# Patient Record
Sex: FEMALE | Race: WHITE | Employment: OTHER | URBAN - NONMETROPOLITAN AREA
[De-identification: names, ages, dates, MRNs, and addresses within clinical notes are randomized per-mention and may not be internally consistent; named-entity substitution may affect disease eponyms.]

---

## 2017-12-29 ENCOUNTER — HOSPITAL ENCOUNTER (OUTPATIENT)
Dept: WOMENS IMAGING | Age: 62
Discharge: HOME | End: 2017-12-29
Payer: COMMERCIAL

## 2017-12-29 DIAGNOSIS — Z12.31 VISIT FOR SCREENING MAMMOGRAM: ICD-10-CM

## 2017-12-29 PROCEDURE — 77063 BREAST TOMOSYNTHESIS BI: CPT

## 2018-12-31 ENCOUNTER — HOSPITAL ENCOUNTER (OUTPATIENT)
Dept: WOMENS IMAGING | Age: 63
Discharge: HOME | End: 2018-12-31
Payer: COMMERCIAL

## 2018-12-31 DIAGNOSIS — Z78.0 POSTMENOPAUSAL: ICD-10-CM

## 2018-12-31 DIAGNOSIS — Z12.31 VISIT FOR SCREENING MAMMOGRAM: ICD-10-CM

## 2018-12-31 PROCEDURE — 77063 BREAST TOMOSYNTHESIS BI: CPT

## 2018-12-31 PROCEDURE — 77080 DXA BONE DENSITY AXIAL: CPT

## 2019-10-10 ENCOUNTER — NURSE ONLY (OUTPATIENT)
Dept: LAB | Age: 64
End: 2019-10-10

## 2019-12-03 ENCOUNTER — NURSE ONLY (OUTPATIENT)
Dept: LAB | Age: 64
End: 2019-12-03

## 2019-12-06 LAB — CYTOLOGY THIN PREP PAP: NORMAL

## 2020-01-02 ENCOUNTER — HOSPITAL ENCOUNTER (OUTPATIENT)
Dept: WOMENS IMAGING | Age: 65
Discharge: HOME | End: 2020-01-02
Payer: COMMERCIAL

## 2020-01-02 PROCEDURE — 77063 BREAST TOMOSYNTHESIS BI: CPT

## 2021-01-04 ENCOUNTER — HOSPITAL ENCOUNTER (OUTPATIENT)
Dept: MAMMOGRAPHY | Age: 66
Discharge: HOME | End: 2021-01-04
Payer: COMMERCIAL

## 2021-01-04 DIAGNOSIS — Z12.31 VISIT FOR SCREENING MAMMOGRAM: ICD-10-CM

## 2021-01-04 PROCEDURE — 77063 BREAST TOMOSYNTHESIS BI: CPT

## 2021-01-07 ENCOUNTER — HOSPITAL ENCOUNTER (OUTPATIENT)
Dept: WOMENS IMAGING | Age: 66
Discharge: HOME | End: 2021-01-07
Payer: COMMERCIAL

## 2021-01-07 DIAGNOSIS — R92.1 BREAST CALCIFICATION, RIGHT: ICD-10-CM

## 2021-01-07 PROCEDURE — G0279 TOMOSYNTHESIS, MAMMO: HCPCS

## 2021-01-22 ENCOUNTER — HOSPITAL ENCOUNTER (OUTPATIENT)
Dept: WOMENS IMAGING | Age: 66
Discharge: HOME | End: 2021-01-22
Payer: COMMERCIAL

## 2021-01-22 DIAGNOSIS — R92.1 BREAST CALCIFICATIONS: ICD-10-CM

## 2021-01-22 PROCEDURE — 88360 TUMOR IMMUNOHISTOCHEM/MANUAL: CPT

## 2021-01-22 PROCEDURE — 2709999900 MAM STEREO BREAST BX W LOC DEVICE 1ST LESION RIGHT

## 2021-01-22 PROCEDURE — 77065 DX MAMMO INCL CAD UNI: CPT

## 2021-01-22 PROCEDURE — 88305 TISSUE EXAM BY PATHOLOGIST: CPT

## 2021-01-22 NOTE — PROGRESS NOTES
Breast Biopsy Flowsheet/Post-Operative Care    Date of Procedure: 1/22/2021  Physician: Dr. Uriel Martinez  Technologist: Patricia Diaz RT(R)(M)    Biopsy:stereotactic breast biopsy  Lesion type: Non-palpable  Breast: right    Clock face position: Site #1: UOQ mid          Primary Method of Detection: Mammogram      Microcalcification's: yes,Increasing Number   Distribution: Grouped    Asymmetry: asymmetric    Biopsy Method:   Mammotome:    Site # 1    Gauge: 10    # of Passes: 8     Clip: Guille       Pre-Op Assessment: (BI-RADS)   4. Suspicious Abnormality    Patient Tolerated Procedure: good  Complications: none  Comments: none    Post Operative Care  Steri strips: Yes  Dressing: Gauze, Tape   Ice Applied to Site:  Yes  Evidence of Bleeding:  No    Pain Verbalized: No      Written Discharge Instructions: Yes  Condition at Discharge: good  Time of Discharge: 1400    Electronically signed by Jose M Wolfe on 1/22/2021 at 1:52 PM

## 2021-01-22 NOTE — PROGRESS NOTES
[facility] [address]  Pre-Biopsy Assessment      Patient Education    Written information about procedure Yes  right   Procedural steps explained Yes Stereotactic Biopsy   Post-op potential: bruising, hematoma, pain Yes    Self-care: activity, care of dressing Yes    Patient verbalized understanding Yes    Consent signed and witnessed Yes      Hormone Therapy Status: takes Estroven (OTC) 6 years    Recent Medication: Aspirin Last Dose: Monday Jan 18th                                     Hormone Replacement Therapy: yes - takes Estroven (OTC) 6 years    Previous Breast Biopsy: no    Previous Diagnosis Cancer: no    Hysterectomy:no    Emotional Status: Calm    Language or Physical Barriers: none    Comments: none      Electronically signed by Marciana Dakin on 1/22/2021 at 1:14 PM

## 2021-01-25 ENCOUNTER — CLINICAL DOCUMENTATION (OUTPATIENT)
Dept: WOMENS IMAGING | Age: 66
End: 2021-01-25

## 2021-01-25 NOTE — PROGRESS NOTES
Contact [facility]: [facility]  Diagnosis: Breast-malignant  Home Disposition: Lives with   Contact Information: Arrived with daughter, Cierra Dunlap for biopsy results. Dr. Lou Miller discussed pathology and recommended breast clinic. All cards given for surgeons and oncologists. Wants to see Dr(s): Undecided. Requests Referral to Doctor(s) with appointment(s): N/A  Additional Referral(s): Sree Nguyen/Sophy Page: [facility]   Integrated breast cancer clinic appointment: 1-29-21 6676  Biopsy site status: Good, some soreness in nipple area. Teaching Sheets provided: Cancer Type: DCIS, What is Breast Cancer, Tumor Markers, Needle Localization, Lumpectomy/Mastectomy Comparison, Radiation Therapy, Breast Cancer Navigation Packet, Nurse Navigation contact information, Breast Clinic appointment and map. Currently on HRT: OTC Estroven   If yes, was patient instructed to stop immediately: YES    Notes: Explained terms doctor and navigators will discuss at next appointments. Questions addressed and encouraged patient to ask [facility]. Will receive call within 24 hrs. Referral faxed to Navigation. Answered yes on Genetic Risk Assessment: No   Results Faxed to:  FELIPA [address] [facility] and ALEXANDRA Loomis.

## 2021-01-26 ENCOUNTER — TELEPHONE (OUTPATIENT)
Dept: CASE MANAGEMENT | Age: 66
End: 2021-01-26

## 2021-01-26 NOTE — TELEPHONE ENCOUNTER
Name: Dione Geiger  : 1955  MRN: [id number]    Oncology Navigation Follow-Up Note    Contact Type:  Telephone    Notes: Called patient to schedule teaching for newly diagnosed breast cancer.   Appointment scheduled for 21 at 1000    Electronically signed by Chris Pineda RN on 2021 at 9:48 AM

## 2021-01-27 ENCOUNTER — CLINICAL DOCUMENTATION (OUTPATIENT)
Dept: CASE MANAGEMENT | Age: 66
End: 2021-01-27

## 2021-01-27 NOTE — PROGRESS NOTES
Name: Katherine Farrar  : 1955  MRN: [id number]      Oncology Navigation- Initial Note: Teaching    Intake-  Contact Type: Cancer Center-teaching with navigator    Diagnosis: Breast-malignant    Home Disposition: Lives with other who is able to assist,  Gene. Patient needs and barriers to care: Knowledge deficit, Emotional Issues/ Fear/ Anxiety, Financial Concerns/ Disability and Symptom Management     Referral Source: Outside Provider    Receptive to Advanced Care Planning/ Palliative Care:  NA    Interventions-   General Interventions: Arrived with  for initial breast cancer teaching. Patient diagnosed  21 with DCIS right breast.      Genetics/Family Hx: MGM with colon cancer diagnosed at age 66. Education/Screenings:  yes -  Breast cancer information packet and navigation welcome packet reviewed. Printed material provided and reviewed. Discussed DCIS, lumpectomy vs mastectomy, ER and IA status, needle localization, and post-op exercises to start when OK by surgeon. Post op pillow provided. Currently on HRT: [address] instructed pt to stop. Referrals: none at this time     Continuum of Care: Diagnosis/Active Treatment    Notes: Teaching completed, questions addressed, emotional support provided. Contact information provided and patient encouraged to call with any questions or concerns. Will follow through continuum of care.       Electronically signed by Sherire Gonzalez RN on 2021 at 10:53 AM

## 2021-01-29 ENCOUNTER — CLINICAL DOCUMENTATION (OUTPATIENT)
Dept: CASE MANAGEMENT | Age: 66
End: 2021-01-29

## 2021-01-29 NOTE — PROGRESS NOTES
Name: Linda Doll  : 1955  MRN: [id number]    Oncology Navigation Follow-Up Note     Contact Type: Integrated Breast Clinic     Subjective: Arrived with family (spouse, Gene, and adult daughters Kervin [place] and Aarti Pisano) to meet with Breast Team. Team discussed treatment for new breast cancer diagnosis. Brought journal with prepared questions.     Objective: Right breast DCIS, clinical stage 0     Assistance Needed: Denies needs at this time.      Referrals: Will call back today, needs to think about which surgeon and oncologist more. Education: Breast Clinic recommendations reviewed. Copy given to patient, see Media for Breast Clinic Recommendations. Prehab explained and agrees to consult.     Notes: Emotional support offered. Does not meet criteria for genetic evaluation. Encouraged to notify oncologist in future if family history changes. Will follow through continuum of care.     Electronically signed by Nathaly Diamond RN on 2021 at 8:48 AM

## 2021-01-29 NOTE — PROGRESS NOTES
Name: Linda Doll  : 1955  MRN: [id number]    Oncology Navigation Follow-Up Note    Contact Type:  Telephone    Notes: Patient called, wants to see Dr. Bill Garza, appt for 2021 at 21  am and Dr. Javier Crawford. Verbalizes understanding about surgeon appt information and location; and that [facility] will coordinate oncology appt for 2 weeks post-op and will call her with appt information. Cindy Aviles getting order for Prehab, PT dept will call patient to schedule.     Electronically signed by Nathaly Diamond RN on 2021 at 10:12 AM

## 2021-02-01 ENCOUNTER — HOSPITAL ENCOUNTER (OUTPATIENT)
Age: 66
Discharge: HOME | End: 2021-02-01
Payer: COMMERCIAL

## 2021-02-01 ENCOUNTER — OFFICE VISIT (OUTPATIENT)
Dept: SURGERY | Age: 66
End: 2021-02-01
Payer: COMMERCIAL

## 2021-02-01 VITALS
WEIGHT: 163 LBS | SYSTOLIC BLOOD PRESSURE: 128 MMHG | HEART RATE: 72 BPM | TEMPERATURE: 97.5 F | RESPIRATION RATE: 12 BRPM | HEIGHT: 60 IN | BODY MASS INDEX: 32 KG/M2 | DIASTOLIC BLOOD PRESSURE: 78 MMHG | OXYGEN SATURATION: 98 %

## 2021-02-01 DIAGNOSIS — D05.11 DUCTAL CARCINOMA IN SITU (DCIS) OF RIGHT BREAST: Primary | ICD-10-CM

## 2021-02-01 DIAGNOSIS — E11.9 TYPE 2 DIABETES MELLITUS WITHOUT COMPLICATION, WITHOUT LONG-TERM CURRENT USE OF INSULIN (HCC): ICD-10-CM

## 2021-02-01 DIAGNOSIS — D05.11 DUCTAL CARCINOMA IN SITU (DCIS) OF RIGHT BREAST: ICD-10-CM

## 2021-02-01 DIAGNOSIS — Z01.818 PRE-OP TESTING: ICD-10-CM

## 2021-02-01 LAB
ANION GAP SERPL CALCULATED.3IONS-SCNC: 15 MEQ/L (ref 8–16)
BUN BLDV-MCNC: 17 MG/DL (ref 7–22)
CALCIUM SERPL-MCNC: 10.9 MG/DL (ref 8.5–10.5)
CHLORIDE BLD-SCNC: 100 MEQ/L (ref 98–111)
CO2: 28 MEQ/L (ref 23–33)
CREAT SERPL-MCNC: 0.9 MG/DL (ref 0.4–1.2)
EKG ATRIAL RATE: 71 BPM
EKG P AXIS: 55 DEGREES
EKG P-R INTERVAL: 140 MS
EKG Q-T INTERVAL: 376 MS
EKG QRS DURATION: 78 MS
EKG QTC CALCULATION (BAZETT): 408 MS
EKG R AXIS: 77 DEGREES
EKG T AXIS: 37 DEGREES
EKG VENTRICULAR RATE: 71 BPM
GFR SERPL CREATININE-BSD FRML MDRD: 63 ML/MIN/1.73M2
GLUCOSE BLD-MCNC: 92 MG/DL (ref 70–108)
HCT VFR BLD CALC: 45.7 % (ref 37–47)
HEMOGLOBIN: 14.5 GM/DL (ref 12–16)
POTASSIUM SERPL-SCNC: 4 MEQ/L (ref 3.5–5.2)
SODIUM BLD-SCNC: 143 MEQ/L (ref 135–145)

## 2021-02-01 PROCEDURE — 99204 OFFICE O/P NEW MOD 45 MIN: CPT | Performed by: SURGERY

## 2021-02-01 PROCEDURE — 36415 COLL VENOUS BLD VENIPUNCTURE: CPT

## 2021-02-01 PROCEDURE — 93005 ELECTROCARDIOGRAM TRACING: CPT

## 2021-02-01 PROCEDURE — 93010 ELECTROCARDIOGRAM REPORT: CPT | Performed by: NUCLEAR MEDICINE

## 2021-02-01 PROCEDURE — 85018 HEMOGLOBIN: CPT

## 2021-02-01 PROCEDURE — 80048 BASIC METABOLIC PNL TOTAL CA: CPT

## 2021-02-01 PROCEDURE — 85014 HEMATOCRIT: CPT

## 2021-02-01 SDOH — ECONOMIC STABILITY: INCOME INSECURITY: HOW HARD IS IT FOR YOU TO PAY FOR THE VERY BASICS LIKE FOOD, HOUSING, MEDICAL CARE, AND HEATING?: NOT ASKED

## 2021-02-01 SDOH — ECONOMIC STABILITY: FOOD INSECURITY: WITHIN THE PAST 12 MONTHS, YOU WORRIED THAT YOUR FOOD WOULD RUN OUT BEFORE YOU GOT MONEY TO BUY MORE.: NOT ASKED

## 2021-02-01 SDOH — ECONOMIC STABILITY: FOOD INSECURITY: WITHIN THE PAST 12 MONTHS, THE FOOD YOU BOUGHT JUST DIDN'T LAST AND YOU DIDN'T HAVE MONEY TO GET MORE.: NOT ASKED

## 2021-02-01 SDOH — ECONOMIC STABILITY: TRANSPORTATION INSECURITY
IN THE PAST 12 MONTHS, HAS LACK OF TRANSPORTATION KEPT YOU FROM MEETINGS, WORK, OR FROM GETTING THINGS NEEDED FOR DAILY LIVING?: NOT ASKED

## 2021-02-01 SDOH — ECONOMIC STABILITY: TRANSPORTATION INSECURITY
IN THE PAST 12 MONTHS, HAS THE LACK OF TRANSPORTATION KEPT YOU FROM MEDICAL APPOINTMENTS OR FROM GETTING MEDICATIONS?: NOT ASKED

## 2021-02-01 NOTE — PROGRESS NOTES
Ella Boateng MD   General Surgery  New Patient Evaluation in Office  Pt Name: Daniella Kramer  Date of Birth 1955   Today's Date: 2/1/2021  Medical Record Number: [id number]  Referring Provider: ASYA Sepulveda  Primary Care Provider: Jason SWEET  Chief Complaint:  Chief Complaint   Patient presents with    Surgical Consult     new patient--ref by [address] [place] [address] for right breast DCIS     Clinical stage 0  ASSESSMENT      1. Ductal carcinoma in situ (DCIS) of right breast    2. Type 2 diabetes mellitus without complication, without long-term current use of insulin ([address]    3. Pre-op testing         PLANS      1. Lengthy discussion undertaken with the patient and her  regarding natural history course and treatment of ductal carcinoma in situ of the breast.  Options for breast conservation with probable need for radiation therapy versus mastectomy with and without reconstruction were discussed. Taking antiestrogen medication for 5 years was discussed as well. Patient wishes to proceed with breast conservation therapy. 2.  Schedule patient for right partial mastectomy with preoperative needle localization  3. Risks of procedure were discussed with the patient and include but not limited to bleeding, infection, hematoma, seroma, positive margins with need for reexcision as well as poor cosmetic outcome. Patient expressed understanding. All questions answered. Patient wishes to proceed. 4.  Preoperative testing per anesthesia guidelines  5. Preoperative oncologic rehabilitation consultation. 6.  Patient will undergo post operative consultations with medical and radiation oncology        Quinn Cormier is a 63y.o. year old female who is presenting today in the office for evaluation of newly diagnosed ductal carcinoma in situ of the right breast.   Rojelio Vasquez gets regular mammography. She has had no breast symptoms and gets regular yearly mammography.   She underwent screening mammography and had a new cluster of calcifications in the right breast upper outer aspect middle depth. She had scattered benign calcifications in both breasts. Diagnostic mammography was performed and revealed clustered punctate calcifications in the right breast upper outer aspect middle depth. There were no other suspicious abnormalities seen. She underwent a stereotactic core biopsy. Pathology revealed ductal carcinoma in situ, intermediate nuclear grade, solid type with central necrosis and associated microcalcifications. Estrogen receptors were 100% positive progesterone receptors were 5% positive. Menarche 17. Menopause 55. . Had children in 20's. Did not breastfeed. Ronnald Epley recently, took for 3 to 4 years. No family history of breast or ovarian malignancy. OCP 1 year  Past Medical History  Past Medical History:   Diagnosis Date    Diabetes mellitus ([address]     type 2    Seasonal allergies     Thyroid disease        Past Surgical History  Past Surgical History:   Procedure Laterality Date    BLADDER SUSPENSION       SECTION      COLONOSCOPY      Dr. Marylou Flores ISAIAS STEROTACTIC LOC BREAST BIOPSY RIGHT Right 2021    ISAIAS STEROTACTIC LOC BREAST BIOPSY RIGHT 2021 [facility]       Medications  Current Outpatient Medications   Medication Sig Dispense Refill    Triamterene-HCTZ (DYAZIDE PO) Take by mouth      Misc Natural Products (CALCIUM PLUS ADVANCED PO) Take by mouth      Multiple Vitamins-Minerals (VITAMIN D3 COMPLETE PO) Take by mouth      METFORMIN HCL PO Take 500 mg by mouth 4 times daily      pravastatin (PRAVACHOL) 20 MG tablet Take 20 mg by mouth daily.  Levothyroxine Sodium 75 MCG CAPS Take  by mouth Daily.  aspirin 81 MG tablet Take 81 mg by mouth daily.  lisinopril (PRINIVIL;ZESTRIL) 10 MG tablet Take 10 mg by mouth daily. No current facility-administered medications for this visit.       Allergies   No Known Allergies    Family History  Family History   Problem Relation Age of Onset    High Blood Pressure Mother     Heart Disease Mother     Atrial Fibrillation Mother     Cancer Father         leukemia    Colon Cancer Maternal Grandmother 64       SocialHistory  Social History     Socioeconomic History    Marital status:      Spouse name: Not on file    Number of children: 3    Years of education: Not on file    Highest education level: Not on file   Occupational History    Not on file   Social Needs    Financial resource strain: Not on file    Food insecurity     Worry: Not on file     Inability: Not on file   [employer] needs     Medical: Not on file     Non-medical: Not on file   Tobacco Use    Smoking status: Never Smoker    Smokeless tobacco: Never Used   Substance and Sexual Activity    Alcohol use: No    Drug use: No    Sexual activity: Yes     Partners: Male     Birth control/protection: Post-menopausal   Lifestyle    Physical activity     Days per week: Not on file     Minutes per session: Not on file    Stress: Not on file   Relationships    Social connections     Talks on phone: Not on file     Gets together: Not on file     Attends Latter day service: Not on file     Active member of club or organization: Not on file     Attends meetings of clubs or organizations: Not on file     Relationship status: Not on file    Intimate partner violence     Fear of current or ex partner: Not on file     Emotionally abused: Not on file     Physically abused: Not on file     Forced sexual activity: Not on file   Other Topics Concern    Not on file   Social History Narrative    Not on file           Review of Systems  Review of Systems   Constitutional: Negative for chills, fatigue, fever and unexpected weight change. HENT: Negative for sore throat, trouble swallowing and voice change. Eyes: Negative for visual disturbance. Respiratory: Positive for apnea.  Negative for cough, shortness of breath and wheezing. Cardiovascular: Negative for chest pain and palpitations. Gastrointestinal: Negative for abdominal pain, blood in stool, nausea and vomiting. Endocrine: Negative for cold intolerance, heat intolerance and polydipsia. Genitourinary: Negative for dysuria, flank pain and hematuria. Musculoskeletal: Negative for gait problem, joint swelling and myalgias. Skin: Negative for color change and rash. Allergic/Immunologic: Negative for immunocompromised state. Neurological: Negative for dizziness, tremors, seizures and speech difficulty. Hematological: Does not bruise/bleed easily. Psychiatric/Behavioral: Negative for behavioral problems, confusion and suicidal ideas. OBJECTIVE     /78 (Site: Left Upper Arm, Position: Sitting, Cuff Size: Medium Adult)   Pulse 72   Temp 97.5 °F (36.4 °C) (Tympanic)   Resp 12   Ht 5' (1.524 m)   Wt 163 lb (73.9 kg)   SpO2 98%   BMI 31.83 kg/m²      Physical Exam  Constitutional:       Appearance: She is well-developed. She is not ill-appearing or diaphoretic. HENT:      Head: Normocephalic and atraumatic. Nose: No congestion. Eyes:      General: No scleral icterus. Extraocular Movements: Extraocular movements intact. Pupils: Pupils are equal, round, and reactive to light. Neck:      Musculoskeletal: Neck supple. No muscular tenderness. Vascular: No JVD. Trachea: No tracheal deviation. Cardiovascular:      Rate and Rhythm: Normal rate and regular rhythm. Heart sounds: Normal heart sounds. No murmur. Pulmonary:      Effort: Pulmonary effort is normal. No respiratory distress. Breath sounds: No wheezing. Chest:      Chest wall: No tenderness. Breasts:         Right: No inverted nipple, mass, nipple discharge, skin change or tenderness. Left: No inverted nipple, mass, nipple discharge, skin change or tenderness. Abdominal:      General: There is no distension. Current study was also evaluated with a Computer Aided Detection    (CAD) system.     There is a benign density both breasts. Oksana Criselda also are benign    scattered calcifications both breasts.     There are new clustered calcifications in the right breast upper    outer aspect middle depth.     No other significant masses, calcifications, or other findings    are seen in either breast.                 Bernabe Palomo M.D.             pgk/:1/4/2021 12:26:03     copy to: [facility], ph:    [phone], fax: [phone]       Imaging Technologist: Yusra CASAS(MAURA)(MARIA), [facility]   letter sent: Additional Tests Needed     [id number]                      IMPRESSION: Mammogram BI-RADS: Category 4A: Suspicious    Abnormality: Low suspicion for malignancy       1. The clustered punctate calcifications in the right breast    appear to have a low suspicion for malignancy.  A stereotactic    biopsy is recommended.     2. The results were reviewed with the patient and the biopsy was    scheduled.           #[id number] - ISAIAS ABILIO DIGITAL DIAGNOSTIC UNILATERAL RIGHT   UNILATERAL RIGHT DIGITAL DIAGNOSTIC MAMMOGRAM 3D/2D WITH CAD:    1/7/2021   CLINICAL: Tomosynthesis.  Abnormal mammogram, pt. returns for    additional images of right breast calcifications.         Comparison is made to exams dated:  1/4/2021 mammogram - [facility], 1/2/2020 mammogram, and 12/31/2018    mammogram - [address]     The tissue of the right breast is heterogeneously dense.  This may    lower the sensitivity of mammography.     Current study was also evaluated with a Computer Aided Detection    (CAD) system.     There is a benign density right breast. Oksana Criselda also are benign    scattered calcifications right breast.     There are clustered punctate calcifications in the right breast    upper outer aspect middle depth.  These are seen in additional views.     No other significant masses or calcifications are seen in the    breast.                 Melissa Mckeon M.D.             pgk/:2021 14:59:24     copy to: [facility], ph:    [phone], fax: [phone]       Imaging Technologist: Prema Borden RT(R)(M), [facility]   letter sent: Additional Tests Needed                  Performed by: [address] [place], [place] [address]. Pathology     Nelsy Rahman                 -[id number]   Assoc.                                              Page 1 of 1   [address]   [place] [facility] [place] [facility], [address] [place] [address]                                                  PROC: 2021   [facility]                                    RECV: 2021   [facility]                                    RPTD: 2021   [place] [facility] [facility], [address]                       MRN:  [id number]    LOC:                        ACCT: [de-identified]  SEX: F                       : 1955  AGE: 63 Y                          PATHOLOGY REPORT                       ATTN: NICOLA MONET                       REQ: Arnetha Dose       Copies To:   Janelle Brooks; ASYA SWEET       Clinical Information: RT CALCIFICATIONS     Addendum report 21     FINAL DIAGNOSIS:   A, B.  Right breast, UOQ calcs \"A\" and \"B\", tribell clip, biopsies:       Ductal carcinoma in situ, intermediate nuclear grade, solid type       with central necrosis and associated microcalcifications.      BREAST BIOMARKERS 21*   Estrogen Receptor: (Clone SP1), [employer]       Positive 100% of cells, strong intensity     Progesterone Receptor: (Clone 1E2), [employer]       Positive 5% of cells, intermediate intensity     External Controls:  Adequate   Internal Controls:  Adequate   Standard Assay Conditions:  Met     Staining Method Used:    Formalin fixation    Antigen retrieval type:  Cell Conditioning 1, mild keyla    Time in antigen retrieval:  30 minutes    Detection system type:   DAB Ultraview kit       Specimen:   A) CORE NEEDLE BREAST BIOPSY, RT \"A\", UOQ, CALCS, TRIBELL CLIP   B) CORE NEEDLE BREAST BIOPSY, RT \"B\", UOQ, CALCS, TRIBELL CLIP       Gross Examination:   A - The container is labeled Joya Fagan, right breast biopsy,   calcs, A, upper outer quadrant, tribell clip.  Received in formalin are   a few small bits of yellow adipose tissue aggregating to 0.8 x 0.4 x   0.1 cm.  1 ns. Fixative:  10% neutral buffered formalin   Tissue removal time:  13:27   Tissue fixation time:  13:32   Total fixation time:  6 hours 28 minutes     B - The container is labeled Joya Fagan, right breast biopsy,   calcs, B, upper outer quadrant, tribell clip.  Received in formalin are   are multiple fragments of yellow adipose tissue aggregating to 1.8 x   1.5 x 0.2 cm.  1 ns.  JORI/BRITTANYR:[id number]     Fixative:  10% neutral buffered formalin   Tissue removal time:  13:27   Tissue fixation time:  13:32   Total fixation time:  6 hours 28 minutes     Microscopic Examination:   A, B.  Procedure: Needle biopsy   Specimen laterality: Right   Tumor site: Upper outer quadrant   Histologic type: Ductal carcinoma in situ   Nuclear grade: Grade II (intermediate)   Necrosis: Present, central (expansive \"comedo\" necrosis)   Microcalcifications: Present in DCIS   Biomarker studies: Pending     88585C7   46822V0                                                     <Sign Out Dr. Luanna Frank Annamarie Dakins, M.D., F.C. A. P       NVML/ [address] [address]  Printed on:  1/25/2021   [address]   [facility], [facility]   Original print date: 01/25/2021         Imaging reviewed and reviewed with patient and .

## 2021-02-01 NOTE — LETTER
[address] [facility]  [place] [address] [address] [facility] [address]  Phone: [phone]  Fax: [phone]    Gabriela Arizmendi MD        February 3, 2021    Javier Brunson  [address] [address]    Patient: Katherine Farrar   MR Number: [id number]   YOB: 1955   Date of Visit: 2/1/2021     Dear Javier Brunson,    I recently saw your patient, Katherine Farrar for the evaluation of ductal carcinoma in situ of the right breast. Below are the relevant portions of my assessment and plan of care. 1. Ductal carcinoma in situ (DCIS) of right breast    2. Type 2 diabetes mellitus without complication, without long-term current use of insulin ([facility]    3. Pre-op testing        1. Lengthy discussion undertaken with the patient and her  regarding natural history course and treatment of ductal carcinoma in situ of the breast.  Options for breast conservation with probable need for radiation therapy versus mastectomy with and without reconstruction were discussed. Taking antiestrogen medication for 5 years was discussed as well. Patient wishes to proceed with breast conservation therapy. 2.  Schedule patient for right partial mastectomy with preoperative needle localization  3. Risks of procedure were discussed with the patient and include but not limited to bleeding, infection, hematoma, seroma, positive margins with need for reexcision as well as poor cosmetic outcome. Patient expressed understanding. All questions answered. Patient wishes to proceed. 4.  Preoperative testing per anesthesia guidelines  5. Preoperative oncologic rehabilitation consultation. 6.  Patient will undergo post operative consultations with medical and radiation oncology        If you have questions, please do not hesitate to call me. I look forward to following Li Cameron along with you.     Sincerely,          Gabriela Arizmendi MD

## 2021-02-03 ENCOUNTER — HOSPITAL ENCOUNTER (OUTPATIENT)
Age: 66
Discharge: HOME | End: 2021-02-03
Payer: COMMERCIAL

## 2021-02-03 PROCEDURE — U0003 INFECTIOUS AGENT DETECTION BY NUCLEIC ACID (DNA OR RNA); SEVERE ACUTE RESPIRATORY SYNDROME CORONAVIRUS 2 (SARS-COV-2) (CORONAVIRUS DISEASE [COVID-19]), AMPLIFIED PROBE TECHNIQUE, MAKING USE OF HIGH THROUGHPUT TECHNOLOGIES AS DESCRIBED BY CMS-2020-01-R: HCPCS

## 2021-02-03 ASSESSMENT — ENCOUNTER SYMPTOMS
ABDOMINAL PAIN: 0
VOMITING: 0
VOICE CHANGE: 0
COLOR CHANGE: 0
BLOOD IN STOOL: 0
COUGH: 0
SHORTNESS OF BREATH: 0
SORE THROAT: 0
APNEA: 1
NAUSEA: 0
WHEEZING: 0
TROUBLE SWALLOWING: 0

## 2021-02-04 LAB — SARS-COV-2: NOT DETECTED

## 2021-02-10 ENCOUNTER — HOSPITAL ENCOUNTER (OUTPATIENT)
Dept: WOMENS IMAGING | Age: 66
Discharge: HOME | End: 2021-02-10
Attending: SURGERY
Payer: COMMERCIAL

## 2021-02-10 ENCOUNTER — HOSPITAL ENCOUNTER (OUTPATIENT)
Age: 66
Setting detail: OUTPATIENT SURGERY
Discharge: HOME | End: 2021-02-10
Attending: SURGERY | Admitting: SURGERY
Payer: COMMERCIAL

## 2021-02-10 ENCOUNTER — HOSPITAL ENCOUNTER (OUTPATIENT)
Dept: WOMENS IMAGING | Age: 66
Discharge: HOME | End: 2021-02-10
Payer: COMMERCIAL

## 2021-02-10 ENCOUNTER — ANESTHESIA EVENT (OUTPATIENT)
Dept: OPERATING ROOM | Age: 66
End: 2021-02-10
Payer: COMMERCIAL

## 2021-02-10 ENCOUNTER — ANESTHESIA (OUTPATIENT)
Dept: OPERATING ROOM | Age: 66
End: 2021-02-10
Payer: COMMERCIAL

## 2021-02-10 VITALS
SYSTOLIC BLOOD PRESSURE: 108 MMHG | HEART RATE: 74 BPM | DIASTOLIC BLOOD PRESSURE: 55 MMHG | HEIGHT: 60 IN | BODY MASS INDEX: 30.63 KG/M2 | TEMPERATURE: 96.8 F | RESPIRATION RATE: 12 BRPM | OXYGEN SATURATION: 97 % | WEIGHT: 156 LBS

## 2021-02-10 VITALS
SYSTOLIC BLOOD PRESSURE: 99 MMHG | RESPIRATION RATE: 23 BRPM | OXYGEN SATURATION: 98 % | DIASTOLIC BLOOD PRESSURE: 53 MMHG

## 2021-02-10 DIAGNOSIS — D05.11 DUCTAL CARCINOMA IN SITU (DCIS) OF RIGHT BREAST: Primary | ICD-10-CM

## 2021-02-10 DIAGNOSIS — D05.11 DUCTAL CARCINOMA IN SITU (DCIS) OF RIGHT BREAST: ICD-10-CM

## 2021-02-10 LAB — GLUCOSE BLD-MCNC: 117 MG/DL (ref 70–108)

## 2021-02-10 PROCEDURE — 3700000000 HC ANESTHESIA ATTENDED CARE: Performed by: SURGERY

## 2021-02-10 PROCEDURE — 19301 PARTIAL MASTECTOMY: CPT | Performed by: SURGERY

## 2021-02-10 PROCEDURE — 3600000013 HC SURGERY LEVEL 3 ADDTL 15MIN: Performed by: SURGERY

## 2021-02-10 PROCEDURE — 3700000001 HC ADD 15 MINUTES (ANESTHESIA): Performed by: SURGERY

## 2021-02-10 PROCEDURE — 88307 TISSUE EXAM BY PATHOLOGIST: CPT

## 2021-02-10 PROCEDURE — 3600000003 HC SURGERY LEVEL 3 BASE: Performed by: SURGERY

## 2021-02-10 PROCEDURE — 7100000010 HC PHASE II RECOVERY - FIRST 15 MIN: Performed by: SURGERY

## 2021-02-10 PROCEDURE — 6360000002 HC RX W HCPCS: Performed by: NURSE ANESTHETIST, CERTIFIED REGISTERED

## 2021-02-10 PROCEDURE — 77065 DX MAMMO INCL CAD UNI: CPT

## 2021-02-10 PROCEDURE — 2500000003 HC RX 250 WO HCPCS: Performed by: NURSE ANESTHETIST, CERTIFIED REGISTERED

## 2021-02-10 PROCEDURE — 2709999900 HC NON-CHARGEABLE SUPPLY: Performed by: SURGERY

## 2021-02-10 PROCEDURE — 2580000003 HC RX 258: Performed by: SURGERY

## 2021-02-10 PROCEDURE — 88342 IMHCHEM/IMCYTCHM 1ST ANTB: CPT

## 2021-02-10 PROCEDURE — 7100000011 HC PHASE II RECOVERY - ADDTL 15 MIN: Performed by: SURGERY

## 2021-02-10 PROCEDURE — 76098 X-RAY EXAM SURGICAL SPECIMEN: CPT

## 2021-02-10 PROCEDURE — 6360000002 HC RX W HCPCS: Performed by: SURGERY

## 2021-02-10 PROCEDURE — 19285 PERQ DEV BREAST 1ST US IMAG: CPT

## 2021-02-10 PROCEDURE — 82948 REAGENT STRIP/BLOOD GLUCOSE: CPT

## 2021-02-10 PROCEDURE — 2500000003 HC RX 250 WO HCPCS: Performed by: SURGERY

## 2021-02-10 PROCEDURE — 88341 IMHCHEM/IMCYTCHM EA ADD ANTB: CPT

## 2021-02-10 RX ORDER — HYDROCODONE BITARTRATE AND ACETAMINOPHEN 5; 325 MG/1; MG/1
1 TABLET ORAL EVERY 4 HOURS PRN
Qty: 18 TABLET | Refills: 0 | Status: SHIPPED | OUTPATIENT
Start: 2021-02-10 | End: 2021-02-13

## 2021-02-10 RX ORDER — PROMETHAZINE HYDROCHLORIDE 25 MG/1
12.5 TABLET ORAL EVERY 6 HOURS PRN
Status: DISCONTINUED | OUTPATIENT
Start: 2021-02-10 | End: 2021-02-10 | Stop reason: HOSPADM

## 2021-02-10 RX ORDER — LIDOCAINE HYDROCHLORIDE 20 MG/ML
INJECTION, SOLUTION EPIDURAL; INFILTRATION; INTRACAUDAL; PERINEURAL PRN
Status: DISCONTINUED | OUTPATIENT
Start: 2021-02-10 | End: 2021-02-10 | Stop reason: SDUPTHER

## 2021-02-10 RX ORDER — MORPHINE SULFATE 2 MG/ML
4 INJECTION, SOLUTION INTRAMUSCULAR; INTRAVENOUS
Status: DISCONTINUED | OUTPATIENT
Start: 2021-02-10 | End: 2021-02-10 | Stop reason: HOSPADM

## 2021-02-10 RX ORDER — IBUPROFEN 800 MG/1
400 TABLET ORAL EVERY 6 HOURS PRN
Status: DISCONTINUED | OUTPATIENT
Start: 2021-02-10 | End: 2021-02-10 | Stop reason: HOSPADM

## 2021-02-10 RX ORDER — SODIUM CHLORIDE 9 MG/ML
INJECTION, SOLUTION INTRAVENOUS CONTINUOUS
Status: DISCONTINUED | OUTPATIENT
Start: 2021-02-10 | End: 2021-02-10 | Stop reason: HOSPADM

## 2021-02-10 RX ORDER — MIDAZOLAM HYDROCHLORIDE 1 MG/ML
INJECTION INTRAMUSCULAR; INTRAVENOUS PRN
Status: DISCONTINUED | OUTPATIENT
Start: 2021-02-10 | End: 2021-02-10 | Stop reason: SDUPTHER

## 2021-02-10 RX ORDER — SODIUM CHLORIDE 0.9 % (FLUSH) 0.9 %
10 SYRINGE (ML) INJECTION PRN
Status: DISCONTINUED | OUTPATIENT
Start: 2021-02-10 | End: 2021-02-10 | Stop reason: HOSPADM

## 2021-02-10 RX ORDER — ONDANSETRON 2 MG/ML
4 INJECTION INTRAMUSCULAR; INTRAVENOUS EVERY 6 HOURS PRN
Status: DISCONTINUED | OUTPATIENT
Start: 2021-02-10 | End: 2021-02-10 | Stop reason: HOSPADM

## 2021-02-10 RX ORDER — SODIUM CHLORIDE 0.9 % (FLUSH) 0.9 %
10 SYRINGE (ML) INJECTION EVERY 12 HOURS SCHEDULED
Status: DISCONTINUED | OUTPATIENT
Start: 2021-02-10 | End: 2021-02-10 | Stop reason: HOSPADM

## 2021-02-10 RX ORDER — PROPOFOL 10 MG/ML
INJECTION, EMULSION INTRAVENOUS PRN
Status: DISCONTINUED | OUTPATIENT
Start: 2021-02-10 | End: 2021-02-10 | Stop reason: SDUPTHER

## 2021-02-10 RX ORDER — LIDOCAINE HYDROCHLORIDE AND EPINEPHRINE BITARTRATE 20; .01 MG/ML; MG/ML
INJECTION, SOLUTION SUBCUTANEOUS PRN
Status: DISCONTINUED | OUTPATIENT
Start: 2021-02-10 | End: 2021-02-10 | Stop reason: ALTCHOICE

## 2021-02-10 RX ORDER — MORPHINE SULFATE 2 MG/ML
2 INJECTION, SOLUTION INTRAMUSCULAR; INTRAVENOUS
Status: DISCONTINUED | OUTPATIENT
Start: 2021-02-10 | End: 2021-02-10 | Stop reason: HOSPADM

## 2021-02-10 RX ORDER — HYDROCODONE BITARTRATE AND ACETAMINOPHEN 5; 325 MG/1; MG/1
2 TABLET ORAL EVERY 4 HOURS PRN
Status: DISCONTINUED | OUTPATIENT
Start: 2021-02-10 | End: 2021-02-10 | Stop reason: HOSPADM

## 2021-02-10 RX ORDER — HYDROCODONE BITARTRATE AND ACETAMINOPHEN 5; 325 MG/1; MG/1
1 TABLET ORAL EVERY 4 HOURS PRN
Status: DISCONTINUED | OUTPATIENT
Start: 2021-02-10 | End: 2021-02-10 | Stop reason: HOSPADM

## 2021-02-10 RX ORDER — BUPIVACAINE HYDROCHLORIDE AND EPINEPHRINE 5; 5 MG/ML; UG/ML
INJECTION, SOLUTION EPIDURAL; INTRACAUDAL; PERINEURAL PRN
Status: DISCONTINUED | OUTPATIENT
Start: 2021-02-10 | End: 2021-02-10 | Stop reason: ALTCHOICE

## 2021-02-10 RX ADMIN — SODIUM CHLORIDE: 9 INJECTION, SOLUTION INTRAVENOUS at 13:15

## 2021-02-10 RX ADMIN — MIDAZOLAM HYDROCHLORIDE 2 MG: 1 INJECTION, SOLUTION INTRAMUSCULAR; INTRAVENOUS at 13:15

## 2021-02-10 RX ADMIN — CEFAZOLIN SODIUM 2 G: 10 INJECTION, POWDER, FOR SOLUTION INTRAVENOUS at 13:24

## 2021-02-10 RX ADMIN — PROPOFOL 250 MG: 10 INJECTION, EMULSION INTRAVENOUS at 13:19

## 2021-02-10 RX ADMIN — LIDOCAINE HYDROCHLORIDE 50 MG: 20 INJECTION, SOLUTION EPIDURAL; INFILTRATION; INTRACAUDAL; PERINEURAL at 13:19

## 2021-02-10 NOTE — PROGRESS NOTES
1404: Patient arrived to Phase II recovery via chair. Awake and alert with  in room. Report received from surgical RN. 1405: VSS, patient denies pain. Site to R breast clean, dry and intact with surgical glue present. 1410: Snack and drink provided. Call light placed within reach. 1436: IV removed without complications. Bandaid applied to site. 1443: Discharge instructions reviewed with patient and patient's . No concerns voiced and AVS given to patient's . 1453: Discharged home in stable condition with .

## 2021-02-10 NOTE — ANESTHESIA POSTPROCEDURE EVALUATION
Department of Anesthesiology  Postprocedure Note    Patient: Linda Doll  MRN: [id number]  YOB: 1955  Date of evaluation: 2/10/2021  Time:  2:03 PM     Procedure Summary     Date: 02/10/21 Room / Location: [facility] 04 / [facility]    Anesthesia Start: 4405 Anesthesia Stop: 0684    Procedure: RIGHT BREAST LUMPETOMY, PREOP NEEDLE LOC (Right Breast) Diagnosis: (DCIS RIGHT BREAST)    Surgeons: Chuy Thomas MD Responsible Provider: Vinny Haddad DO    Anesthesia Type: MAC ASA Status: 2          Anesthesia Type: MAC    Ronald Phase I:      Ronald Phase II:      Last vitals: Reviewed and per EMR flowsheets.        Anesthesia Post Evaluation    Patient location during evaluation: PACU  Patient participation: complete - patient participated  Level of consciousness: awake and alert  Airway patency: patent  Nausea & Vomiting: no nausea and no vomiting  Complications: no  Cardiovascular status: hemodynamically stable  Respiratory status: spontaneous ventilation and acceptable  Hydration status: euvolemic

## 2021-02-10 NOTE — PROGRESS NOTES
Contact Type: [facility]    Contact Information: Arrived with  for needle localization. Having breast surgery today with Dr. Inna Sampson. Diagnosis: DCIS    Patient Expresses Need(s) For: N/A      Notes: Question addressed as needed and procedure explained. Dr. Joyce Raymond placed wire. Secured with gauze and tape per protocol. Transported patient with belongings to [facility] via wheelchair at 1055.

## 2021-02-10 NOTE — PROGRESS NOTES
Formulation and discussion of sedation / procedure plans, risks, benefits, side effects and alternatives with patient and/or responsible adult completed.     Electronically signed by Deya Collado MD on 2/10/2021 at 10:27 AM

## 2021-02-10 NOTE — H&P
[facility]  History and Physical Update    Pt Name: Mariana Wlesh  MRN: [id number]  YOB: 1955  Date of evaluation: 2/10/2021    [x] I have examined the patient and reviewed the H&P/Consult and there are no changes to the patient or plans. [] I have examined the patient and reviewed the H&P/Consult and have noted the following changes:        Reny Aldrich MD  Electronically signed 2/10/2021 at 1:11 PM    Reny Aldrich MD   General Surgery  New Patient Evaluation in Office  Pt Name: Mariana Welsh  Date of Birth 1955   Today's Date: 2/1/2021  Medical Record Number: [id number]  Referring Provider: ASYA Sepulveda  Primary Care Provider: Rika SWEET  Chief Complaint:       Chief Complaint   Patient presents with    Surgical Consult       new patient--ref by [facility] for right breast DCIS      Clinical stage 0  ASSESSMENT   1. Ductal carcinoma in situ (DCIS) of right breast    2. Type 2 diabetes mellitus without complication, without long-term current use of insulin ([address]    3. Pre-op testing       PLANS   1. Lengthy discussion undertaken with the patient and her  regarding natural history course and treatment of ductal carcinoma in situ of the breast.  Options for breast conservation with probable need for radiation therapy versus mastectomy with and without reconstruction were discussed. Taking antiestrogen medication for 5 years was discussed as well. Patient wishes to proceed with breast conservation therapy. 2.  Schedule patient for right partial mastectomy with preoperative needle localization  3. Risks of procedure were discussed with the patient and include but not limited to bleeding, infection, hematoma, seroma, positive margins with need for reexcision as well as poor cosmetic outcome. Patient expressed understanding. All questions answered. Patient wishes to proceed. 4.  Preoperative testing per anesthesia guidelines  5.   Preoperative oncologic rehabilitation consultation. 6.  Patient will undergo post operative consultations with medical and radiation oncology         SUBJECTIVE      Jasper Moss is a 63y.o. year old female who is presenting today in the office for evaluation of newly diagnosed ductal carcinoma in situ of the right breast.   Jasper Moss gets regular mammography. She has had no breast symptoms and gets regular yearly mammography. She underwent screening mammography and had a new cluster of calcifications in the right breast upper outer aspect middle depth. She had scattered benign calcifications in both breasts. Diagnostic mammography was performed and revealed clustered punctate calcifications in the right breast upper outer aspect middle depth. There were no other suspicious abnormalities seen. She underwent a stereotactic core biopsy. Pathology revealed ductal carcinoma in situ, intermediate nuclear grade, solid type with central necrosis and associated microcalcifications. Estrogen receptors were 100% positive progesterone receptors were 5% positive.        Menarche 16. Menopause 55. . Had children in 20's. Did not breastfeed. Onel De La Torre recently, took for 3 to 4 years. No family history of breast or ovarian malignancy.   OCP 1 year  Past Medical History  Past Medical History        Past Medical History:   Diagnosis Date    Diabetes mellitus ([address]       type 2    Seasonal allergies      Thyroid disease            Past Surgical History  Past Surgical History         Past Surgical History:   Procedure Laterality Date    BLADDER SUSPENSION        SECTION       COLONOSCOPY        Dr. Hernan Brown [facility] STEROTACTIC LOC BREAST BIOPSY RIGHT Right 2021     [facility] STEROTACTIC LOC BREAST BIOPSY RIGHT 2021 [facility]    TUBAL LIGATION   1988          Medications  Current Facility-Administered Medications          Current Outpatient Medications   Medication Sig Dispense Refill    [employer]) meetings of clubs or organizations: Not on file       Relationship status: Not on file    Intimate partner violence       Fear of current or ex partner: Not on file       Emotionally abused: Not on file       Physically abused: Not on file       Forced sexual activity: Not on file   Other Topics Concern    Not on file   Social History Narrative    Not on file              Review of Systems  Review of Systems   Constitutional: Negative for chills, fatigue, fever and unexpected weight change. HENT: Negative for sore throat, trouble swallowing and voice change. Eyes: Negative for visual disturbance. Respiratory: Positive for apnea. Negative for cough, shortness of breath and wheezing. Cardiovascular: Negative for chest pain and palpitations. Gastrointestinal: Negative for abdominal pain, blood in stool, nausea and vomiting. Endocrine: Negative for cold intolerance, heat intolerance and polydipsia. Genitourinary: Negative for dysuria, flank pain and hematuria. Musculoskeletal: Negative for gait problem, joint swelling and myalgias. Skin: Negative for color change and rash. Allergic/Immunologic: Negative for immunocompromised state. Neurological: Negative for dizziness, tremors, seizures and speech difficulty. Hematological: Does not bruise/bleed easily. Psychiatric/Behavioral: Negative for behavioral problems, confusion and suicidal ideas.         OBJECTIVE      /78 (Site: Left Upper Arm, Position: Sitting, Cuff Size: Medium Adult)   Pulse 72   Temp 97.5 °F (36.4 °C) (Tympanic)   Resp 12   Ht 5' (1.524 m)   Wt 163 lb (73.9 kg)   SpO2 98%   BMI 31.83 kg/m²       Physical Exam  Constitutional:       Appearance: She is well-developed. She is not ill-appearing or diaphoretic. HENT:      Head: Normocephalic and atraumatic. Nose: No congestion. Eyes:      General: No scleral icterus. Extraocular Movements: Extraocular movements intact.       Pupils: Pupils are equal, round, and reactive to light. Neck:      Musculoskeletal: Neck supple. No muscular tenderness. Vascular: No JVD. Trachea: No tracheal deviation. Cardiovascular:      Rate and Rhythm: Normal rate and regular rhythm. Heart sounds: Normal heart sounds. No murmur. Pulmonary:      Effort: Pulmonary effort is normal. No respiratory distress. Breath sounds: No wheezing. Chest:      Chest wall: No tenderness. Breasts:         Right: No inverted nipple, mass, nipple discharge, skin change or tenderness. Left: No inverted nipple, mass, nipple discharge, skin change or tenderness. Abdominal:      General: There is no distension. Palpations: There is no mass. Tenderness: There is no abdominal tenderness. Musculoskeletal:         General: No swelling or deformity. Right lower leg: No edema. Left lower leg: No edema. Lymphadenopathy:      Cervical: No cervical adenopathy. Right cervical: No superficial, deep or posterior cervical adenopathy. Left cervical: No superficial, deep or posterior cervical adenopathy. Upper Body:      Right upper body: No supraclavicular, axillary or pectoral adenopathy. Left upper body: No supraclavicular, axillary or pectoral adenopathy. Skin:     General: Skin is warm and dry. Coloration: Skin is not jaundiced or pale. Findings: No bruising or rash. Neurological:      General: No focal deficit present. Mental Status: She is alert and oriented to person, place, and time. Cranial Nerves: No cranial nerve deficit. Psychiatric:         Mood and Affect: Mood normal.         Behavior: Behavior normal.         Thought Content:  Thought content normal.                  Lab Results   Component Value Date     HGB 14.5 02/01/2021     HCT 45.7 02/01/2021      02/01/2021     K 4.0 02/01/2021      02/01/2021     CREATININE 0.9 02/01/2021     BUN 17 02/01/2021     CO2 28 02/01/2021                  IMPRESSION: Mammogram BI-RADS: Category 0: Incomplete: Needs    Additional Imaging Evaluation       1. The new clustered calcifications in the right breast appear    indeterminate.  Spot magnification and lateromedial views are    recommended.     2. The patient has been or will be contacted.             #[id number] - [facility] ABILIO DIGITAL SCREEN BILATERAL   BILATERAL DIGITAL SCREENING MAMMOGRAM 3D/2D WITH CAD: 1/4/2021   CLINICAL: Screening. Tomosynthesis.         Comparison is made to exams dated:  1/2/2020 mammogram and    12/31/2018 mammogram - [address] [facility].     The tissue of both breasts is heterogeneously dense. This may    lower the sensitivity of mammography.     Current study was also evaluated with a Computer Aided Detection    (CAD) system.     There is a benign density both breasts. Jeannie Going also are benign    scattered calcifications both breasts.     There are new clustered calcifications in the right breast upper    outer aspect middle depth.     No other significant masses, calcifications, or other findings    are seen in either breast.                 Rachell Mosqueda M.D.             pgk/:1/4/2021 12:26:03     copy to: Chula Sauer [facility], ph:    [phone], fax: [phone]       Imaging Technologist: Allan CASAS(R)(M), [facility]    [facility]   letter sent: Additional Tests Needed     [id number]                         IMPRESSION: Mammogram BI-RADS: Category 4A: Suspicious    Abnormality: Low suspicion for malignancy       1. The clustered punctate calcifications in the right breast    appear to have a low suspicion for malignancy.  A stereotactic    biopsy is recommended.     2.  The results were reviewed with the patient and the biopsy was    scheduled.           #[id number] - [facility] ABILIO DIGITAL DIAGNOSTIC UNILATERAL RIGHT   UNILATERAL RIGHT DIGITAL DIAGNOSTIC MAMMOGRAM 3D/2D WITH CAD:    1/7/2021   CLINICAL: Tomosynthesis. Pearle Plate mammogram, pt. returns for    additional images of right breast calcifications.         Comparison is made to exams dated:  2021 mammogram - [facility] [address] [facility], 2020 mammogram, and 2018    mammogram - [address] [address].     The tissue of the right breast is heterogeneously dense.  This may    lower the sensitivity of mammography.     Current study was also evaluated with a Computer Aided Detection    (CAD) system.     There is a benign density right breast.  There also are benign    scattered calcifications right breast.     There are clustered punctate calcifications in the right breast    upper outer aspect middle depth.  These are seen in additional    views.     No other significant masses or calcifications are seen in the    breast.                 Martin Feliz M.D.             pgk/:2021 14:59:24     copy to: ASYA Sweet [facility], ph:    [phone], fax: [phone]       Imaging Technologist: Amira Charles RT(R)(M), [address] [place] [address]   letter sent: Additional Tests Needed                   Performed by: [facility] Charly Larson. Pathology     [employer]                 [id number]   Assoc.                                              Page 1 of 2 [address] AM, [address]                                                  PROC: 2021   [facility]                                    RECV: 2021   [facility]                                    RPTD: 2021   KWAME MENDEZ II.BIANKA, [facility]                       MRN:  [id number]    LOC: WW                       ACCT: [id number]  SEX: F                       : 1955  AGE: 65 Y                          PATHOLOGY REPORT                       ATTN: NICOLA MONET                       REQ: Pam Banks       Copies To:   Tenisha Tejeda; ASYA SWEET       Clinical Information: RT CALCIFICATIONS     Addendum report 21     FINAL DIAGNOSIS: A, B.  Right breast, UOQ calcs \"A\" and \"B\", tribell clip, biopsies:       Ductal carcinoma in situ, intermediate nuclear grade, solid type       with central necrosis and associated microcalcifications. BREAST BIOMARKERS 1/25/21*   Estrogen Receptor: (Clone SP1), [employer]       Positive 100% of cells, strong intensity     Progesterone Receptor: (Clone 1E2), [employer]       Positive 5% of cells, intermediate intensity     External Controls:  Adequate   Internal Controls:  Adequate   Standard Assay Conditions:  Met     Staining Method Used:    Formalin fixation    Antigen retrieval type:  Cell Conditioning 1, mild keyla    Time in antigen retrieval:  30 minutes    Detection system type:   DAB Ultraview kit       Specimen:   A) CORE NEEDLE BREAST BIOPSY, RT \"A\", UOQ, CALCS, TRIBELL CLIP   B) CORE NEEDLE BREAST BIOPSY, RT \"B\", UOQ, CALCS, TRIBELL CLIP       Gross Examination:   A - The container is labeled Shauna Meyers, right breast biopsy,   calcs, A, upper outer quadrant, tribell clip.  Received in formalin are   a few small bits of yellow adipose tissue aggregating to 0.8 x 0.4 x   0.1 cm.  1 ns.      Fixative:  10% neutral buffered formalin   Tissue removal time:  13:27   Tissue fixation time:  13:32   Total fixation time:  6 hours 28 minutes     B - The container is labeled Shauna Meyers, right breast biopsy,   calcs, B, upper outer quadrant, tribell clip.  Received in formalin are   are multiple fragments of yellow adipose tissue aggregating to 1.8 x   1.5 x 0.2 cm.  1 ns.  JORI/RASHAD:[id number]     Fixative:  10% neutral buffered formalin   Tissue removal time:  13:27   Tissue fixation time:  13:32   Total fixation time:  6 hours 28 minutes     Microscopic Examination:   A, B.  Procedure: Needle biopsy   Specimen laterality: Right   Tumor site: Upper outer quadrant   Histologic type: Ductal carcinoma in situ   Nuclear grade: Grade II (intermediate)   Necrosis: Present, central (expansive \"comedo\" necrosis)   Microcalcifications: Present in DCIS   Biomarker studies: Pending     [id number]   [id number]                                                     <Sign Out Dr. Filiberto Low M.D., F.C. A. P       [facility]/ [address] [address]  Printed on:  1/25/2021   [address]   [address] [facility], [facility] [place]   Original print date: 01/25/2021

## 2021-02-10 NOTE — PROGRESS NOTES
Formulation and discussion of sedation / procedure plans, risks, benefits, side effects and alternatives with patient and/or responsible adult completed.     Electronically signed by Katerina Mccoy MD on 2/10/2021 at 10:27 AM

## 2021-02-10 NOTE — ANESTHESIA PRE PROCEDURE
[facility], [address]    Drug/Infectious Status (If Applicable):  No results found for: HIV, HEPCAB    COVID-19 Screening (If Applicable):   Lab Results   Component Value Date    COVID19 Not Detected 02/03/2021         Anesthesia Evaluation  Patient summary reviewed  Airway: Mallampati: II  TM distance: >3 FB   Neck ROM: full  Mouth opening: > = 3 FB Dental:          Pulmonary:                              Cardiovascular:                      Neuro/Psych:               GI/Hepatic/Renal:             Endo/Other:    (+) Diabetes, . Abdominal:           Vascular:                                        Anesthesia Plan      MAC     ASA 2       Induction: intravenous. Anesthetic plan and risks discussed with patient and spouse. Plan discussed with LINDSEY. Kelin Grant.  [address] [facility],    2/10/2021

## 2021-02-11 ENCOUNTER — TELEPHONE (OUTPATIENT)
Dept: SURGERY | Age: 66
End: 2021-02-11

## 2021-02-11 NOTE — OP NOTE
[address] [place] [address]                                OPERATIVE REPORT    PATIENT NAME: Nirmala Tian                   :        1955  MED REC NO:   [id number]                           ROOM:  ACCOUNT NO:   [de-identified]                           ADMIT DATE: 02/10/2021  PROVIDER:     Cuong Marina M.D.    DATE OF PROCEDURE:  02/10/2021    PREOPERATIVE DIAGNOSES:  Ductal carcinoma in situ, right breast, upper  outer quadrant, posterior depth, estrogen-receptor positive,  progesterone-receptor positive. POSTOPERATIVE DIAGNOSES:  Ductal carcinoma in situ, right breast, upper  outer quadrant, posterior depth, estrogen-receptor positive,  progesterone-receptor positive. PROCEDURE:  Right partial mastectomy with preoperative needle  localization. SURGEON:  Cuong Marina MD.    ANESTHESIA:  IV local with sedation. ESTIMATED BLOOD LOSS:  Less than 10 mL. INDICATION:  See history and physical examination. DESCRIPTION OF PROCEDURE:  The patient had needle localization procedure  performed in the [facility]. She was brought to the  surgery center where she was brought to the operating suite and placed  supine on the operating table. She was given Ancef intravenously. After monitoring devices were placed and appropriate sedation was given,  the right breast and wire were prepped and draped in usual sterile  fashion. Skin incision was made after infiltrating with local  anesthetic. Dissection was carried down the wire to the deeper space,  lumpectomy surrounding the wire was performed. Specimen was oriented  with MarginMap and specimen radiograph obtained contained area of  interest clip and wire. There were some calcifications, which maybe fat  necrosis from the prior biopsy at the caudal aspect of the skin margin. This was re-excised and sent as a separate specimen.   Wound was irrigated. Hemostasis was achieved with electrocautery. Lumpectomy  cavity was marked with some clips. Dermis was closed with interrupted  Vicryl suture and skin was closed with running subcuticular 4-0 Monocryl  suture. Skin glue was applied. The patient tolerated the procedure  well, was transported to phase II of the recovery area in the surgery  center in stable condition. The patient will follow up in one week in  the office for pathology results.         Silvino Real M.D.    D: 02/10/2021 14:11:02       T: 02/10/2021 14:17:48     [id number]  Job#: [id number]     Doc#: [id number]    CC:

## 2021-02-11 NOTE — TELEPHONE ENCOUNTER
Called to check on patient post op, pt states she is doing well, not having much discomfort and not needing to take any pain medicine. No nausea or trouble urinating. No questions or concerns voiced, pt was advised to call the office if any issues arise.

## 2021-02-17 ENCOUNTER — TELEPHONE (OUTPATIENT)
Dept: SPIRITUAL SERVICES | Facility: CLINIC | Age: 66
End: 2021-02-17

## 2021-02-22 ENCOUNTER — OFFICE VISIT (OUTPATIENT)
Dept: SURGERY | Age: 66
End: 2021-02-22

## 2021-02-22 VITALS
HEIGHT: 60 IN | RESPIRATION RATE: 20 BRPM | BODY MASS INDEX: 31.04 KG/M2 | WEIGHT: 158.1 LBS | SYSTOLIC BLOOD PRESSURE: 130 MMHG | HEART RATE: 89 BPM | TEMPERATURE: 97.8 F | OXYGEN SATURATION: 98 % | DIASTOLIC BLOOD PRESSURE: 70 MMHG

## 2021-02-22 DIAGNOSIS — Z01.818 PRE-OP TESTING: ICD-10-CM

## 2021-02-22 DIAGNOSIS — D05.11 DUCTAL CARCINOMA IN SITU (DCIS) OF RIGHT BREAST: Primary | ICD-10-CM

## 2021-02-22 PROCEDURE — 99024 POSTOP FOLLOW-UP VISIT: CPT | Performed by: SURGERY

## 2021-02-22 NOTE — PROGRESS NOTES
Oscar Junior MD  General Surgery  Postprocedure Evaluation in Office  Pt Name: Rigo Mensah  Date of Birth 1955   Today's Date: 2/22/2021  Medical Record Number: [id number]  Primary Care Provider: Clover SWEET  Chief Complaint   Patient presents with    Post-Op Check     s/p Right partial mastectomy with preoperative needle localization 2/10/21     ASSESSMENT      1. Ductal carcinoma in situ (DCIS) of right breast    2. Pre-op testing         PLANS       1. Pathology reviewed with the patient who understands. All questions were answered. High-grade DCIS nuclear grade solid and cribriform type with associated comedonecrosis and microcalcifications. 12 mm maximal dimension. Very close multiple margins less than a millimeter deep caudal and cranial.  2. Follow up: No follow-ups on file. 3.  Schedule patient for reexcision of multiple margins recommend reexcision of very close margins. Reduce recurrence risk. 4.  MAC anesthesia  5. Will need repeat COVID-19 screen    Stage 0 ER positive [place] positive  SUBJECTIVE     Rockie Bamberger is seen today for post-op follow-up. She is status post right partial mastectomy for DCIS of the right breast.  She has no wound issues. Wound well-healed. Discussed pathology with patient and her . Multiple positive very close margins. High-grade recommend reexcision of margins reduce recurrence risk. Patient agreeable. All questions answered. Medications    Current Outpatient Medications:     Triamterene-HCTZ (DYAZIDE PO), Take by mouth, Disp: , Rfl:     Misc Natural Products (CALCIUM PLUS ADVANCED PO), Take by mouth, Disp: , Rfl:     Multiple Vitamins-Minerals (VITAMIN D3 COMPLETE PO), Take by mouth, Disp: , Rfl:     METFORMIN HCL PO, Take 500 mg by mouth 4 times daily, Disp: , Rfl:     pravastatin (PRAVACHOL) 20 MG tablet, Take 20 mg by mouth daily. , Disp: , Rfl:     Levothyroxine Sodium 75 MCG CAPS, Take  by mouth Daily. , Disp: , Rfl:     aspirin 81 MG tablet, Take 81 mg by mouth daily. , Disp: , Rfl:   Allergies    No Known Allergies    Review of Systems  History obtained from the patient. Constitutional: Denies any fever, chills, fatigue. Wound: Denies any rash, skin color changes or wound problems. Resp: Denies any cough, shortness of breath. CV: Denies any chest pain, orthopnea or syncope. GI: Denies any nausea, vomiting, blood in the stool, constipation or diarrhea. OBJECTIVE     VITALS: /70 (Site: Left Upper Arm, Position: Sitting, Cuff Size: Medium Adult)   Pulse 89   Temp 97.8 °F (36.6 °C) (Temporal)   Resp 20   Ht 5' (1.524 m)   Wt 158 lb 1.6 oz (71.7 kg)   SpO2 98%   BMI 30.88 kg/m²     CONSTITUTIONAL: Alert and oriented times 3, no acute distress and cooperative to examination. SKIN: Skin color, texture, turgor normal. No rashes or lesions. INCISION: wound margins intact and healing well. No signs of infection. No drainage. NECK: Soft, trachea midline and straight  BREAST: Lumpectomy right, No evidence of seroma or hematoma. LUNGS: clear  CARDIOVASCULAR: normal heart rate  NEUROLOGIC: No sensory or motor nerve irritation  EXTREMITIES: no cyanosis, no clubbing and no edema.      Performed by: [facility] Charly Larson. Pathology     [place], [place]                 [id number]   Assoc.                                              Page 1 of 4 [phone]    [address] [place] [id number]                                                       PROC: 02/10/2021   [facility]                                    RECV: 02/10/2021   [address] [facility]                                    RPTD: 02/15/2021   [address] [place], [place] [address]                       MRN:  [id number]    LOC: ASOR                       ACCT: [id number]  SEX: F                       : 1955  AGE: 63 Y                          PATHOLOGY REPORT                       ATTN: MIKE WAYNE: Rad CRAFT       Copies To:   ASYA SWEET       Clinical Information: DCIS RIGHT BREAST     FINAL DIAGNOSIS:   A. Breast, right, lumpectomy:       Ductal carcinoma in situ, nuclear grade 3, solid and cribriform       types, with comedonecrosis and microcalcifications, pTis (DCIS) NX.    See microscopic for margin status.    Changes consistent with previous biopsy site. B. Breast, right, new skin caudal margin, excision:    Fibrocystic changes including dense fibrosis and microcyst formation.    Fat necrosis.    Negative for malignancy. Specimen:   A) EXCISION OF BREAST, DCIS RIGHT BREAST   B) EXCISION OF BREAST, NEW SKIN CAUDAL MARGIN RIGHT       Gross Examination:   A - The container is labeled Saad Mirza, right breast mass. Received fresh on an x-ray grid is an oriented lumpectomy specimen   including a localization wire.  Accompanying x-ray indicates a clip at   grid coordinate D3.  The specimen measures 6 cm from the medial to   lateral margin, 3.5 cm from the cranial to caudal margin, and 2 cm from   the skin to deep margin.  The skin margin is inked blue, the deep   margin is inked yellow, caudal and lateral margins are inked black, and   the cranial and medial margins are inked green.  Sections through the   specimen reveal a yellow fatty cut surface with focal hemorrhage. There is a biopsy site identified. Jarold Marts is surrounding necrosis.  No   discrete masses are identified.  The biopsy site measures about 1.1 x   0.5 x about 2.2 cm.  The biopsy site is focally 0.5 cm from both the   skin and deep margins.  Representative sections are submitted.    Cassette #1 - lateral margin; cassettes #2 and #3 - cranial, caudal,   skin, and deep margins; cassette #4 - caudal, skin, and deep margins;   cassette #5 - cranial, skin, and deep margins, cassette #6 - caudal,   skin, and deep margins, cassette #7 - cranial, skin, and deep margins;   cassette #8 - caudal, skin, and deep margins; cassettes #9 and #10 -   cranial, skin, and deep margins, and cassettes #11 and #12 - medial margin.  ss. Fixative:  10% neutral buffered formalin   Tissue removal time:  1330   Tissue fixation time:  1400   Total fixation time:  30 hours     B - The container is labeled Thien Javier, new skin, caudal margin,   right breast.  Received in formalin is a fragment of yellow adipose   tissue measuring 4.5 x 2 x about 1.3 cm.  There is a suture designating   the new skin, caudal margin.  The margin is inked blue.  Sections   through the specimen reveal no discrete lesions.  Representative   sections are submitted in three cassettes.  ss.  SIO/DKR:[id number]     Fixative:  10% neutral buffered formalin   Tissue removal time:  1347   Tissue fixation time:  1350   Total fixation time: 30 hours and 10 minutes     Microscopic Examination:   A.  Procedure   Excision (less than total mastectomy)     Specimen Laterality   Right     + Tumor Site   + Upper outer quadrant     Size (Extent) of DCIS   Estimated size (extent) of DCIS is at least (millimeters) 12 mm       + Additional dimensions (millimeters): 9 mm   + Number of blocks with DCIS: 11   + Number of blocks examined: 12     Histologic Type   Ductal carcinoma in situ   Note: Immunohistochemistry for p63 is performed on blocks A5, A6, and   A7 with adequate controls.  The p63 highlights intact myoepithelial   cells.  E-cadherin is performed on block A5 with adequate controls.  E   cadherin is intact consistent with ductal carcinoma in situ.     + Architectural Patterns (select all that apply)   + Comedo   + Cribriform   + Solid     Nuclear Grade   Grade III (high)     Necrosis   Present, central (expansive \"comedo\" necrosis)     Margins   Uninvolved by DCIS    Distance from closest margin (millimeters):       Less than 1 mm       Specify closest margin (required only if <10mm): Deep, caudal,   cranial       + Distance from other margins (specify millimeter distance       including greater than if appropriate):        Medial: 2 mm   Note: The skin margin inked blue is positive on specimen A.  However,   the new skin caudal margin is negative in specimen B. Regional Lymph Nodes   No lymph nodes submitted or found     Pathologic Stage Classification (pTNM, AJCC 8th Edition)   Primary Tumor  (pT)   pTis (DCIS): Ductal carcinoma in situ     Regional Lymph Nodes (pN)   Category (pN)   pNX: Regional lymph nodes cannot be assessed (eg, not removed for             pathological study or previously removed)     + Additional Pathologic Findings   + Specify: Fibrocystic changes including apocrine metaplasia, microcyst   formation, and dense fibrosis. Changes consistent with previous biopsy site.     + Ancillary Studies   + Breast Biomarker Testing Performed on Previous Biopsy: Case number   [id number]     + Estrogen Receptor (ER)   + Positive 100%     + Progesterone Receptor (PgR)   + Positive 5%     + Microcalcifications (select all that apply)   +  Present in DCIS   +  Present in nonneoplastic tissue     B.  Sections demonstrate breast tissue with fibrocystic changes   including dense fibrosis and microcyst formation.  There is focal fat   necrosis.  In situ and invasive carcinoma are not identified. *This test was developed and its performance characteristics determined   by [address] [place] [address] has not been cleared or   approved by the U.S. Food and Drug Administration. Pursuant to the   requirements of CLIA, this laboratory has established and verified the   test's accuracy and precision.  Additional information about this type   of test is available upon request.     [id number]   [id number]   [id number] x2                                                     <Sign Out Dr. Canelo Corbett M.D., F.C.A.P.        [facility]/ [address] [address]  Printed on:  2/15/2021   [address]   SANTAMI MENDEZ II.VIERTEL, [facility]   Original print date: 02/15/2021

## 2021-02-25 ENCOUNTER — HOSPITAL ENCOUNTER (OUTPATIENT)
Age: 66
Discharge: HOME | End: 2021-02-25
Payer: COMMERCIAL

## 2021-02-25 ENCOUNTER — HOSPITAL ENCOUNTER (OUTPATIENT)
Dept: PHYSICAL THERAPY | Age: 66
Setting detail: THERAPIES SERIES
Discharge: HOME | End: 2021-02-25
Payer: COMMERCIAL

## 2021-02-25 PROCEDURE — U0003 INFECTIOUS AGENT DETECTION BY NUCLEIC ACID (DNA OR RNA); SEVERE ACUTE RESPIRATORY SYNDROME CORONAVIRUS 2 (SARS-COV-2) (CORONAVIRUS DISEASE [COVID-19]), AMPLIFIED PROBE TECHNIQUE, MAKING USE OF HIGH THROUGHPUT TECHNOLOGIES AS DESCRIBED BY CMS-2020-01-R: HCPCS

## 2021-02-25 PROCEDURE — 97110 THERAPEUTIC EXERCISES: CPT

## 2021-02-25 PROCEDURE — 97161 PT EVAL LOW COMPLEX 20 MIN: CPT

## 2021-02-25 NOTE — PROGRESS NOTES
* PLEASE SIGN, DATE AND TIME CERTIFICATION BELOW AND RETURN TO [facility] OUTPATIENT REHABILITATION (FAX #: [phone]). ATTEST/CO-SIGN IF ACCESSING VIA [facility]. THANK YOU.**    I certify that I have examined the patient below and determined that Physical Medicine and Rehabilitation service is necessary and that I approve the established plan of care for up to 90 days or as specifically noted. Attestation, signature or co-signature of physician indicates approval of certification requirements.    ________________________ ____________ __________  Physician Signature   Date   Time  [facility]  PHYSICAL THERAPY  [x] EVALUATION    [x]  [facility]     Date: 2021  Patient Name:  Angelica Mauricio  : 1955  MRN: [id number]      Referring Practitioner Melanie Liu MD with Dr. Melida Knapp to follow   Diagnosis Intraductal carcinoma in situ of right breast [D05.11]    Treatment Diagnosis Postural abnormality, R shoulder stiffness, Z71.9   Date of Evaluation 21    Additional Pertinent History DM II      Functional Outcome Measure Used O: QuickDASH   Functional Outcome Score 2.3% impaired (21)       Insurance: Primary: Payor: R /  /  / ,   Secondary: Bebeto Welsh   Authorization Information: No maintenance, aquatics and modalities covered   Visit # 1, 1/10 for progress note   Visits Allowed: 60 visits per calendar year   Recertification Date:    Physician Follow-Up: 3/3/20 Re-excision R breast   Physician Orders: Prehab   History of Present Illness: 21 R DCIS ER/DC+, 2/10/21 R lumpectomy DCIS     SUBJECTIVE: States doing well from lumpectomy without complaints.  present for session. Social/Functional History and Current Status:  Medications and Allergies have been reviewed and are listed on Medical History Questionnaire.     Angelica Mauricio lives with spouse in a multiple floor home with stairs and no handrail to enter.    Task Previous Current   ADLs  Independent Independent   IADL's Independent Independent   Ambulation Independent Independent   Transfers Independent Independent   Recreation Independent Enjoys shopping, crafting, puzzles - independent   Community Integration Independent Independent   Driving Active  Active    Work Retired  Retired     OBJECTIVE:   Posture: Poor, Protruded Head, Protracted Scapula  Palpation: No tenderness reported, mild firm tissue at lumpectomy site without evidence of edema  Observation: Poor posture, no other obvious deformity    Range of Motion/Strength (Range of Motion in degrees)    Right Left Comments   Shoulder Flexion PROM 178 180    Shoulder ABDuction PROM 180 180    Shoulder Strength 4+/5 4+/5    Elbow Strength 5/5 5/5      Circumferential Measurements:   Upper Extremity Circumferential Measurements    Right (cm) Left (cm) Comments   Met Heads 18 17.6    Ulnar Styloid Process 16.9 16    10 cm distal to Lateral Epicondyle 24.2 24    Elbow Crease 25.2 26    10 cm proximal from Lateral Epicondyle 33 32.3    Axilla 35.3 35.5    Total 152.6 151.4 Axilla - to - Axilla: NT     Brief Fatigue Inventory: 0 (0: none, 1-3: mild, 4-6: moderate, 7-10: severe)  Quick Dash: 2.3% impaired    Treatment Initiated:    TREATMENT   Precautions:    Pain: 0/10    X in shaded column indicates activity completed today   Modalities Parameters/  Location  Notes         Manual Therapy Time/Technique  Notes         Exercise/Intervention   Notes   Pec stretches in doorway x2 variations 3  x Handout provided     Specific Interventions Next Treatment: PORi protocol for gentle manual lymphatic drainage and myofascial release, gentle stretches, strengthening, conditioning, balance, posture, lymphedema education as needed    Activity Tolerance: Patient tolerated treatment well    ASSESSMENT:  Assessment: Pt presents after R lumpectomy with poor posture and R shoulder stiffness.  She will be having a sessions to coincide with the start of radiation and after radiation has ended to update HEP accordingly.     Time In 0940   Time Out 1015   Timed Code Minutes: 15 min   Total Treatment Time: 35 min       Electronically Signed by: Carly Flores PT, DPT, [facility] [id number] 2/25/2021

## 2021-02-26 ENCOUNTER — TELEPHONE (OUTPATIENT)
Dept: SURGERY | Age: 66
End: 2021-02-26

## 2021-02-26 NOTE — TELEPHONE ENCOUNTER
Called Care Coordinators @ [facility]  CPT code 73643 3/3/21 has been approved.   Auth # [id number]

## 2021-02-27 LAB — SARS-COV-2: NOT DETECTED

## 2021-03-02 NOTE — PROGRESS NOTES
Patient contacted regarding COVID-19 screen. Test was done on 2/25/21* at [facility]**  Following questions asked: In the last month, have you been in contact with someone who was confirmed or suspected to have Coronavirus/COVID-19:  Patient stated NO      Pt was informed can be a visitor allowed. Please bring masks. Do you or family members have any of the following symptoms:  Cough-no   Muscle pain-no   Shortness of breath-no   Fever-no   Weakness-no  Severe headache-no   Sore throat-no   Respiratory symptoms-no    Have you traveled internationally in the last month? No     Have you been to the emergency room recently-no     Inform pt will need to have urine test done if she hasn't had a tubal ligation or hysterectomy.

## 2021-03-03 ENCOUNTER — HOSPITAL ENCOUNTER (OUTPATIENT)
Age: 66
Setting detail: OUTPATIENT SURGERY
Discharge: HOME | End: 2021-03-03
Attending: SURGERY | Admitting: SURGERY
Payer: COMMERCIAL

## 2021-03-03 ENCOUNTER — ANESTHESIA EVENT (OUTPATIENT)
Dept: OPERATING ROOM | Age: 66
End: 2021-03-03
Payer: COMMERCIAL

## 2021-03-03 ENCOUNTER — ANESTHESIA (OUTPATIENT)
Dept: OPERATING ROOM | Age: 66
End: 2021-03-03
Payer: COMMERCIAL

## 2021-03-03 VITALS
HEIGHT: 60 IN | TEMPERATURE: 96.9 F | SYSTOLIC BLOOD PRESSURE: 106 MMHG | DIASTOLIC BLOOD PRESSURE: 59 MMHG | OXYGEN SATURATION: 98 % | BODY MASS INDEX: 30.43 KG/M2 | RESPIRATION RATE: 18 BRPM | WEIGHT: 155 LBS | HEART RATE: 66 BPM

## 2021-03-03 VITALS — DIASTOLIC BLOOD PRESSURE: 54 MMHG | OXYGEN SATURATION: 99 % | TEMPERATURE: 96.8 F | SYSTOLIC BLOOD PRESSURE: 104 MMHG

## 2021-03-03 LAB — GLUCOSE BLD-MCNC: 132 MG/DL (ref 70–108)

## 2021-03-03 PROCEDURE — 3700000000 HC ANESTHESIA ATTENDED CARE: Performed by: SURGERY

## 2021-03-03 PROCEDURE — 82948 REAGENT STRIP/BLOOD GLUCOSE: CPT

## 2021-03-03 PROCEDURE — 19301 PARTIAL MASTECTOMY: CPT | Performed by: SURGERY

## 2021-03-03 PROCEDURE — 3600000012 HC SURGERY LEVEL 2 ADDTL 15MIN: Performed by: SURGERY

## 2021-03-03 PROCEDURE — 88342 IMHCHEM/IMCYTCHM 1ST ANTB: CPT

## 2021-03-03 PROCEDURE — 7100000011 HC PHASE II RECOVERY - ADDTL 15 MIN: Performed by: SURGERY

## 2021-03-03 PROCEDURE — 2580000003 HC RX 258: Performed by: SURGERY

## 2021-03-03 PROCEDURE — 6360000002 HC RX W HCPCS: Performed by: SURGERY

## 2021-03-03 PROCEDURE — 2709999900 HC NON-CHARGEABLE SUPPLY: Performed by: SURGERY

## 2021-03-03 PROCEDURE — 3600000002 HC SURGERY LEVEL 2 BASE: Performed by: SURGERY

## 2021-03-03 PROCEDURE — 6360000002 HC RX W HCPCS: Performed by: NURSE ANESTHETIST, CERTIFIED REGISTERED

## 2021-03-03 PROCEDURE — 7100000010 HC PHASE II RECOVERY - FIRST 15 MIN: Performed by: SURGERY

## 2021-03-03 PROCEDURE — 88307 TISSUE EXAM BY PATHOLOGIST: CPT

## 2021-03-03 PROCEDURE — 2500000003 HC RX 250 WO HCPCS: Performed by: SURGERY

## 2021-03-03 PROCEDURE — 3700000001 HC ADD 15 MINUTES (ANESTHESIA): Performed by: SURGERY

## 2021-03-03 RX ORDER — SODIUM CHLORIDE 0.9 % (FLUSH) 0.9 %
10 SYRINGE (ML) INJECTION PRN
Status: DISCONTINUED | OUTPATIENT
Start: 2021-03-03 | End: 2021-03-03 | Stop reason: HOSPADM

## 2021-03-03 RX ORDER — ONDANSETRON 2 MG/ML
INJECTION INTRAMUSCULAR; INTRAVENOUS PRN
Status: DISCONTINUED | OUTPATIENT
Start: 2021-03-03 | End: 2021-03-03 | Stop reason: SDUPTHER

## 2021-03-03 RX ORDER — ACETAMINOPHEN 325 MG/1
650 TABLET ORAL EVERY 4 HOURS PRN
Status: DISCONTINUED | OUTPATIENT
Start: 2021-03-03 | End: 2021-03-03 | Stop reason: HOSPADM

## 2021-03-03 RX ORDER — FENTANYL CITRATE 50 UG/ML
INJECTION, SOLUTION INTRAMUSCULAR; INTRAVENOUS PRN
Status: DISCONTINUED | OUTPATIENT
Start: 2021-03-03 | End: 2021-03-03 | Stop reason: SDUPTHER

## 2021-03-03 RX ORDER — PROPOFOL 10 MG/ML
INJECTION, EMULSION INTRAVENOUS CONTINUOUS PRN
Status: DISCONTINUED | OUTPATIENT
Start: 2021-03-03 | End: 2021-03-03 | Stop reason: SDUPTHER

## 2021-03-03 RX ORDER — MORPHINE SULFATE 2 MG/ML
2 INJECTION, SOLUTION INTRAMUSCULAR; INTRAVENOUS
Status: DISCONTINUED | OUTPATIENT
Start: 2021-03-03 | End: 2021-03-03 | Stop reason: HOSPADM

## 2021-03-03 RX ORDER — SODIUM CHLORIDE 0.9 % (FLUSH) 0.9 %
10 SYRINGE (ML) INJECTION EVERY 12 HOURS SCHEDULED
Status: DISCONTINUED | OUTPATIENT
Start: 2021-03-03 | End: 2021-03-03 | Stop reason: HOSPADM

## 2021-03-03 RX ORDER — ONDANSETRON 2 MG/ML
4 INJECTION INTRAMUSCULAR; INTRAVENOUS EVERY 6 HOURS PRN
Status: DISCONTINUED | OUTPATIENT
Start: 2021-03-03 | End: 2021-03-03 | Stop reason: HOSPADM

## 2021-03-03 RX ORDER — HYDROCODONE BITARTRATE AND ACETAMINOPHEN 5; 325 MG/1; MG/1
2 TABLET ORAL EVERY 4 HOURS PRN
Status: DISCONTINUED | OUTPATIENT
Start: 2021-03-03 | End: 2021-03-03 | Stop reason: HOSPADM

## 2021-03-03 RX ORDER — HYDROCODONE BITARTRATE AND ACETAMINOPHEN 5; 325 MG/1; MG/1
1 TABLET ORAL EVERY 4 HOURS PRN
Status: DISCONTINUED | OUTPATIENT
Start: 2021-03-03 | End: 2021-03-03 | Stop reason: HOSPADM

## 2021-03-03 RX ORDER — PROMETHAZINE HYDROCHLORIDE 25 MG/1
12.5 TABLET ORAL EVERY 6 HOURS PRN
Status: DISCONTINUED | OUTPATIENT
Start: 2021-03-03 | End: 2021-03-03 | Stop reason: HOSPADM

## 2021-03-03 RX ORDER — KETOROLAC TROMETHAMINE 30 MG/ML
INJECTION, SOLUTION INTRAMUSCULAR; INTRAVENOUS PRN
Status: DISCONTINUED | OUTPATIENT
Start: 2021-03-03 | End: 2021-03-03 | Stop reason: SDUPTHER

## 2021-03-03 RX ORDER — SODIUM CHLORIDE 9 MG/ML
INJECTION, SOLUTION INTRAVENOUS CONTINUOUS
Status: DISCONTINUED | OUTPATIENT
Start: 2021-03-03 | End: 2021-03-03 | Stop reason: HOSPADM

## 2021-03-03 RX ORDER — MIDAZOLAM HYDROCHLORIDE 1 MG/ML
INJECTION INTRAMUSCULAR; INTRAVENOUS PRN
Status: DISCONTINUED | OUTPATIENT
Start: 2021-03-03 | End: 2021-03-03 | Stop reason: SDUPTHER

## 2021-03-03 RX ORDER — MORPHINE SULFATE 2 MG/ML
4 INJECTION, SOLUTION INTRAMUSCULAR; INTRAVENOUS
Status: DISCONTINUED | OUTPATIENT
Start: 2021-03-03 | End: 2021-03-03 | Stop reason: HOSPADM

## 2021-03-03 RX ORDER — DEXAMETHASONE SODIUM PHOSPHATE 4 MG/ML
INJECTION, SOLUTION INTRA-ARTICULAR; INTRALESIONAL; INTRAMUSCULAR; INTRAVENOUS; SOFT TISSUE PRN
Status: DISCONTINUED | OUTPATIENT
Start: 2021-03-03 | End: 2021-03-03 | Stop reason: SDUPTHER

## 2021-03-03 RX ADMIN — CEFAZOLIN 2 G: 10 INJECTION, POWDER, FOR SOLUTION INTRAVENOUS at 08:44

## 2021-03-03 RX ADMIN — MIDAZOLAM HYDROCHLORIDE 2 MG: 1 INJECTION, SOLUTION INTRAMUSCULAR; INTRAVENOUS at 08:48

## 2021-03-03 RX ADMIN — ONDANSETRON HYDROCHLORIDE 4 MG: 4 INJECTION, SOLUTION INTRAMUSCULAR; INTRAVENOUS at 08:51

## 2021-03-03 RX ADMIN — KETOROLAC TROMETHAMINE 30 MG: 30 INJECTION, SOLUTION INTRAMUSCULAR at 09:10

## 2021-03-03 RX ADMIN — DEXAMETHASONE SODIUM PHOSPHATE 8 MG: 4 INJECTION, SOLUTION INTRAMUSCULAR; INTRAVENOUS at 08:51

## 2021-03-03 RX ADMIN — SODIUM CHLORIDE: 9 INJECTION, SOLUTION INTRAVENOUS at 08:27

## 2021-03-03 RX ADMIN — FENTANYL CITRATE 100 MCG: 50 INJECTION, SOLUTION INTRAMUSCULAR; INTRAVENOUS at 08:48

## 2021-03-03 RX ADMIN — PROPOFOL 75 MCG/KG/MIN: 10 INJECTION, EMULSION INTRAVENOUS at 08:48

## 2021-03-03 ASSESSMENT — PULMONARY FUNCTION TESTS
PIF_VALUE: 1
PIF_VALUE: 1
PIF_VALUE: 0
PIF_VALUE: 1
PIF_VALUE: 1
PIF_VALUE: 0
PIF_VALUE: 1
PIF_VALUE: 0
PIF_VALUE: 1
PIF_VALUE: 0
PIF_VALUE: 1
PIF_VALUE: 0
PIF_VALUE: 1
PIF_VALUE: 0
PIF_VALUE: 1
PIF_VALUE: 1
PIF_VALUE: 0
PIF_VALUE: 1

## 2021-03-03 ASSESSMENT — PAIN SCALES - GENERAL: PAINLEVEL_OUTOF10: 0

## 2021-03-03 NOTE — PROGRESS NOTES

## 2021-03-03 NOTE — ANESTHESIA POSTPROCEDURE EVALUATION
Department of Anesthesiology  Postprocedure Note    Patient: Jenny Miranda  MRN: [id number]  YOB: 1955  Date of evaluation: 3/3/2021  Time:  12:15 PM     Procedure Summary     Date: 03/03/21 Room / Location: [address] [place] [address]    Anesthesia Start: 6727 Anesthesia Stop: 2704    Procedure: RE-EXCISION MULTIPLE MARGINS, RIGHT BREAST (Right Breast) Diagnosis: (DCIS RIGHT BREAST)    Surgeons: Jessica Neumann MD Responsible Provider: Mer Zamora DO    Anesthesia Type: MAC ASA Status: 2          Anesthesia Type: MAC    Ronald Phase I: Ronald Score: 10    Ronald Phase II: Ronald Score: 10    Last vitals: Reviewed and per EMR flowsheets.        Anesthesia Post Evaluation    Patient location during evaluation: bedside  Patient participation: complete - patient participated  Level of consciousness: awake  Airway patency: patent  Nausea & Vomiting: no nausea and no vomiting  Complications: no  Cardiovascular status: hemodynamically stable  Respiratory status: acceptable  Hydration status: stable

## 2021-03-03 NOTE — H&P
[address] [address]  History and Physical Update    Pt Name: Linda Mcdowell  MRN: [id number]  YOB: 1955  Date of evaluation: 3/3/2021    [] I have examined the patient and reviewed the H&P/Consult and there are no changes to the patient or plans. [x] I have examined the patient and reviewed the H&P/Consult and have noted the following changes: Patient underwent lumpectomy for DCIS high-grade close margins . Less than 1 mm. Recommend reexcision of margins. Patient agreeable. Leah Avilez MD  Electronically signed 3/3/2021 at Sandy Hong MD  General Surgery  Postprocedure Evaluation in Office  Pt Name: Linda Mcdowell  Date of Birth 1955   Today's Date: 2/22/2021  Medical Record Number: [id number]  Primary Care Provider: Ruben SWEET       Chief Complaint   Patient presents with    Post-Op Check       s/p Right partial mastectomy with preoperative needle localization 2/10/21      ASSESSMENT   1. Ductal carcinoma in situ (DCIS) of right breast    2. Pre-op testing       PLANS       1. Pathology reviewed with the patient who understands. All questions were answered. High-grade DCIS nuclear grade solid and cribriform type with associated comedonecrosis and microcalcifications. 12 mm maximal dimension. Very close multiple margins less than a millimeter deep caudal and cranial.  2. Follow up: No follow-ups on file. 3.  Schedule patient for reexcision of multiple margins recommend reexcision of very close margins. Reduce recurrence risk. 4.  MAC anesthesia  5. Will need repeat COVID-19 screen    Stage 0 ER positive IN positive  SUBJECTIVE      Wilhelmenia Signs is seen today for post-op follow-up. She is status post right partial mastectomy for DCIS of the right breast.  She has no wound issues. Wound well-healed. Discussed pathology with patient and her . Multiple positive very close margins. High-grade recommend reexcision of margins reduce recurrence risk. Patient agreeable. All questions answered.     Medications    Current Medication      Current Outpatient Medications:     Triamterene-HCTZ (DYAZIDE PO), Take by mouth, Disp: , Rfl:     Misc Natural Products (CALCIUM PLUS ADVANCED PO), Take by mouth, Disp: , Rfl:     Multiple Vitamins-Minerals (VITAMIN D3 COMPLETE PO), Take by mouth, Disp: , Rfl:     METFORMIN HCL PO, Take 500 mg by mouth 4 times daily, Disp: , Rfl:     pravastatin (PRAVACHOL) 20 MG tablet, Take 20 mg by mouth daily. , Disp: , Rfl:     Levothyroxine Sodium 75 MCG CAPS, Take  by mouth Daily. , Disp: , Rfl:     aspirin 81 MG tablet, Take 81 mg by mouth daily. , Disp: , Rfl:      Allergies     No Known Allergies     Review of Systems  History obtained from the patient.     Constitutional: Denies any fever, chills, fatigue. Wound: Denies any rash, skin color changes or wound problems. Resp: Denies any cough, shortness of breath. CV: Denies any chest pain, orthopnea or syncope. GI: Denies any nausea, vomiting, blood in the stool, constipation or diarrhea.      OBJECTIVE      VITALS: /70 (Site: Left Upper Arm, Position: Sitting, Cuff Size: Medium Adult)   Pulse 89   Temp 97.8 °F (36.6 °C) (Temporal)   Resp 20   Ht 5' (1.524 m)   Wt 158 lb 1.6 oz (71.7 kg)   SpO2 98%   BMI 30.88 kg/m²      CONSTITUTIONAL: Alert and oriented times 3, no acute distress and cooperative to examination. SKIN: Skin color, texture, turgor normal. No rashes or lesions. INCISION: wound margins intact and healing well. No signs of infection. No drainage. NECK: Soft, trachea midline and straight  BREAST: Lumpectomy right, No evidence of seroma or hematoma.   LUNGS: clear  CARDIOVASCULAR: normal heart rate  NEUROLOGIC: No sensory or motor nerve irritation  EXTREMITIES: no cyanosis, no clubbing and no edema.      Performed by: [facility]     [place] [facility]                 [id number]   Assoc.                                              Page 1 of 3 [id number]   [address], [place] [address]                                                       PROC: 02/10/2021   [facility]                                    RECV: 02/10/2021   [address]                                    RPTD: 02/15/2021   [place] [address] [place], [place] [address]                       MRN:  [id number]    LOC: [facility]                       ACCT: [id number]  SEX: F                       : 1955  AGE: 63 Y                          PATHOLOGY REPORT                       ATTN: MIKE CRAFT   [de-identified]                  REQ: [id number] [facility] OLT       Copies To:   ASYA SWEET       Clinical Information: DCIS RIGHT BREAST     FINAL DIAGNOSIS:   A. Breast, right, lumpectomy:       Ductal carcinoma in situ, nuclear grade 3, solid and cribriform       types, with comedonecrosis and microcalcifications, pTis (DCIS) NX.    See microscopic for margin status.    Changes consistent with previous biopsy site. B. Breast, right, new skin caudal margin, excision:    Fibrocystic changes including dense fibrosis and microcyst formation.    Fat necrosis.    Negative for malignancy. Specimen:   A) EXCISION OF BREAST, DCIS RIGHT BREAST   B) EXCISION OF BREAST, NEW SKIN CAUDAL MARGIN RIGHT       Gross Examination:   A - The container is labeled Maria Fernanda Velazquez, right breast mass. Received fresh on an x-ray grid is an oriented lumpectomy specimen   including a localization wire.  Accompanying x-ray indicates a clip at   grid coordinate D3.  The specimen measures 6 cm from the medial to   lateral margin, 3.5 cm from the cranial to caudal margin, and 2 cm from   the skin to deep margin.  The skin margin is inked blue, the deep   margin is inked yellow, caudal and lateral margins are inked black, and   the cranial and medial margins are inked green.  Sections through the   specimen reveal a yellow fatty cut surface with focal hemorrhage.    There is a biopsy site identified. Herchel Steve is surrounding necrosis.  No   discrete masses are identified.  The biopsy site measures about 1.1 x   0.5 x about 2.2 cm.  The biopsy site is focally 0.5 cm from both the   skin and deep margins.  Representative sections are submitted. Cassette #1 - lateral margin; cassettes #2 and #3 - cranial, caudal,   skin, and deep margins; cassette #4 - caudal, skin, and deep margins;   cassette #5 - cranial, skin, and deep margins, cassette #6 - caudal,   skin, and deep margins, cassette #7 - cranial, skin, and deep margins;   cassette #8 - caudal, skin, and deep margins; cassettes #9 and #10 -   cranial, skin, and deep margins, and cassettes #11 and #12 - medial   margin.  ss. Fixative:  10% neutral buffered formalin   Tissue removal time:  1330   Tissue fixation time:  1400   Total fixation time:  30 hours     B - The container is labeled Arreaga Bares, new skin, caudal margin,   right breast.  Received in formalin is a fragment of yellow adipose   tissue measuring 4.5 x 2 x about 1.3 cm.  There is a suture designating   the new skin, caudal margin.  The margin is inked blue.  Sections   through the specimen reveal no discrete lesions.  Representative   sections are submitted in three cassettes.  ss.  SIO/DKR:[id number]     Fixative:  10% neutral buffered formalin   Tissue removal time:  1347   Tissue fixation time:  1350   Total fixation time: 30 hours and 10 minutes     Microscopic Examination:   A.  Procedure   Excision (less than total mastectomy)     Specimen Laterality   Right     + Tumor Site   + Upper outer quadrant     Size (Extent) of DCIS   Estimated size (extent) of DCIS is at least (millimeters) 12 mm       + Additional dimensions (millimeters): 9 mm   + Number of blocks with DCIS: 11   + Number of blocks examined: 12     Histologic Type   Ductal carcinoma in situ   Note: Immunohistochemistry for p63 is performed on blocks A5, A6, and   A7 with adequate controls.  The p63 highlights intact myoepithelial   cells.  E-cadherin is performed on block A5 with and Drug Administration. Pursuant to the   requirements of CLIA, this laboratory has established and verified the   test's accuracy and precision.  Additional information about this type   of test is available upon request.     [id number]   [id number]   27431 x2                                                     <Sign Out Dr. Oswaldo Daniels M.D., F.C.A.P.        [facility]/ [address] [address]  Printed on:  2/15/2021   [facility]   [address] [facility], [address] [place] [address] Brayden Wenaré   Original print date: 02/15/2021

## 2021-03-03 NOTE — OP NOTE
[address] [address]  Operative Report    PATIENT NAME: Jayden Washburn RECORD NO. [id number]  SURGEON: Livier Deleon MD    Primary Care Physician: Fidel SWEET  Date: 3/3/2021, 9:25 AM     PROCEDURE PERFORMED: Right  Lumpectomy reexcision of multiple margins  PREOPERATIVE DIAGNOSIS: High-grade ER positive DCIS right breast multiple positive margins  POSTOPERATIVE DIAGNOSIS: Same, path pending  SURGEON:  Livier Deleon MD    ANESTHESIA:  Monitored Local Anesthesia with Sedation  ESTIMATED BLOOD LOSS:  20  ml  SPECIMEN: Deep, caudal and skin cranial margin  COMPLICATIONS:  None; patient tolerated the procedure well. DRAINS: none  DISPOSITION: Same day surgery  CONDITION: stable      Indications: This patient presents with history of Right breast DCIS with previous lumpectomy and desire for breast conservation therapy. She had multiple very close/essentially positive margins. Reexcision recommended. Patient agreeable. Procedure Details   The patient was seen in the Holding Area after needle localization of the breast mass had been performed as well as nuclear medicine injection of radiotracer. The risks, benefits, complications, treatment options, and expected outcomes had been previously discussed with the patient. The patient was brought to the operating room, placed supine on the operating room table, time, was taken, preoperative antibiotics were given, and signed consent on the chart. After appropriate sedation was given, the right breast was prepped and draped in the usual sterile fashion. Timeout was performed. After infiltrating with local anesthetic the old skin incision was open and the previous lumpectomy cavity entered. Sequentially the deep caudal and skin cranial margins were reexcised. They were oriented with margin map devices. They were sent as 3 separate specimens. Wound was irrigated with sterile water. Hemostasis was achieved with electrocautery.   Margins of the lumpectomy site were marked with small clips. Deep dermis and fat were closed with interrupted 3-0 Vicryl suture. Skin was closed with running subcuticular 4-0 Monocryl suture. Skin glue was applied. Patient tolerated the procedure well and was transported back to same-day surgery in stable condition. She will follow-up in the office next week for wound check. Estimated Blood Loss:  20 ml                 Specimens: To pathology oriented. Complications:  None; patient tolerated the procedure well.            Disposition: Same day surgery           Condition: stable        Gabriela Arizmendi MD   Electronically signed 3/3/2021 at 9:25 AM

## 2021-03-03 NOTE — FLOWSHEET NOTE
Pt returned to [facility] room 8. Vitals and assessment as charted. 0.9 infusing, @200ml to count from PACU. Pt has crackers and rosemarie mist. Family at the bedside. Pt and family verbalized understanding of discharge criteria and call light use. Call light in reach.

## 2021-03-03 NOTE — ANESTHESIA PRE PROCEDURE
Department of Anesthesiology  Preprocedure Note       Name:  Linda Doll   Age:  63 y.o.  :  1955                                          MRN:  [id number]         Date:  3/3/2021      Surgeon: Leora Membreno):  Chuy Thomas MD    Procedure: Procedure(s):  RE-EXCISION MULTIPLE MARGINS, RIGHT BREAST    Medications prior to admission:   Prior to Admission medications    Medication Sig Start Date End Date Taking? Authorizing Provider   Triamterene-HCTZ (DYAZIDE PO) Take by mouth   Yes Historical Provider, MD   pravastatin (PRAVACHOL) 20 MG tablet Take 20 mg by mouth daily. Yes Historical Provider, MD   Levothyroxine Sodium 75 MCG CAPS Take  by mouth Daily. Yes Historical Provider, MD   Misc Natural Products (CALCIUM PLUS ADVANCED PO) Take by mouth    Historical Provider, MD   Multiple Vitamins-Minerals (VITAMIN D3 COMPLETE PO) Take by mouth    Historical Provider, MD   METFORMIN HCL PO Take 500 mg by mouth 4 times daily    Historical Provider, MD   aspirin 81 MG tablet Take 81 mg by mouth daily.     Historical Provider, MD       Current medications:    Current Facility-Administered Medications   Medication Dose Route Frequency Provider Last Rate Last Admin    0.9 % sodium chloride infusion   Intravenous Continuous Chuy Thomas MD        sodium chloride flush 0.9 % injection 10 mL  10 mL Intravenous 2 times per day Chuy Thomas MD        sodium chloride flush 0.9 % injection 10 mL  10 mL Intravenous PRN Chuy Thomas MD        ceFAZolin (ANCEF) 2000 mg in dextrose 5 % 50 mL IVPB  2,000 mg Intravenous On Call to [address] [place] [address], MD           Allergies:  No Known Allergies    Problem List:    Patient Active Problem List   Diagnosis Code    Ductal carcinoma in situ (DCIS) of right breast D05.11       Past Medical History:        Diagnosis Date    Active cochlear Meniere's disease     Diabetes mellitus ([facility] [address]     type 2    Seasonal allergies     Thyroid disease        Past Surgical History:        Procedure Laterality Date    BLADDER SUSPENSION  1990    BREAST LUMPECTOMY Right 2/10/2021    RIGHT BREAST LUMPETOMY, PREOP NEEDLE LOC performed by Irina Tabor MD at [place] [address]  2006    Dr. Shell aSlgado [facility] LOC BREAST BIOPSY RIGHT Right 01/22/2021    [facility] BREAST BIOPSY RIGHT 1/22/2021 [facility] [place]    US GUIDED NEEDLE LOC OF RIGHT BREAST Right 2/10/2021    US GUIDED NEEDLE LOC OF RIGHT BREAST 2/10/2021 Charbel Jordan MD [facility]       Social History:    Social History     Tobacco Use    Smoking status: Never Smoker    Smokeless tobacco: Never Used   Substance Use Topics    Alcohol use: No                                Counseling given: Not Answered      Vital Signs (Current):   Vitals:    03/03/21 0801   BP: (!) 142/63   Pulse: 79   Resp: 16   Temp: 97.6 °F (36.4 °C)   TempSrc: Temporal   SpO2: 100%   Weight: 155 lb (70.3 kg)   Height: 5' (1.524 m)                                              BP Readings from Last 3 Encounters:   03/03/21 (!) 142/63   02/22/21 130/70   02/10/21 (!) 108/55       NPO Status:                                                   Date of last liquid consumption: 03/02/21                             BMI:   Wt Readings from Last 3 Encounters:   03/03/21 155 lb (70.3 kg)   02/22/21 158 lb 1.6 oz (71.7 kg)   02/10/21 156 lb (70.8 kg)     Body mass index is 30.27 kg/m². CBC:   Lab Results   Component Value Date    HGB 14.5 02/01/2021    HCT 45.7 02/01/2021       CMP:   Lab Results   Component Value Date     02/01/2021    K 4.0 02/01/2021     02/01/2021    CO2 28 02/01/2021    BUN 17 02/01/2021    CREATININE 0.9 02/01/2021    LABGLOM 63 02/01/2021    GLUCOSE 92 02/01/2021    CALCIUM 10.9 02/01/2021       POC Tests: No results for input(s): POCGLU, POCNA, POCK, POCCL, POCBUN, POCHEMO, POCHCT in the last 72 hours.     Coags: No results found for: PROTIME, INR, APTT    HCG (If Applicable): No results found for: PREGTESTUR, PREGSERUM, HCG, HCGQUANT     ABGs: No results found for: PHART, PO2ART, BDG0BAP, JAH0XZJ, BEART, M0CMANRT     Type & Screen (If Applicable):  No results found for: LABABO, LABRH    Drug/Infectious Status (If Applicable):  No results found for: HIV, HEPCAB    COVID-19 Screening (If Applicable):   Lab Results   Component Value Date    COVID19 Not Detected 02/25/2021         Anesthesia Evaluation  Patient summary reviewed and Nursing notes reviewed no history of anesthetic complications:   Airway: Mallampati: I        Dental:          Pulmonary: breath sounds clear to auscultation                             Cardiovascular:  Exercise tolerance: good (>4 METS),           Rhythm: regular  Rate: normal                    Neuro/Psych:               GI/Hepatic/Renal:             Endo/Other:    (+) Diabetes, . Abdominal:       Abdomen: soft. Vascular:                                        Anesthesia Plan      MAC     ASA 2       Induction: intravenous. MIPS: Postoperative opioids intended and Prophylactic antiemetics administered. Anesthetic plan and risks discussed with patient and spouse. Plan discussed with CRNA.                   Vasu Johnson DO   3/3/2021

## 2021-03-03 NOTE — PROGRESS NOTES
Patient admitted to [facility] room 8 with  at bedside. Bed in low position side rails up call light in reach. Patient denies questions at this time.

## 2021-03-04 ENCOUNTER — TELEPHONE (OUTPATIENT)
Dept: SURGERY | Age: 66
End: 2021-03-04

## 2021-03-04 NOTE — TELEPHONE ENCOUNTER
S/p 3/3  Right  Lumpectomy reexcision of multiple margins    Pt states that her incisions are clean, dry and intact. Denies any excessive bleeding/drainage. Pt states that she is using the bathroom with no issues, denies N/V or any fevers. Pt advised to call the office with any questions or concerns.

## 2021-03-11 ENCOUNTER — TELEPHONE (OUTPATIENT)
Dept: SURGERY | Age: 66
End: 2021-03-11

## 2021-03-11 NOTE — TELEPHONE ENCOUNTER
----- Message from Jessica Neumann MD sent at 3/11/2021  3:37 PM EST -----  May notify patient new margins negative.  ----- Message -----  From: Saeid Cross Incoming Lab Results From Soft  Sent: 3/5/2021   3:06 PM EST  To: Jessica Neumann MD

## 2021-03-15 ENCOUNTER — OFFICE VISIT (OUTPATIENT)
Dept: SURGERY | Age: 66
End: 2021-03-15

## 2021-03-15 VITALS
SYSTOLIC BLOOD PRESSURE: 137 MMHG | BODY MASS INDEX: 30.43 KG/M2 | TEMPERATURE: 97.2 F | OXYGEN SATURATION: 98 % | WEIGHT: 155 LBS | RESPIRATION RATE: 14 BRPM | DIASTOLIC BLOOD PRESSURE: 77 MMHG | HEIGHT: 60 IN | HEART RATE: 67 BPM

## 2021-03-15 DIAGNOSIS — G89.18 POST-OP PAIN: ICD-10-CM

## 2021-03-15 DIAGNOSIS — D05.11 DUCTAL CARCINOMA IN SITU (DCIS) OF RIGHT BREAST: Primary | ICD-10-CM

## 2021-03-15 PROCEDURE — 99024 POSTOP FOLLOW-UP VISIT: CPT | Performed by: SURGERY

## 2021-03-15 NOTE — PROGRESS NOTES
Joya Betancourt MD  General Surgery  Postprocedure Evaluation in Office  Pt Name: Lisa Felix  Date of Birth 1955   Today's Date: 3/15/2021  Medical Record Number: [id number]  Primary Care Provider: Eren SWEET  Chief Complaint   Patient presents with    Post-Op Check     s/p Right Lumpectomy reexcision of multiple margins 3/3/21     ASSESSMENT      1. Ductal carcinoma in situ (DCIS) of right breast    2. Post-op pain       Stage 0 ER/MI positive  PLANS       1. Pathology reviewed with the patient who understands. All questions were answered. Patient had reexcision of margins there was some residual but new margins all negative. Estimate conglomerate size of total DCIS 13 mm  2. Follow up: Return in about 6 months (around 9/15/2021). 3.  Patient follows up with medical oncology, Dr. Salbador Lux this week. Oncotype for DCIS submitted but declined by insurance  4. Radiation oncology consultation. Leah Priest is seen today for post-op follow-up. She is status post right lumpectomy and reexcision of margins. Initial very close margins reexcision recommended. 1 new margin had residual DCIS all new margins now negative. Conglomerate size of DCIS at least 12 mm. ER positive and weakly MI positive. Lumpectomy site looks good no complications. Medications    Current Outpatient Medications:     Triamterene-HCTZ (DYAZIDE PO), Take by mouth, Disp: , Rfl:     Misc Natural Products (CALCIUM PLUS ADVANCED PO), Take by mouth, Disp: , Rfl:     Multiple Vitamins-Minerals (VITAMIN D3 COMPLETE PO), Take by mouth, Disp: , Rfl:     METFORMIN HCL PO, Take 500 mg by mouth 4 times daily, Disp: , Rfl:     pravastatin (PRAVACHOL) 20 MG tablet, Take 20 mg by mouth daily. , Disp: , Rfl:     Levothyroxine Sodium 75 MCG CAPS, Take  by mouth Daily. , Disp: , Rfl:     aspirin 81 MG tablet, Take 81 mg by mouth daily. , Disp: , Rfl:   Allergies    No Known Allergies    Review of Systems  History obtained from the patient. Constitutional: Denies any fever, chills, fatigue. Wound: Denies any rash, skin color changes or wound problems. Resp: Denies any cough, shortness of breath. CV: Denies any chest pain, orthopnea or syncope. GI: Denies any nausea, vomiting, blood in the stool, constipation or diarrhea. OBJECTIVE     VITALS: /77 (Site: Right Upper Arm, Position: Sitting, Cuff Size: Medium Adult)   Pulse 67   Temp 97.2 °F (36.2 °C) (Tympanic)   Resp 14   Ht 5' (1.524 m)   Wt 155 lb (70.3 kg)   SpO2 98%   BMI 30.27 kg/m²     CONSTITUTIONAL: Alert and oriented times 3, no acute distress and cooperative to examination. SKIN: Skin color, texture, turgor normal. No rashes or lesions. INCISION: wound margins intact and healing well. No signs of infection. No drainage. NECK: Soft, trachea midline and straight  BREAST: Lumpectomy right No evidence of seroma or hematoma. LUNGS: clear  CARDIOVASCULAR: Normal heart rate  NEUROLOGIC: No sensory or motor nerve irritation  EXTREMITIES: no cyanosis, no clubbing and no edema.      [facility]formed by: [facility]     [place], [place]                 [id number]   Assoc.                                              Page 1 of 5 [address], [place] [address]                                                       PROC: 2021   [facility]                                    RECV: 2021   [address] [facility]                                    RPTD: 2021   [address], [place] [address]                       MRN:  [id number]    LOC: OR                       ACCT: [de-identified]  SEX: F                       : 1955  AGE: 63 Y                          PATHOLOGY REPORT                       ATTN: MIKE Lin.Going                  REQ: Davina CRAFT       Copies To:   INDERJIT SWEET       Clinical Information: DCIS RIGHT BREAST     FINAL DIAGNOSIS:   A.  Right breast, new deep margin, resection:    Residual ductal carcinoma in situ, Grade 2-3 nuclei, solid and comedo   pattern with microcalcifications.    Previous biopsy site changes.    DCIS is 1mm to deep margin and lateral margins.    Lateral margin is free of neoplasia. B.  Right breast, new caudal margin, resection:    Negative for malignancy.    Previous biopsy site changes. C.  Right breast, new skin/cranial margin, resection:    Negative for malignancy.    Previous biopsy site changes. Specimen:   A) EXCISION OF BREAST, NEW DEEP MARGIN, RIGHT   B) EXCISION OF BREAST, NEW CAUDAL MARGIN, RIGHT   C) EXCISION OF BREAST, NEW SKIN CRANIAL MARGIN, RIGHT       Gross Examination:   A - The container is labeled Angel Villarreal, new deep margin, right   breast.  Received in formalin is a fragment of yellow adipose tissue   measuring 3.5 x 2.5 x about 1.7 cm.  There is a tag indicating the   lateral margin and the new deep margin.  Sections through the specimen   reveal no discrete lesions.  The new deep margin is inked blue and the   lateral margin is inked yellow.  Representative sections are submitted.    Cassettes #1 through #3 - new deep margin; and cassette #4 - lateral   margin.  ss. Fixative:  10% neutral buffered formalin   Tissue removal time:  9:00   Tissue fixation time:   9:00   Total fixation time:  35 hours     B - The container is labeled Angel Villarreal, new caudal margin, right   breast.  Received in formalin is a segment of yellow adipose tissue   measuring 5 x 3.7 x about 2 cm.  The specimen is oriented with a tag on   the new caudal margin and an additional tag on the medial and deep   margins.  The new caudal margin is inked blue, the medial margin is   inked yellow, and the deep margin is inked black.  Sections through the   specimen reveal no discrete masses.  Representative sections are   submitted.  Cassettes #1 through #5 - sections of new caudal margin   with deep margin; and cassette #6 - medial margin.  ss.      Fixative:  10% neutral buffered formalin   Tissue removal time:  9:00   Tissue fixation time:   9:06   Total fixation time:  34 hours and 54 minutes     C - The container is labeled Juliane Walton, new skin and cranial   margin, right breast.  Received in formalin is a fragment of yellow   adipose tissue measuring 4.5 x about 3.5 x 2 cm.  The specimen is   oriented with three metallic tags, two indicating the skin and cranial   margins.  These are on the same plane of the specimen.  There is a   third indicating the medial margin.  The skin margin is inked black,   the cranial margin is inked blue, and the medial margin is inked   yellow.  No masses are identified.  Representative sections are   submitted.  Cassettes #1 through #3 - skin margin; cassettes #4 and #5   - cranial margin; and cassette #6 - cranial and medial margins.  ss. EKM/DKR:[id number]     Fixative:  10% neutral buffered formalin   Tissue removal time:  9:03   Tissue fixation time:  9:07   Total fixation time:  34 hours and 53 minutes       Microscopic Examination:   A.  The excision demonstrates residual ductal carcinoma in situ, grad   2- 3 nuclei, solid pattern.  The nuclei demonstrate epithelioid   features and pagetoid spread throughout lobules with cancerization of   lobules.  The in situ neoplasia is associated with few   microcalcifications.  The new deep and lateral margins show fpcal in   situ neoplasia 1 mm to the inked margins.  Deeper levels are examined. An E-cadherin immunostain is strongly positive consistent with ductal   differentiation. The control is adequate. [place] sections taken through the new inked margins demonstrate   previous biopsy site changes with no residual neoplasia.  The margins   are free of malignancy. [place] sections are taken through the new inked margins   demonstrate previous biopsy site changes.  No residual neoplasia is   appreciated. This patient has previous lumpectomy, [id number] with Ductal Carcinoma   In Situ, Grade 3. *This test was developed and its performance characteristics determined   by [address] [place] [address] has not been cleared or   approved by the U.S. Food and Drug Administration. Pursuant to the   requirements of CLIA, this laboratory has established and verified the   test's accuracy and precision.  Additional information about this type   of test is available upon request.     [id number]   [id number]                                                       <Sign Out Dr. Marcia Waller M.D., F.C.A.P. [facility]/ [address] [facility]  Printed on:  3/5/2021   [address] [place] [address]            Performed by: [id number]. Pathology     [place], [place]                 [id number]   Assoc.                                              Page 1 of 5 [phone], [place] [address]                                                       PROC: 02/10/2021   [facility]                                    RECV: 02/10/2021   [address]                                    RPTD: 02/15/2021   [address], [place] [address]                       MRN:  [id number]    LOC: [facility]                       ACCT: [id number]  SEX: F                       : 1955  AGE: 63 Y                          PATHOLOGY REPORT                       ATTN: MIKE CRAFT   [de-identified]                  REQ: Leilani CRAFT       Copies To:   ASYA SWEET       Clinical Information: DCIS RIGHT BREAST     FINAL DIAGNOSIS:   A. Breast, right, lumpectomy:       Ductal carcinoma in situ, nuclear grade 3, solid and cribriform       types, with comedonecrosis and microcalcifications, pTis (DCIS) NX.    See microscopic for margin status.    Changes consistent with previous biopsy site. B. Breast, right, new skin caudal margin, excision:    Fibrocystic changes including dense fibrosis and microcyst formation.    Fat necrosis.    Negative for malignancy.      Specimen:   A) EXCISION OF BREAST, DCIS RIGHT BREAST B) EXCISION OF BREAST, NEW SKIN CAUDAL MARGIN RIGHT       Gross Examination:   A - The container is labeled Juliane Walton, right breast mass. Received fresh on an x-ray grid is an oriented lumpectomy specimen   including a localization wire.  Accompanying x-ray indicates a clip at   grid coordinate D3.  The specimen measures 6 cm from the medial to   lateral margin, 3.5 cm from the cranial to caudal margin, and 2 cm from   the skin to deep margin.  The skin margin is inked blue, the deep   margin is inked yellow, caudal and lateral margins are inked black, and   the cranial and medial margins are inked green.  Sections through the   specimen reveal a yellow fatty cut surface with focal hemorrhage. There is a biopsy site identified. [place] is surrounding necrosis.  No   discrete masses are identified.  The biopsy site measures about 1.1 x   0.5 x about 2.2 cm.  The biopsy site is focally 0.5 cm from both the   skin and deep margins.  Representative sections are submitted. Cassette #1 - lateral margin; cassettes #2 and #3 - cranial, caudal,   skin, and deep margins; cassette #4 - caudal, skin, and deep margins;   cassette #5 - cranial, skin, and deep margins, cassette #6 - caudal,   skin, and deep margins, cassette #7 - cranial, skin, and deep margins;   cassette #8 - caudal, skin, and deep margins; cassettes #9 and #10 -   cranial, skin, and deep margins, and cassettes #11 and #12 - medial   margin.  ss.      Fixative:  10% neutral buffered formalin   Tissue removal time:  1330   Tissue fixation time:  1400   Total fixation time:  30 hours     B - The container is labeled Juliane Walton, new skin, caudal margin,   right breast.  Received in formalin is a fragment of yellow adipose   tissue measuring 4.5 x 2 x about 1.3 cm.  There is a suture designating   the new skin, caudal margin.  The margin is inked blue.  Sections   through the specimen reveal no discrete lesions.  Representative   sections are submitted in three cassettes.  ss.  SIO/DKR:[id number]     Fixative:  10% neutral buffered formalin   Tissue removal time:  1347   Tissue fixation time:  1350   Total fixation time: 30 hours and 10 minutes     Microscopic Examination:   A. Procedure   Excision (less than total mastectomy)     Specimen Laterality   Right     + Tumor Site   + Upper outer quadrant     Size (Extent) of DCIS   Estimated size (extent) of DCIS is at least (millimeters) 12 mm       + Additional dimensions (millimeters): 9 mm   + Number of blocks with DCIS: 11   + Number of blocks examined: 12     Histologic Type   Ductal carcinoma in situ   Note: Immunohistochemistry for p63 is performed on blocks A5, A6, and   A7 with adequate controls.  The p63 highlights intact myoepithelial   cells.  E-cadherin is performed on block A5 with adequate controls.  E   cadherin is intact consistent with ductal carcinoma in situ.     + Architectural Patterns (select all that apply)   + Comedo   + Cribriform   + Solid     Nuclear Grade   Grade III (high)     Necrosis   Present, central (expansive \"comedo\" necrosis)     Margins   Uninvolved by DCIS    Distance from closest margin (millimeters):       Less than 1 mm       Specify closest margin (required only if <10mm): Deep, caudal,   cranial       + Distance from other margins (specify millimeter distance       including greater than if appropriate):        Medial: 2 mm   Note: The skin margin inked blue is positive on specimen A.  However,   the new skin caudal margin is negative in specimen B.      Regional Lymph Nodes   No lymph nodes submitted or found     Pathologic Stage Classification (pTNM, AJCC 8th Edition)   Primary Tumor  (pT)   pTis (DCIS): Ductal carcinoma in situ     Regional Lymph Nodes (pN)   Category (pN)   pNX: Regional lymph nodes cannot be assessed (eg, not removed for             pathological study or previously removed)     + Additional Pathologic Findings   + Specify: Fibrocystic changes including apocrine metaplasia, microcyst   formation, and dense fibrosis. Changes consistent with previous biopsy site.     + Ancillary Studies   + Breast Biomarker Testing Performed on Previous Biopsy: Case number   [id number]     + Estrogen Receptor (ER)   + Positive 100%     + Progesterone Receptor (PgR)   + Positive 5%     + Microcalcifications (select all that apply)   +  Present in DCIS   +  Present in nonneoplastic tissue     B.  Sections demonstrate breast tissue with fibrocystic changes   including dense fibrosis and microcyst formation.  There is focal fat   necrosis.  In situ and invasive carcinoma are not identified. *This test was developed and its performance characteristics determined   by [address] [place] [address] has not been cleared or   approved by the U.S. Food and Drug Administration. Pursuant to the   requirements of CLIA, this laboratory has established and verified the   test's accuracy and precision.  Additional information about this type   of test is available upon request.     [id number]   [id number]   11263 x2                                                     <Sign Out Dr. Xiomara Pacheco M.D., F.C.A.P.        [facility]/ [address] [place] [address]  Printed on:  2/15/2021   Merline Cortes [facility]   [facility], [facility]   Original print date: 02/15/2021   Lab and Collection

## 2021-03-17 ENCOUNTER — HOSPITAL ENCOUNTER (OUTPATIENT)
Dept: INFUSION THERAPY | Age: 66
Discharge: HOME | End: 2021-03-17
Payer: COMMERCIAL

## 2021-03-17 ENCOUNTER — OFFICE VISIT (OUTPATIENT)
Dept: ONCOLOGY | Age: 66
End: 2021-03-17
Payer: COMMERCIAL

## 2021-03-17 VITALS
HEART RATE: 80 BPM | RESPIRATION RATE: 16 BRPM | SYSTOLIC BLOOD PRESSURE: 142 MMHG | HEIGHT: 60 IN | DIASTOLIC BLOOD PRESSURE: 67 MMHG | TEMPERATURE: 97.8 F | OXYGEN SATURATION: 99 % | WEIGHT: 157 LBS | BODY MASS INDEX: 30.82 KG/M2

## 2021-03-17 DIAGNOSIS — D05.11 DUCTAL CARCINOMA IN SITU (DCIS) OF RIGHT BREAST: Primary | ICD-10-CM

## 2021-03-17 DIAGNOSIS — Z98.890 STATUS POST RIGHT BREAST LUMPECTOMY: ICD-10-CM

## 2021-03-17 PROCEDURE — 99211 OFF/OP EST MAY X REQ PHY/QHP: CPT

## 2021-03-17 PROCEDURE — 99205 OFFICE O/P NEW HI 60 MIN: CPT | Performed by: INTERNAL MEDICINE

## 2021-03-17 NOTE — PROGRESS NOTES
[facility] [address] [facility] [address], [address] [facility] ([place], [place]) [address]  [facility]  Dept: [phone]  Dept Fax: [phone]: [phone]    Visit Date:3/17/2021     Mariana Welsh is a 63 y.o. female who presents today for:   Chief Complaint   Patient presents with    New Patient     breast CA        HPI: This is a 79-year-old patient with newly diagnosed and DCIS of the right breast.  The patient presents to the medical oncology clinic to discussed postsurgical treatment. She underwent screening mammography on January 4, 2021 it showed a new cluster of calcifications in the right breast upper outer aspect middle depth.  Diagnostic mammography revealed clustered punctate calcifications in the right breast upper outer aspect middle depth.  There were no other suspicious abnormalities seen.  She underwent a stereotactic core biopsy on January 22, 2021. Pathology revealed ductal carcinoma in situ, intermediate nuclear grade, solid type with central necrosis and associated microcalcifications.  Estrogen receptors 100% positive, progesterone receptor 5% positive. Subsequently she met with the surgeon to discuss her surgical treatment options. She decided to proceed with breast conservation therapy. The patient underwent lumpectomy on February 10, 2021. Final pathology report showed:  A. Breast, right, lumpectomy:       Ductal carcinoma in situ, nuclear grade 3, solid and cribriform       types, with comedonecrosis and microcalcifications, pTis (DCIS) NX.    See microscopic for margin status.    Changes consistent with previous biopsy site. B. Breast, right, new skin caudal margin, excision:    Fibrocystic changes including dense fibrosis and microcyst formation.    Fat necrosis.    Negative for malignancy. Due to very close/positive margin the patient underwent reexcision on March 3, 2021. Showed residual and DCIS. New margins were negative. Postsurgical course is unremarkable. Menarche 17. Menopause 55. . Had children in 20's. Did not breastfeed. Deanna Dias recently, took for 3 to 4 years. No family history of breast or ovarian malignancy. HPI   Past Medical History:   Diagnosis Date    Active cochlear Meniere's disease     [facility] ([facility]     Diabetes mellitus ([facility]     type 2    Seasonal allergies     Sleep apnea     Thyroid disease       Past Surgical History:   Procedure Laterality Date    BLADDER SUSPENSION      BREAST LUMPECTOMY Right 2/10/2021    RIGHT BREAST LUMPETOMY, PREOP NEEDLE LOC performed by Sudhir Thompson MD at [address] [facility] LUMPECTOMY Right 3/3/2021    RE-EXCISION MULTIPLE MARGINS, RIGHT BREAST performed by Sudhir Thompson MD at [facility]      Dr. Rockie Fothergill ISAIAS STEROTACTIC LOC BREAST BIOPSY RIGHT Right 2021    ISAIAS STEROTACTIC LOC BREAST BIOPSY RIGHT 2021 [address] [place] [address]    US GUIDED NEEDLE LOC OF RIGHT BREAST Right 2/10/2021    US GUIDED NEEDLE LOC OF RIGHT BREAST 2/10/2021 Cierra Lou MD [facility]      Family History   Problem Relation Age of Onset    High Blood Pressure Mother     Heart Disease Mother     Atrial Fibrillation Mother     Cancer Father         leukemia & Lung    Colon Cancer Maternal Grandmother 64      Social History     Tobacco Use    Smoking status: Never Smoker    Smokeless tobacco: Never Used   Substance Use Topics    Alcohol use: No      Current Outpatient Medications   Medication Sig Dispense Refill    Triamterene-HCTZ (DYAZIDE PO) Take by mouth      Misc Natural Products (CALCIUM PLUS ADVANCED PO) Take by mouth      Multiple Vitamins-Minerals (VITAMIN D3 COMPLETE PO) Take by mouth      METFORMIN HCL PO Take 500 mg by mouth 4 times daily      pravastatin (PRAVACHOL) 20 MG tablet Take 20 mg by mouth daily.  Levothyroxine Sodium 75 MCG CAPS Take  by mouth Daily.       aspirin 81 MG tablet Take 81 mg by mouth daily. No current facility-administered medications for this visit. No Known Allergies   Health Maintenance   Topic Date Due    Hepatitis C screen  Never done    Pneumococcal 0-64 years Vaccine (1 of 1 - PPSV23) Never done    Lipid screen  Never done    HIV screen  Never done    COVID-19 Vaccine (1) Never done    DTaP/Tdap/Td vaccine (1 - Tdap) Never done    Shingles Vaccine (1 of 2) Never done    Colon cancer screen colonoscopy  Never done    Flu vaccine (1) Never done    Potassium monitoring  02/01/2022    Creatinine monitoring  02/01/2022    Breast cancer screen  02/10/2022    Cervical cancer screen  12/02/2022    Hepatitis A vaccine  Aged Out    Hib vaccine  Aged Out    Meningococcal (ACWY) vaccine  Aged Out        Subjective:   Review of Systems   Constitutional: Negative for activity change, appetite change, fatigue and fever. HENT: Negative for congestion, dental problem, facial swelling, hearing loss, mouth sores, nosebleeds, sore throat, tinnitus and trouble swallowing. Eyes: Negative for discharge and visual disturbance. Respiratory: Negative for cough, chest tightness, shortness of breath and wheezing. Cardiovascular: Negative for chest pain, palpitations and leg swelling. Gastrointestinal: Negative for abdominal distention, abdominal pain, blood in stool, constipation, diarrhea, nausea, rectal pain and vomiting. Endocrine: Negative for cold intolerance, polydipsia and polyuria. Genitourinary: Negative for decreased urine volume, difficulty urinating, dysuria, flank pain, hematuria and urgency. Musculoskeletal: Negative for arthralgias, back pain, gait problem, joint swelling, myalgias and neck stiffness. Skin: Negative for color change, rash and wound. Neurological: Negative for dizziness, tremors, seizures, speech difficulty, weakness, light-headedness, numbness and headaches. Hematological: Negative for adenopathy. Does not bruise/bleed easily. Psychiatric/Behavioral: Negative for confusion and sleep disturbance. The patient is not nervous/anxious. Objective:   Physical Exam  Vitals signs reviewed. Constitutional:       General: She is not in acute distress. Appearance: She is well-developed. HENT:      Head: Normocephalic. Mouth/Throat:      Pharynx: No oropharyngeal exudate. Eyes:      General: No scleral icterus. Right eye: No discharge. Left eye: No discharge. Pupils: Pupils are equal, round, and reactive to light. Neck:      Musculoskeletal: Normal range of motion and neck supple. Thyroid: No thyromegaly. Vascular: No JVD. Trachea: No tracheal deviation. Cardiovascular:      Rate and Rhythm: Normal rate. Heart sounds: Normal heart sounds. No murmur. No friction rub. No gallop. Pulmonary:      Effort: Pulmonary effort is normal. No respiratory distress. Breath sounds: Normal breath sounds. No stridor. No wheezing or rales. Chest:      Chest wall: No tenderness. Breasts:         Right: Skin change (s/p lumpectomy. Lumpectomy incision nicely healed.) present. Abdominal:      General: Bowel sounds are normal. There is no distension. Palpations: Abdomen is soft. There is no mass. Tenderness: There is no abdominal tenderness. There is no rebound. Musculoskeletal: Normal range of motion. Comments: Good range of motion in all four extremities. Lymphadenopathy:      Cervical: No cervical adenopathy. Skin:     General: Skin is warm. Findings: No erythema or rash. Neurological:      Mental Status: She is alert and oriented to person, place, and time. Cranial Nerves: No cranial nerve deficit. Motor: No abnormal muscle tone. Deep Tendon Reflexes: Reflexes are normal and symmetric. Psychiatric:         Behavior: Behavior normal.         Thought Content:  Thought content normal.         Judgment: Judgment normal.         BP (!) 142/67 (Site: Left Upper Arm, Position: Sitting, Cuff Size: Medium Adult)   Pulse 80   Temp 97.8 °F (36.6 °C) (Infrared)   Resp 16   Ht 5' (1.524 m)   Wt 157 lb (71.2 kg)   SpO2 99%   BMI 30.66 kg/m²      ECOG status is 0    Imaging studies and labs:     Lab Results   Component Value Date    HGB 14.5 02/01/2021    HCT 45.7 02/01/2021       Chemistry        Component Value Date/Time     02/01/2021 1059    K 4.0 02/01/2021 1059     02/01/2021 1059    CO2 28 02/01/2021 1059    BUN 17 02/01/2021 1059    CREATININE 0.9 02/01/2021 1059        Component Value Date/Time    CALCIUM 10.9 (H) 02/01/2021 1059            Assessment/Plan:   1. Right breast DCIS. ER positive, CO positive. Patient is s/p lumpectomy. Radiation therapy is the standard for patients treated with BCT, though it may be reasonable to omit in selected patients with advanced age, extensive comorbidities, or small foci of low-grade disease resected with negative margins. None applies to the patient. She had DCIS larger than 10 mm in size with high histologic grade. RT after lumpectomy reduces the risk of local invasive and noninvasive recurrences. Randomized trials have shown that adjuvant RT significantly reduces the risk of in-breast tumor recurrence by 50 %t or greater compared with excision alone. The risk reduction for local recurrence associated with RT is long-lasting, though is not associated with a survival advantage. Patient has consult with radiation oncologist tomorrow  Following local treatment, the decision to administer endocrine therapy to reduce the risk of subsequent cancers depends upon the tumor hormone receptor status and the choice of local therapy.   The primary role of systemic treatment is to reduce the risk of invasive breast cancer in the ipsilateral or contralateral breast. The [employer]/NSABP B-35 trial, which enrolled over 3100 postmenopausal women with hormone receptor-positive DCIS who underwent BCT, demonstrated that Arimidex resulted in a decreased rate of breast cancer events at 10 years compared with tamoxifen. Toxicities associated with aromatase inhibitors include loss of bone density, fractures, and cardiovascular risk. Diagnosis Orders   1. Ductal carcinoma in situ (DCIS) of right breast     2. Status post right breast lumpectomy          Plan:   Return in about 8 weeks (around 5/12/2021). Orders Placed:   No orders of the defined types were placed in this encounter. Medications Prescribed:   No orders of the defined types were placed in this encounter. Discussed use, benefit, and side effectsof prescribed medications. All patient questions answered. Pt voiced understanding. Instructed to continue current medications, diet and exercise. Patient agreed with treatment plan. Follow up as directed.     Electronically signed by Mathilda Libman, MD on 3/17/21 at 3:17 PM EDT

## 2021-03-23 ENCOUNTER — HOSPITAL ENCOUNTER (OUTPATIENT)
Dept: RADIATION ONCOLOGY | Age: 66
Discharge: HOME | End: 2021-03-23
Payer: COMMERCIAL

## 2021-03-23 VITALS
BODY MASS INDEX: 30.39 KG/M2 | SYSTOLIC BLOOD PRESSURE: 134 MMHG | WEIGHT: 154.8 LBS | TEMPERATURE: 98 F | HEIGHT: 60 IN | HEART RATE: 74 BPM | OXYGEN SATURATION: 100 % | RESPIRATION RATE: 16 BRPM | DIASTOLIC BLOOD PRESSURE: 62 MMHG

## 2021-03-23 PROCEDURE — 99204 OFFICE O/P NEW MOD 45 MIN: CPT | Performed by: RADIOLOGY

## 2021-03-23 PROCEDURE — 99202 OFFICE O/P NEW SF 15 MIN: CPT | Performed by: RADIOLOGY

## 2021-03-23 NOTE — PROGRESS NOTES
PO), Take by mouth, Disp: , Rfl:     Multiple Vitamins-Minerals (VITAMIN D3 COMPLETE PO), Take by mouth, Disp: , Rfl:     METFORMIN HCL PO, Take 500 mg by mouth 4 times daily, Disp: , Rfl:     pravastatin (PRAVACHOL) 20 MG tablet, Take 20 mg by mouth daily. , Disp: , Rfl:     Levothyroxine Sodium 75 MCG CAPS, Take  by mouth Daily. , Disp: , Rfl:     aspirin 81 MG tablet, Take 81 mg by mouth daily. , Disp: , Rfl:       ADDITIONAL COMMENTS: Patient here for consult with Dr. Kassie Alcaraz.        Juan Manuel Toure BSN, RN

## 2021-03-23 NOTE — PROGRESS NOTES
[address] [facility] [address], [address]        [facility], [address] [facility]        Natalie Batch: [phone]        F: [phone]       [email or web address]     Date of Service: 3/23/2021  Patient ID: Zak Mix   : 1955  MRN: [id number]   Acct Number: [de-identified]         Requesting Provider: Dr. Tanisha Pappas ([facility]) Dr. Kamla Alvarado (Surgery)  Reason for request: Evaluation for the potential role of adjuvant radiation therapy. CONSULTANT: Usama Montemayor    CHIEF COMPLAINT: Evaluation for the potential role of adjuvant radiation therapy. HISTORY OF PRESENT ILLNESS:  Zak Mix is a 63 y.o. female who underwent screening mammography and had a new cluster of calcifications in the right breast upper outer aspect middle depth.  She had scattered benign calcifications in both breasts.  Diagnostic mammography was performed and revealed clustered punctate calcifications in the right breast upper outer aspect middle depth.  There were no other suspicious abnormalities seen.  She underwent a stereotactic core biopsy. Pathology revealed ductal carcinoma in situ, intermediate nuclear grade, solid type with central necrosis and associated microcalcifications.  Estrogen receptors were 100% positive progesterone receptors were 5% positive. She was then seen by surgery for evaluation and treatment recommendations. She was agreeable to proceeding with breast conservation therapy (BCT) and underwent a lumpectomy on 02/10/21 with pathology consistent with high grade DCIS pTisNX with close margins (<2mm). She then underwent a re-resection of disease on 21 with clear margins. Cancer Staging  Ductal carcinoma in situ (DCIS) of right breast  Staging form: Breast, AJCC 8th Edition  - Pathologic stage from 3/15/2021: Stage 0 (pTis (DCIS), pN0, cM0, G3, ER+, MT+, HER2: Unknown) - Signed by Sudhir Thompson MD on 3/15/2021    Ms. Zak Mix is a 63 y.o. female with above mentioned oncologic history presenting today for initial consultation regarding the potential role for radiation therapy. She presents today with no new significant complaints overall. She has no complaints of erythema or skin changes in the treated breast. She has no complaints of pain in the treated breast. She has no complaints of swelling or edema in the treated breast. She has no complaints of swelling or edema in the ipsilateral arm of the treated breast. She has no complaints of decreased range of motion in the ipsilateral arm of the treated breast. She is otherwise well and does not have any other significant complaints at today's visit.       Past Medical History:   Diagnosis Date    Active cochlear Meniere's disease     [facility] ([facility] [address]     Diabetes mellitus ([facility] [address]     type 2    Seasonal allergies     Sleep apnea     Thyroid disease         Past Surgical History:   Procedure Laterality Date    BLADDER SUSPENSION  1990    BREAST LUMPECTOMY Right 2/10/2021    RIGHT BREAST LUMPETOMY, PREOP NEEDLE LOC performed by Lalo Shields MD at [address] [facility] LUMPECTOMY Right 3/3/2021    RE-EXCISION MULTIPLE MARGINS, RIGHT BREAST performed by Lalo Shields MD at [place] [address]  2006    Dr. Alpha Felty ISAIAS STEROTACTIC LOC BREAST BIOPSY RIGHT Right 01/22/2021    ISAIAS STEROTACTIC LOC BREAST BIOPSY RIGHT 1/22/2021 [facility]    US GUIDED NEEDLE LOC OF RIGHT BREAST Right 2/10/2021    US GUIDED NEEDLE LOC OF RIGHT BREAST 2/10/2021 Deyvi Mcnair MD [facility]       Family History   Problem Relation Age of Onset    High Blood Pressure Mother     Heart Disease Mother     Atrial Fibrillation Mother     Cancer Father         leukemia & Lung    Colon Cancer Maternal Grandmother 64       Social History     Socioeconomic History    Marital status:      Spouse name: Gene    Number of children: 3  Years of education: Not on file    Highest education level: Not on file   Occupational History    Not on file   Social Needs    Financial resource strain: Not on file    Food insecurity     Worry: Not on file     Inability: Not on file    Transportation needs     Medical: Not on file     Non-medical: Not on file   Tobacco Use    Smoking status: Never Smoker    Smokeless tobacco: Never Used   Substance and Sexual Activity    Alcohol use: No    Drug use: No    Sexual activity: Yes     Partners: Male     Birth control/protection: Post-menopausal   Lifestyle    Physical activity     Days per week: Not on file     Minutes per session: Not on file    Stress: Not on file   Relationships    Social connections     Talks on phone: Not on file     Gets together: Not on file     Attends Muslim service: Not on file     Active member of club or organization: Not on file     Attends meetings of clubs or organizations: Not on file     Relationship status: Not on file    Intimate partner violence     Fear of current or ex partner: Not on file     Emotionally abused: Not on file     Physically abused: Not on file     Forced sexual activity: Not on file   Other Topics Concern    Not on file   Social History Narrative    Not on file       No Known Allergies     Current Outpatient Medications   Medication Sig Dispense Refill    Triamterene-HCTZ (DYAZIDE PO) Take by mouth      Misc Natural Products (CALCIUM PLUS ADVANCED PO) Take by mouth      Multiple Vitamins-Minerals (VITAMIN D3 COMPLETE PO) Take by mouth      METFORMIN HCL PO Take 500 mg by mouth 4 times daily      pravastatin (PRAVACHOL) 20 MG tablet Take 20 mg by mouth daily.  Levothyroxine Sodium 75 MCG CAPS Take  by mouth Daily.  aspirin 81 MG tablet Take 81 mg by mouth daily. No current facility-administered medications for this encounter.         No outpatient medications have been marked as taking for the 3/23/21 encounter [facility] Encounter) with Pierre Thompson MD.       LABORATORY STUDIES:   Onc labs: No results found for: PSA, CEA, LDH, AFP    PATHOLOGY: As per HPI above. RADIOLOGIC STUDIES: As per HPI above. REVIEW OF SYSTEMS: AS per HPI above. PHYSICAL EXAMINATION:   VITAL SIGNS: /62   Pulse 74   Temp 98 °F (36.7 °C) (Infrared)   Resp 16   Ht 5' (1.524 m)   Wt 154 lb 12.8 oz (70.2 kg)   SpO2 100%   BMI 30.23 kg/m²     ECO - Asymptomatic (Fully active, able to carry on all pre-disease activities without restriction)    PAIN: 0/10    GENERAL: Pleasant well-developed adult. In no acute distress. Alert and oriented ×3  HEENT: Normocephalic, atraumatic. LUNGS: Unlabored  CARDIAC: Regular rate and rhythm  BREAST: Deferred to time of CT simulation  ABDOMEN: Soft, nontender, nondistended  EXTREMITIES: No significant LE edema b/l  NEUROLOGIC: No grossly apparent focal deficits. Cranial nerves grossly intact    ASSESSMENT:  Cancer Staging  Ductal carcinoma in situ (DCIS) of right breast  Staging form: Breast, AJCC 8th Edition  - Pathologic stage from 3/15/2021: Stage 0 (pTis (DCIS), pN0, cM0, G3, ER+, IL+, HER2: Unknown) - Signed by Lalo Shields MD on 3/15/2021    PLAN:  With regards to radiation to the right breast, I discussed the possible short-term side effects of skin irritation (causing redness, dryness, or peeling), swelling and tenderness of the right breast, tiredness, low blood counts (causing infection or bleeding) and hair loss in treated area. Possible long-term side effects discussed included:  change in the cosmetic appearance of the right breast (change in shape, size, and texture of the breast), hyper/hypo-pigmentation of the skin,   scarring of the lung (causing shortness of breath and cough), swelling of the right arm i.e. lymphedema (causing pain), damage to the nerves (causing numbness, weakness, or paralysis) and rib fracture.     Continues to heal well from recent re-excision for close/positive post op margins, new margins negative for DCIS. Will wait for further healing from recent surgery prior to proceeding with CT simulation for treatment planning. Recently seen by medical oncology for initial consultation with recommendations for adjuvant anti-estrogen therapy following adjuvant whole breast radiation therapy. We discussed the risks, benefits, and rationale for proceeding with radiation therapy and all of their questions and concerns were answered and addressed to their satisfaction. Consent was signed at today's visit with CT simulation for treatment planning to be performed in the next 1-2 weeks. They have our clinic number to call with any questions or concerns if they were to have any prior to next visit. Thank you for allowing my assistance in the care of your patient. Electronically signed by Rodolfo Patel MD on 3/23/21 at 5:52 PM EDT     ATTESTATION: 45 minutes were spent with the patient at today's visit reviewing pertinent information related to their oncologic diagnosis, including any recent labs, imaging, follow ups and plan of care going forward.     CC:Dr. Hugh Mari ([facility]) Dr. Sung Maharaj (Surgery)   ACC:[facility]

## 2021-03-26 ENCOUNTER — HOSPITAL ENCOUNTER (OUTPATIENT)
Dept: RADIATION ONCOLOGY | Age: 66
Discharge: HOME | End: 2021-03-26
Payer: COMMERCIAL

## 2021-03-26 PROCEDURE — 99214 OFFICE O/P EST MOD 30 MIN: CPT | Performed by: NURSE PRACTITIONER

## 2021-03-26 PROCEDURE — 99212 OFFICE O/P EST SF 10 MIN: CPT | Performed by: NURSE PRACTITIONER

## 2021-03-31 ENCOUNTER — HOSPITAL ENCOUNTER (OUTPATIENT)
Dept: RADIATION ONCOLOGY | Age: 66
Discharge: HOME | End: 2021-03-31
Attending: RADIOLOGY
Payer: COMMERCIAL

## 2021-03-31 ENCOUNTER — HOSPITAL ENCOUNTER (OUTPATIENT)
Dept: CT IMAGING | Age: 66
Discharge: HOME | End: 2021-03-31
Payer: COMMERCIAL

## 2021-03-31 DIAGNOSIS — D05.11 INTRADUCTAL CARCINOMA IN SITU OF RIGHT BREAST: ICD-10-CM

## 2021-03-31 PROCEDURE — 77332 RADIATION TREATMENT AID(S): CPT | Performed by: RADIOLOGY

## 2021-03-31 PROCEDURE — 77263 THER RADIOLOGY TX PLNG CPLX: CPT | Performed by: RADIOLOGY

## 2021-03-31 PROCEDURE — 3209999900 CT GUIDE RADIATION THERAPY NO CHARGE

## 2021-03-31 PROCEDURE — 77290 THER RAD SIMULAJ FIELD CPLX: CPT | Performed by: RADIOLOGY

## 2021-03-31 ASSESSMENT — ENCOUNTER SYMPTOMS
COLOR CHANGE: 0
SHORTNESS OF BREATH: 0
SORE THROAT: 0
COUGH: 0
VOMITING: 0
CHEST TIGHTNESS: 0
ABDOMINAL PAIN: 0
CONSTIPATION: 0
WHEEZING: 0
DIARRHEA: 0
RECTAL PAIN: 0
FACIAL SWELLING: 0
NAUSEA: 0
TROUBLE SWALLOWING: 0
BLOOD IN STOOL: 0
ABDOMINAL DISTENTION: 0
BACK PAIN: 0
EYE DISCHARGE: 0

## 2021-04-06 ENCOUNTER — HOSPITAL ENCOUNTER (OUTPATIENT)
Dept: RADIATION ONCOLOGY | Age: 66
End: 2021-04-06
Attending: RADIOLOGY
Payer: COMMERCIAL

## 2021-04-06 PROCEDURE — 77300 RADIATION THERAPY DOSE PLAN: CPT | Performed by: RADIOLOGY

## 2021-04-06 PROCEDURE — 77334 RADIATION TREATMENT AID(S): CPT | Performed by: RADIOLOGY

## 2021-04-06 PROCEDURE — 77295 3-D RADIOTHERAPY PLAN: CPT | Performed by: RADIOLOGY

## 2021-04-07 ENCOUNTER — HOSPITAL ENCOUNTER (OUTPATIENT)
Dept: RADIATION ONCOLOGY | Age: 66
End: 2021-04-07
Attending: RADIOLOGY
Payer: COMMERCIAL

## 2021-04-07 PROCEDURE — 77334 RADIATION TREATMENT AID(S): CPT | Performed by: RADIOLOGY

## 2021-04-12 ENCOUNTER — HOSPITAL ENCOUNTER (OUTPATIENT)
Dept: RADIATION ONCOLOGY | Age: 66
Discharge: HOME | End: 2021-04-12
Attending: RADIOLOGY
Payer: COMMERCIAL

## 2021-04-12 DIAGNOSIS — D05.11 DUCTAL CARCINOMA IN SITU (DCIS) OF RIGHT BREAST: Primary | ICD-10-CM

## 2021-04-12 PROCEDURE — 77336 RADIATION PHYSICS CONSULT: CPT | Performed by: RADIOLOGY

## 2021-04-12 PROCEDURE — 77280 THER RAD SIMULAJ FIELD SMPL: CPT | Performed by: RADIOLOGY

## 2021-04-12 PROCEDURE — 77412 RADIATION TX DELIVERY LVL 3: CPT | Performed by: RADIOLOGY

## 2021-04-13 ENCOUNTER — HOSPITAL ENCOUNTER (OUTPATIENT)
Dept: RADIATION ONCOLOGY | Age: 66
Discharge: HOME | End: 2021-04-13
Attending: RADIOLOGY
Payer: COMMERCIAL

## 2021-04-13 PROCEDURE — 77387 GUIDANCE FOR RADJ TX DLVR: CPT | Performed by: RADIOLOGY

## 2021-04-13 PROCEDURE — G6002 STEREOSCOPIC X-RAY GUIDANCE: HCPCS | Performed by: RADIOLOGY

## 2021-04-13 PROCEDURE — 77412 RADIATION TX DELIVERY LVL 3: CPT | Performed by: RADIOLOGY

## 2021-04-14 ENCOUNTER — HOSPITAL ENCOUNTER (OUTPATIENT)
Dept: RADIATION ONCOLOGY | Age: 66
Discharge: HOME | End: 2021-04-14
Attending: RADIOLOGY
Payer: COMMERCIAL

## 2021-04-14 PROCEDURE — G6002 STEREOSCOPIC X-RAY GUIDANCE: HCPCS | Performed by: RADIOLOGY

## 2021-04-14 PROCEDURE — 77412 RADIATION TX DELIVERY LVL 3: CPT | Performed by: RADIOLOGY

## 2021-04-14 PROCEDURE — 77387 GUIDANCE FOR RADJ TX DLVR: CPT | Performed by: RADIOLOGY

## 2021-04-15 ENCOUNTER — HOSPITAL ENCOUNTER (OUTPATIENT)
Dept: RADIATION ONCOLOGY | Age: 66
Discharge: HOME | End: 2021-04-15
Attending: RADIOLOGY
Payer: COMMERCIAL

## 2021-04-15 PROCEDURE — 77412 RADIATION TX DELIVERY LVL 3: CPT | Performed by: RADIOLOGY

## 2021-04-15 PROCEDURE — 77387 GUIDANCE FOR RADJ TX DLVR: CPT | Performed by: RADIOLOGY

## 2021-04-15 PROCEDURE — G6002 STEREOSCOPIC X-RAY GUIDANCE: HCPCS | Performed by: RADIOLOGY

## 2021-04-16 ENCOUNTER — HOSPITAL ENCOUNTER (OUTPATIENT)
Dept: RADIATION ONCOLOGY | Age: 66
Discharge: HOME | End: 2021-04-16
Attending: RADIOLOGY
Payer: COMMERCIAL

## 2021-04-16 PROCEDURE — G6002 STEREOSCOPIC X-RAY GUIDANCE: HCPCS | Performed by: RADIOLOGY

## 2021-04-16 PROCEDURE — 77387 GUIDANCE FOR RADJ TX DLVR: CPT | Performed by: RADIOLOGY

## 2021-04-16 PROCEDURE — 77427 RADIATION TX MANAGEMENT X5: CPT | Performed by: RADIOLOGY

## 2021-04-16 PROCEDURE — 77412 RADIATION TX DELIVERY LVL 3: CPT | Performed by: RADIOLOGY

## 2021-04-19 ENCOUNTER — HOSPITAL ENCOUNTER (OUTPATIENT)
Dept: RADIATION ONCOLOGY | Age: 66
Discharge: HOME | End: 2021-04-19
Attending: RADIOLOGY
Payer: COMMERCIAL

## 2021-04-19 ENCOUNTER — HOSPITAL ENCOUNTER (OUTPATIENT)
Dept: PHYSICAL THERAPY | Age: 66
Setting detail: THERAPIES SERIES
Discharge: HOME | End: 2021-04-19
Payer: COMMERCIAL

## 2021-04-19 PROCEDURE — 77336 RADIATION PHYSICS CONSULT: CPT | Performed by: RADIOLOGY

## 2021-04-19 PROCEDURE — G6002 STEREOSCOPIC X-RAY GUIDANCE: HCPCS | Performed by: RADIOLOGY

## 2021-04-19 PROCEDURE — 77412 RADIATION TX DELIVERY LVL 3: CPT | Performed by: RADIOLOGY

## 2021-04-19 PROCEDURE — 77387 GUIDANCE FOR RADJ TX DLVR: CPT | Performed by: RADIOLOGY

## 2021-04-19 PROCEDURE — 97140 MANUAL THERAPY 1/> REGIONS: CPT

## 2021-04-19 NOTE — PROGRESS NOTES
[facility]  ONCOLOGY REHABILITATION  PHYSICAL THERAPY  [x] DAILY NOTE [] PROGRESS NOTE [] DISCHARGE NOTE    [x] [facility]     Date: 2021  Patient Name:  Clayton Woods  : 1955  MRN: [id number]    Referring Practitioner Gabrielle Dunne MD with Dr. Ashkan So to follow   Diagnosis Intraductal carcinoma in situ of right breast [D05.11]    Treatment Diagnosis Postural abnormality, R shoulder stiffness, Z71.9   Date of Evaluation 21    Additional Pertinent History DM II       Functional Outcome Measure Used O: QuickDASH   Functional Outcome Score 2.3% impaired (21)        Insurance: Primary: Payor: Leonel Mckeon /  /  / ,   Secondary: Carol Andrade Information: No maintenance, aquatics and modalities covered   Visit # 2, 2/10 for progress note   Visits Allowed: 60 visits per calendar year   Recertification Date: 08   Physician Follow-Up: Radiation thru 21, 21 Mrozek   Physician Orders: Prehab   History of Present Illness: 21 R DCIS ER/MT+, 2/10/21 R lumpectomy DCIS, radiation 21 - 21        SUBJECTIVE: States doing well so far with radiation but notes lower back spasms over the weekend. Admits it may be due to gardening. Reports complying with HEP 2x per day of pec stretches. Has been walking 6x/week at 6 miles per day.       TREATMENT   Precautions:    Pain: 0/10    X in shaded column indicates activity completed today   Modalities Parameters/  Location  Notes         Manual Therapy Time/Technique  Notes   PORi protocol to R upper quadrant 40 minutes x Performed due firm tissue at superior, medial and inferior breast and concern for decreased lymphatic drainage secondary to radiation   Exercise/Intervention   Notes              Specific Interventions for Next Treatment:  PORi protocol for gentle manual lymphatic drainage and myofascial release, gentle stretches, strengthening, conditioning, balance, posture, lymphedema education as needed    Activity Tolerance: Patient tolerated treatment well    ASSESSMENT:  Assessment: Performed manual lymphatic drainage today due to firm tissue throughout breast. Pt reports significant improvement in tissue texture by end of session. R shoulder flexion and abduction PROM 180 deg. GOALS:  Patient Goal: Get through radiation without any problems     Short Term Goals to be met in 12 weeks:  1. See LTGs     Long Term Goals to be met in 12 weeks:   1. Pt to demo right shoulder PROM flexion improved from 178 deg to 180 deg after radiation for ADLs. 2. Pt to demo right shoulder PROM abduction controlled at 180 deg after radiation for ADLs. 3. Pt to demo posture improved from poor to good with neutral head and neck alignment for ease of ADLs. 4. Pt to demo QuickDASH score returned to 2.3% after radiation for return to previous level of functioning for survivorship. 5. Pt to demo BFI score returns to baseline after radiation has completed for return to recreational activities. Patient Education: To continue with HEP of stretches 2x per day, add MLD 1x per day and to remain physically active.   Education Outcome: Verbalized understanding  Education Barriers: None    PLAN: Continue established plan of care      Time In 0820   Time Out 0900   Timed Code Minutes: 40 min   Total Treatment Time: 40 min       Electronically Signed by: Krystian Ortega PT, DPT, [facility] [id number] 4/19/2021

## 2021-04-20 ENCOUNTER — HOSPITAL ENCOUNTER (OUTPATIENT)
Dept: RADIATION ONCOLOGY | Age: 66
Discharge: HOME | End: 2021-04-20
Attending: RADIOLOGY
Payer: COMMERCIAL

## 2021-04-20 PROCEDURE — 77412 RADIATION TX DELIVERY LVL 3: CPT | Performed by: RADIOLOGY

## 2021-04-20 PROCEDURE — G6002 STEREOSCOPIC X-RAY GUIDANCE: HCPCS | Performed by: RADIOLOGY

## 2021-04-20 PROCEDURE — 77387 GUIDANCE FOR RADJ TX DLVR: CPT | Performed by: RADIOLOGY

## 2021-04-21 ENCOUNTER — HOSPITAL ENCOUNTER (OUTPATIENT)
Dept: RADIATION ONCOLOGY | Age: 66
Discharge: HOME | End: 2021-04-21
Attending: RADIOLOGY
Payer: COMMERCIAL

## 2021-04-21 PROCEDURE — G6002 STEREOSCOPIC X-RAY GUIDANCE: HCPCS | Performed by: RADIOLOGY

## 2021-04-21 PROCEDURE — 77387 GUIDANCE FOR RADJ TX DLVR: CPT | Performed by: RADIOLOGY

## 2021-04-21 PROCEDURE — 77412 RADIATION TX DELIVERY LVL 3: CPT | Performed by: RADIOLOGY

## 2021-04-22 ENCOUNTER — HOSPITAL ENCOUNTER (OUTPATIENT)
Dept: RADIATION ONCOLOGY | Age: 66
Discharge: HOME | End: 2021-04-22
Attending: RADIOLOGY
Payer: COMMERCIAL

## 2021-04-22 ENCOUNTER — APPOINTMENT (OUTPATIENT)
Dept: PHYSICAL THERAPY | Age: 66
End: 2021-04-22
Payer: COMMERCIAL

## 2021-04-22 PROCEDURE — 77412 RADIATION TX DELIVERY LVL 3: CPT | Performed by: RADIOLOGY

## 2021-04-22 PROCEDURE — G6002 STEREOSCOPIC X-RAY GUIDANCE: HCPCS | Performed by: RADIOLOGY

## 2021-04-22 PROCEDURE — 77387 GUIDANCE FOR RADJ TX DLVR: CPT | Performed by: RADIOLOGY

## 2021-04-23 ENCOUNTER — HOSPITAL ENCOUNTER (OUTPATIENT)
Dept: RADIATION ONCOLOGY | Age: 66
Discharge: HOME | End: 2021-04-23
Attending: RADIOLOGY
Payer: COMMERCIAL

## 2021-04-23 PROCEDURE — 77427 RADIATION TX MANAGEMENT X5: CPT | Performed by: RADIOLOGY

## 2021-04-23 PROCEDURE — 77412 RADIATION TX DELIVERY LVL 3: CPT | Performed by: RADIOLOGY

## 2021-04-23 PROCEDURE — G6002 STEREOSCOPIC X-RAY GUIDANCE: HCPCS | Performed by: RADIOLOGY

## 2021-04-23 PROCEDURE — 77387 GUIDANCE FOR RADJ TX DLVR: CPT | Performed by: RADIOLOGY

## 2021-04-26 ENCOUNTER — HOSPITAL ENCOUNTER (OUTPATIENT)
Dept: RADIATION ONCOLOGY | Age: 66
Discharge: HOME | End: 2021-04-26
Attending: RADIOLOGY
Payer: COMMERCIAL

## 2021-04-26 PROCEDURE — 77336 RADIATION PHYSICS CONSULT: CPT | Performed by: RADIOLOGY

## 2021-04-26 PROCEDURE — 77412 RADIATION TX DELIVERY LVL 3: CPT | Performed by: RADIOLOGY

## 2021-04-26 PROCEDURE — 77387 GUIDANCE FOR RADJ TX DLVR: CPT | Performed by: RADIOLOGY

## 2021-04-26 PROCEDURE — G6002 STEREOSCOPIC X-RAY GUIDANCE: HCPCS | Performed by: RADIOLOGY

## 2021-04-27 ENCOUNTER — HOSPITAL ENCOUNTER (OUTPATIENT)
Dept: RADIATION ONCOLOGY | Age: 66
Discharge: HOME | End: 2021-04-27
Attending: RADIOLOGY
Payer: COMMERCIAL

## 2021-04-27 PROCEDURE — G6002 STEREOSCOPIC X-RAY GUIDANCE: HCPCS | Performed by: RADIOLOGY

## 2021-04-27 PROCEDURE — 77412 RADIATION TX DELIVERY LVL 3: CPT | Performed by: RADIOLOGY

## 2021-04-27 PROCEDURE — 77387 GUIDANCE FOR RADJ TX DLVR: CPT | Performed by: RADIOLOGY

## 2021-04-28 ENCOUNTER — HOSPITAL ENCOUNTER (OUTPATIENT)
Dept: RADIATION ONCOLOGY | Age: 66
Discharge: HOME | End: 2021-04-28
Attending: RADIOLOGY
Payer: COMMERCIAL

## 2021-04-28 PROCEDURE — 77412 RADIATION TX DELIVERY LVL 3: CPT | Performed by: RADIOLOGY

## 2021-04-28 PROCEDURE — 77387 GUIDANCE FOR RADJ TX DLVR: CPT | Performed by: RADIOLOGY

## 2021-04-28 PROCEDURE — G6002 STEREOSCOPIC X-RAY GUIDANCE: HCPCS | Performed by: RADIOLOGY

## 2021-04-29 ENCOUNTER — HOSPITAL ENCOUNTER (OUTPATIENT)
Dept: RADIATION ONCOLOGY | Age: 66
Discharge: HOME | End: 2021-04-29
Attending: RADIOLOGY
Payer: COMMERCIAL

## 2021-04-29 PROCEDURE — 77307 TELETHX ISODOSE PLAN CPLX: CPT | Performed by: RADIOLOGY

## 2021-04-29 PROCEDURE — 77412 RADIATION TX DELIVERY LVL 3: CPT | Performed by: RADIOLOGY

## 2021-04-29 PROCEDURE — 77387 GUIDANCE FOR RADJ TX DLVR: CPT | Performed by: RADIOLOGY

## 2021-04-29 PROCEDURE — G6002 STEREOSCOPIC X-RAY GUIDANCE: HCPCS | Performed by: RADIOLOGY

## 2021-04-29 PROCEDURE — 77334 RADIATION TREATMENT AID(S): CPT | Performed by: RADIOLOGY

## 2021-04-30 ENCOUNTER — HOSPITAL ENCOUNTER (OUTPATIENT)
Dept: RADIATION ONCOLOGY | Age: 66
Discharge: HOME | End: 2021-04-30
Attending: RADIOLOGY
Payer: COMMERCIAL

## 2021-04-30 PROCEDURE — 77412 RADIATION TX DELIVERY LVL 3: CPT | Performed by: RADIOLOGY

## 2021-04-30 PROCEDURE — 77334 RADIATION TREATMENT AID(S): CPT | Performed by: RADIOLOGY

## 2021-04-30 PROCEDURE — 77427 RADIATION TX MANAGEMENT X5: CPT | Performed by: RADIOLOGY

## 2021-04-30 PROCEDURE — G6002 STEREOSCOPIC X-RAY GUIDANCE: HCPCS | Performed by: RADIOLOGY

## 2021-04-30 PROCEDURE — 77387 GUIDANCE FOR RADJ TX DLVR: CPT | Performed by: RADIOLOGY

## 2021-05-03 ENCOUNTER — HOSPITAL ENCOUNTER (OUTPATIENT)
Dept: RADIATION ONCOLOGY | Age: 66
Discharge: HOME | End: 2021-05-03
Attending: RADIOLOGY
Payer: COMMERCIAL

## 2021-05-03 PROCEDURE — 77412 RADIATION TX DELIVERY LVL 3: CPT | Performed by: RADIOLOGY

## 2021-05-03 PROCEDURE — 77387 GUIDANCE FOR RADJ TX DLVR: CPT | Performed by: RADIOLOGY

## 2021-05-03 PROCEDURE — 77336 RADIATION PHYSICS CONSULT: CPT | Performed by: RADIOLOGY

## 2021-05-03 PROCEDURE — G6002 STEREOSCOPIC X-RAY GUIDANCE: HCPCS | Performed by: RADIOLOGY

## 2021-05-04 ENCOUNTER — HOSPITAL ENCOUNTER (OUTPATIENT)
Dept: RADIATION ONCOLOGY | Age: 66
Discharge: HOME | End: 2021-05-04
Attending: RADIOLOGY
Payer: COMMERCIAL

## 2021-05-04 PROCEDURE — 77412 RADIATION TX DELIVERY LVL 3: CPT | Performed by: RADIOLOGY

## 2021-05-04 PROCEDURE — 77280 THER RAD SIMULAJ FIELD SMPL: CPT | Performed by: RADIOLOGY

## 2021-05-05 ENCOUNTER — HOSPITAL ENCOUNTER (OUTPATIENT)
Dept: RADIATION ONCOLOGY | Age: 66
Discharge: HOME | End: 2021-05-05
Attending: RADIOLOGY
Payer: COMMERCIAL

## 2021-05-05 PROCEDURE — G6002 STEREOSCOPIC X-RAY GUIDANCE: HCPCS | Performed by: RADIOLOGY

## 2021-05-05 PROCEDURE — 77387 GUIDANCE FOR RADJ TX DLVR: CPT | Performed by: RADIOLOGY

## 2021-05-05 PROCEDURE — 77412 RADIATION TX DELIVERY LVL 3: CPT | Performed by: RADIOLOGY

## 2021-05-06 ENCOUNTER — HOSPITAL ENCOUNTER (OUTPATIENT)
Dept: RADIATION ONCOLOGY | Age: 66
Discharge: HOME | End: 2021-05-06
Attending: RADIOLOGY
Payer: COMMERCIAL

## 2021-05-06 PROCEDURE — 77387 GUIDANCE FOR RADJ TX DLVR: CPT | Performed by: RADIOLOGY

## 2021-05-06 PROCEDURE — G6002 STEREOSCOPIC X-RAY GUIDANCE: HCPCS | Performed by: RADIOLOGY

## 2021-05-06 PROCEDURE — 77412 RADIATION TX DELIVERY LVL 3: CPT | Performed by: RADIOLOGY

## 2021-05-07 ENCOUNTER — HOSPITAL ENCOUNTER (OUTPATIENT)
Dept: RADIATION ONCOLOGY | Age: 66
Discharge: HOME | End: 2021-05-07
Attending: RADIOLOGY
Payer: COMMERCIAL

## 2021-05-07 PROCEDURE — 77412 RADIATION TX DELIVERY LVL 3: CPT | Performed by: RADIOLOGY

## 2021-05-07 PROCEDURE — 77427 RADIATION TX MANAGEMENT X5: CPT | Performed by: RADIOLOGY

## 2021-05-07 PROCEDURE — 77387 GUIDANCE FOR RADJ TX DLVR: CPT | Performed by: RADIOLOGY

## 2021-05-07 PROCEDURE — G6002 STEREOSCOPIC X-RAY GUIDANCE: HCPCS | Performed by: RADIOLOGY

## 2021-05-12 ENCOUNTER — OFFICE VISIT (OUTPATIENT)
Dept: ONCOLOGY | Age: 66
End: 2021-05-12
Payer: COMMERCIAL

## 2021-05-12 ENCOUNTER — HOSPITAL ENCOUNTER (OUTPATIENT)
Dept: INFUSION THERAPY | Age: 66
Discharge: HOME | End: 2021-05-12
Payer: COMMERCIAL

## 2021-05-12 VITALS
WEIGHT: 153.4 LBS | HEART RATE: 75 BPM | SYSTOLIC BLOOD PRESSURE: 123 MMHG | BODY MASS INDEX: 30.12 KG/M2 | TEMPERATURE: 98.5 F | RESPIRATION RATE: 16 BRPM | HEIGHT: 60 IN | DIASTOLIC BLOOD PRESSURE: 62 MMHG | OXYGEN SATURATION: 100 %

## 2021-05-12 DIAGNOSIS — Z79.811 PROPHYLACTIC USE OF ANASTROZOLE (ARIMIDEX): ICD-10-CM

## 2021-05-12 DIAGNOSIS — Z98.890 STATUS POST RIGHT BREAST LUMPECTOMY: ICD-10-CM

## 2021-05-12 DIAGNOSIS — Z92.3 STATUS POST RADIATION THERAPY WITHIN LAST FOUR WEEKS: ICD-10-CM

## 2021-05-12 DIAGNOSIS — D05.11 DUCTAL CARCINOMA IN SITU (DCIS) OF RIGHT BREAST: Primary | ICD-10-CM

## 2021-05-12 PROCEDURE — 99214 OFFICE O/P EST MOD 30 MIN: CPT | Performed by: INTERNAL MEDICINE

## 2021-05-12 PROCEDURE — 99211 OFF/OP EST MAY X REQ PHY/QHP: CPT

## 2021-05-12 RX ORDER — ANASTROZOLE 1 MG/1
1 TABLET ORAL DAILY
Qty: 30 TABLET | Refills: 3 | Status: SHIPPED | OUTPATIENT
Start: 2021-05-12 | End: 2021-08-30

## 2021-05-12 ASSESSMENT — ENCOUNTER SYMPTOMS
EYE DISCHARGE: 0
BACK PAIN: 0
NAUSEA: 0
CHEST TIGHTNESS: 0
RECTAL PAIN: 0
COUGH: 0
DIARRHEA: 0
COLOR CHANGE: 0
FACIAL SWELLING: 0
VOMITING: 0
SORE THROAT: 0
BLOOD IN STOOL: 0
CONSTIPATION: 0
SHORTNESS OF BREATH: 0
TROUBLE SWALLOWING: 0
ABDOMINAL PAIN: 0
ABDOMINAL DISTENTION: 0
WHEEZING: 0

## 2021-05-12 NOTE — PROGRESS NOTES
breastfeed. Panfilo Grandchild recently, took for 3 to 4 years. No family history of breast or ovarian malignancy. Interim history on 5/12/2021:  The patient completed postlumpectomy radiation treatment last week. She received total cumulative dose of 5256 cGy in 20 fractions. Overall she tolerated radiation treatment well. She presents to the medical oncology clinic to discuss chemoprevention.   HPI   Past Medical History:   Diagnosis Date    Active cochlear Meniere's disease     [facility] ([facility]     Diabetes mellitus ([facility]     type 2    Seasonal allergies     Sleep apnea     Thyroid disease       Past Surgical History:   Procedure Laterality Date    BLADDER SUSPENSION  1990    BREAST LUMPECTOMY Right 2/10/2021    RIGHT BREAST LUMPETOMY, PREOP NEEDLE LOC performed by Arthur Carrillo MD at [facility] LUMPECTOMY Right 3/3/2021    RE-EXCISION MULTIPLE MARGINS, RIGHT BREAST performed by Arthur Carrillo MD at [place] [address]  2006    Dr. Edgardo Whitehead ISAIAS STEROTACTIC LOC BREAST BIOPSY RIGHT Right 01/22/2021    ISAIAS STEROTACTIC LOC BREAST BIOPSY RIGHT 1/22/2021 [address] [place] [address]    US GUIDED NEEDLE LOC OF RIGHT BREAST Right 2/10/2021    US GUIDED NEEDLE LOC OF RIGHT BREAST 2/10/2021 [facility]      Family History   Problem Relation Age of Onset    High Blood Pressure Mother     Heart Disease Mother     Atrial Fibrillation Mother     Cancer Father         leukemia & Lung    Colon Cancer Maternal Grandmother 64      Social History     Tobacco Use    Smoking status: Never Smoker    Smokeless tobacco: Never Used   Substance Use Topics    Alcohol use: No      Current Outpatient Medications   Medication Sig Dispense Refill    anastrozole (ARIMIDEX) 1 MG tablet Take 1 tablet by mouth daily 30 tablet 3    Triamterene-HCTZ (DYAZIDE PO) Take by mouth      Misc Natural Products (CALCIUM PLUS ADVANCED PO) Take by mouth      Multiple Vitamins-Minerals (VITAMIN D3 COMPLETE PO) Take by mouth      METFORMIN HCL PO Take 500 mg by mouth 4 times daily      pravastatin (PRAVACHOL) 20 MG tablet Take 20 mg by mouth daily.  Levothyroxine Sodium 75 MCG CAPS Take  by mouth Daily.  aspirin 81 MG tablet Take 81 mg by mouth daily. No current facility-administered medications for this visit. No Known Allergies   Health Maintenance   Topic Date Due    Hepatitis C screen  Never done    Pneumococcal 0-64 years Vaccine (1 of 2 - PPSV23) Never done    Lipid screen  Never done    COVID-19 Vaccine (1) Never done    HIV screen  Never done    DTaP/Tdap/Td vaccine (1 - Tdap) Never done    Shingles Vaccine (1 of 2) Never done    Colon cancer screen colonoscopy  Never done    Flu vaccine (Season Ended) 09/01/2021    Potassium monitoring  02/01/2022    Creatinine monitoring  02/01/2022    Breast cancer screen  02/10/2022    Cervical cancer screen  12/02/2022    Hepatitis A vaccine  Aged Out    Hib vaccine  Aged Out    Meningococcal (ACWY) vaccine  Aged Out        Subjective:   Review of Systems   Constitutional: Negative for activity change, appetite change, fatigue and fever. HENT: Negative for congestion, dental problem, facial swelling, hearing loss, mouth sores, nosebleeds, sore throat, tinnitus and trouble swallowing. Eyes: Negative for discharge and visual disturbance. Respiratory: Negative for cough, chest tightness, shortness of breath and wheezing. Cardiovascular: Negative for chest pain, palpitations and leg swelling. Gastrointestinal: Negative for abdominal distention, abdominal pain, blood in stool, constipation, diarrhea, nausea, rectal pain and vomiting. Endocrine: Negative for cold intolerance, polydipsia and polyuria. Genitourinary: Negative for decreased urine volume, difficulty urinating, dysuria, flank pain, hematuria and urgency.    Musculoskeletal: Negative for arthralgias, back pain, gait problem, joint swelling, myalgias and neck stiffness. Skin: Negative for color change, rash and wound. Neurological: Negative for dizziness, tremors, seizures, speech difficulty, weakness, light-headedness, numbness and headaches. Hematological: Negative for adenopathy. Does not bruise/bleed easily. Psychiatric/Behavioral: Negative for confusion and sleep disturbance. The patient is not nervous/anxious. Objective:   Physical Exam  Vitals signs reviewed. Constitutional:       General: She is not in acute distress. Appearance: She is well-developed. HENT:      Head: Normocephalic. Mouth/Throat:      Pharynx: No oropharyngeal exudate. Eyes:      General: No scleral icterus. Right eye: No discharge. Left eye: No discharge. Pupils: Pupils are equal, round, and reactive to light. Neck:      Musculoskeletal: Normal range of motion and neck supple. Thyroid: No thyromegaly. Vascular: No JVD. Trachea: No tracheal deviation. Cardiovascular:      Rate and Rhythm: Normal rate. Heart sounds: Normal heart sounds. No murmur. No friction rub. No gallop. Pulmonary:      Effort: Pulmonary effort is normal. No respiratory distress. Breath sounds: Normal breath sounds. No stridor. No wheezing or rales. Chest:      Chest wall: No tenderness. Breasts:         Right: Skin change (s/p lumpectomy. Lumpectomy incision nicely healed.) present. Abdominal:      General: Bowel sounds are normal. There is no distension. Palpations: Abdomen is soft. There is no mass. Tenderness: There is no abdominal tenderness. There is no rebound. Musculoskeletal: Normal range of motion. Comments: Good range of motion in all four extremities. Lymphadenopathy:      Cervical: No cervical adenopathy. Skin:     General: Skin is warm. Findings: No erythema or rash.    Neurological:      Mental Status: She is alert and oriented to person, place, and time. Cranial Nerves: No cranial nerve deficit. Motor: No abnormal muscle tone. Deep Tendon Reflexes: Reflexes are normal and symmetric. Psychiatric:         Behavior: Behavior normal.         Thought Content: Thought content normal.         Judgment: Judgment normal.         /62 (Site: Left Upper Arm, Position: Sitting, Cuff Size: Medium Adult)   Pulse 75   Temp 98.5 °F (36.9 °C) (Oral)   Resp 16   Ht 5' (1.524 m)   Wt 153 lb 6.4 oz (69.6 kg)   SpO2 100%   BMI 29.96 kg/m²      ECOG status is 0    Imaging studies and labs:     Lab Results   Component Value Date    HGB 14.5 02/01/2021    HCT 45.7 02/01/2021       Chemistry        Component Value Date/Time     02/01/2021 1059    K 4.0 02/01/2021 1059     02/01/2021 1059    CO2 28 02/01/2021 1059    BUN 17 02/01/2021 1059    CREATININE 0.9 02/01/2021 1059        Component Value Date/Time    CALCIUM 10.9 (H) 02/01/2021 1059            Assessment/Plan:   1. Right breast DCIS. ER positive, CA positive. Patient is s/p lumpectomy. Radiation therapy is the standard for patients treated with BCT, though it may be reasonable to omit in selected patients with advanced age, extensive comorbidities, or small foci of low-grade disease resected with negative margins. None applies to the patient. She had DCIS larger than 10 mm in size with high histologic grade. Completed radiation treatment last week. She received total cumulative dose of 5250 cGy in 20 fractions. Overall she tolerated radiation treatment well   2. Chemoprevention. Following local treatment, the decision to administer endocrine therapy to reduce the risk of subsequent cancers depends upon the tumor hormone receptor status and the choice of local therapy.   The primary role of systemic treatment is to reduce the risk of invasive breast cancer in the ipsilateral or contralateral breast. The [employer]/NSABP B-35 trial, which enrolled

## 2021-05-14 ENCOUNTER — HOSPITAL ENCOUNTER (OUTPATIENT)
Dept: PHYSICAL THERAPY | Age: 66
Setting detail: THERAPIES SERIES
Discharge: HOME | End: 2021-05-14
Payer: COMMERCIAL

## 2021-05-14 PROCEDURE — 9990000039 HC MT MASSAGE PER 15MIN

## 2021-05-14 NOTE — PROGRESS NOTES
[address] [facility] [address]  MASSAGE THERAPY     5/14/2021     Patient presented today for her initial one hour full body massage.   Patient stated that she is feeling little fatigue at this time but not experiencing any pain.  She  feels her overall wellbeing is very well.  Patient tolerated moderate compression throughout the massage.  Scheduled second massage 5 weeks out.  Tiffanie Pereira LMT

## 2021-05-20 ENCOUNTER — HOSPITAL ENCOUNTER (OUTPATIENT)
Dept: PHYSICAL THERAPY | Age: 66
Setting detail: THERAPIES SERIES
Discharge: HOME | End: 2021-05-20
Payer: COMMERCIAL

## 2021-05-20 PROCEDURE — 97535 SELF CARE MNGMENT TRAINING: CPT

## 2021-05-20 NOTE — DISCHARGE SUMMARY
[facility]  ONCOLOGY REHABILITATION  PHYSICAL THERAPY  [] DAILY NOTE [] PROGRESS NOTE [x] DISCHARGE NOTE    [x] [facility]     Date: 2021  Patient Name:  Kenji Strange  : 1955  MRN: [id number]    Referring Practitioner Ai Jimenez MD with Dr. Magali Purdy to follow   Diagnosis Intraductal carcinoma in situ of right breast [D05.11]    Treatment Diagnosis Postural abnormality, R shoulder stiffness, Z71.9   Date of Evaluation 21    Additional Pertinent History DM II       Functional Outcome Measure Used O: QuickDASH   Functional Outcome Score 2.3% impaired (21)   0% impaired (21)       Insurance: Primary: Payor: R /  /  / ,   Secondary: Alysia Squires Information: No maintenance, aquatics and modalities covered   Visit # 3, 3/10 for progress note   Visits Allowed: 60 visits per calendar year   Recertification Date:    Physician Follow-Up: Radiation thru 21, 21 [place]   Physician Orders: Prehab   History of Present Illness: 21 R DCIS ER/HI+, 2/10/21 R lumpectomy DCIS, radiation 21 - 21        SUBJECTIVE: States skin has been healing well since finishing radiation. Admits worsening fatigue but has since been improving. Denies any tightness in the shoulder. TREATMENT   Precautions:    Pain: 0/10    X in shaded column indicates activity completed today   Modalities Parameters/  Location  Notes         Manual Therapy Time/Technique  Notes   PORi protocol to R upper quadrant 40 minutes  Performed due firm tissue at superior, medial and inferior breast and concern for decreased lymphatic drainage secondary to radiation   Exercise/Intervention   Notes            Brief Fatigue Inventory   Throughout our lives, most of us have times when we feel very tired or fatigued. Have you felt unusually tired or fatigued in the last week? No   Scale: 0 (No Fatigue)  <<<<>>>>  10 (As Bad as You Can Imagine)   1.  How would you rate your fatigue (weariness, tiredness) right NOW? 0   2. How would you rate your USUAL level of fatigue during the past 24 hours? 1   3. How would you rate your WORST level of fatigue during the past 24 hours? 10   Scale: 0 (Does Not Interfere) <<<<>>>> 10 (Completely Interferes)   4. How would you rate how your fatigue has interfered with your GENERAL ACTIVITY in the past 24 hours? 0   5. How would you rate how your fatigue has interfered with your MOOD in the past 24 hours? 0   6. How would you rate how your fatigue has interfered with your WALKING ABILITY in the past 24 hours? 0   7. How would you rate how your fatigue has interfered with your NORMAL WORK (both outside and inside the home) in the past 24 hours? 0   8. How would you rate how your fatigue has interfered with your RELATIONS WITH OTHER PEOPLE in the past 24 hours? 0   9. How would you rate how your fatigue has interfered with your ENJOYMENT OF LIFE in the past 24 hours? 0   Total Score (sum of points for all 9 items/9): 1.2   Levels of Fatigue:  0 = None  1 -3 = Mild  4 - 6 = Moderate  7 - 10 = Severe Mild   *Adapted from MD [address] [place] [address]*         Specific Interventions for Next Treatment:  N/A    Activity Tolerance: Patient tolerated treatment well    ASSESSMENT:  Assessment: Pt has made excellent progress towards goals with improved shoulder mobility and function and improved posture. She has had a mild increase in fatigue due to a \"bad\" earlier in the week but has since returned to no impact on her daily function. Her chest was was examined with excellent healing noted and only mild edema at medial breast. Advised pt to continue with skin care and self MLD. No further skilled PT recommended at this time. GOALS:  Patient Goal: Get through radiation without any problems     Short Term Goals to be met in 12 weeks:  1. See LTGs     Long Term Goals to be met in 12 weeks:   1.  Pt to demo right shoulder PROM flexion improved from 178 deg to 180 deg after radiation for ADLs. GOAL MET:  180 deg. Discontinue Goal    2. Pt to demo right shoulder PROM abduction controlled at 180 deg after radiation for ADLs. GOAL MET:  180 deg. Discontinue Goal    3. Pt to demo posture improved from poor to good with neutral head and neck alignment for ease of ADLs. GOAL MET:  Neutral head, neck and shoulders. Discontinue Goal    4. Pt to demo QuickDASH score returned to 2.3% after radiation for return to previous level of functioning for survivorship. GOAL MET:  0%. Discontinue Goal    5. Pt to demo BFI score returns to baseline after radiation has completed for return to recreational activities. GOAL MET:  1.2 however only due to fatigue flare up a few days ago. Doing excellent now with full return to regular level of activites. .  Discontinue Goal      Patient Education: Progress towards goals, continue with stretches, self MLD 2x per day, and daily walking program. Recommendation of discharge with pt in agreement.   Education Outcome: Verbalized understanding  Education Barriers: None    PLAN: discharge      Time In 0833   Time Out 0850   Timed Code Minutes: 17 min   Total Treatment Time: 17 min       Electronically Signed by: Cosme Hodgson, PT PT, DPT, PIERCE [id number] 5/20/2021

## 2021-06-02 NOTE — PROGRESS NOTES
[facility] [address], [address]        [place],  [facility]        Lj Aguilera: [phone]        F: [phone]       [email or web address]     Date of Service: 2021  Patient ID: Andrea Russo   : 1955  MRN: [id number]   Acct Number: [de-identified]           DIAGNOSIS:   Cancer Staging  Ductal carcinoma in situ (DCIS) of right breast  Staging form: Breast, AJCC 8th Edition  - Pathologic stage from 3/15/2021: Stage 0 (pTis (DCIS), pN0, cM0, G3, ER+, IN+, HER2: Unknown) - Signed by Marvin Groves MD on 3/15/2021      HISTORY OF PRESENT ILLNESS:  Andrea Russo is a 63 y.o. female who underwent screening mammography and had a new cluster of calcifications in the right breast upper outer aspect middle depth.  She had scattered benign calcifications in both breasts.  Diagnostic mammography was performed and revealed clustered punctate calcifications in the right breast upper outer aspect middle depth.  There were no other suspicious abnormalities seen. Branden Lau underwent a stereotactic core biopsy. Pathology revealed ductal carcinoma in situ, intermediate nuclear grade, solid type with central necrosis and associated microcalcifications.  Estrogen receptors were 100% positive progesterone receptors were 5% positive.     She was then seen by surgery for evaluation and treatment recommendations. She was agreeable to proceeding with breast conservation therapy (BCT) and underwent a lumpectomy on 02/10/21 with pathology consistent with high grade DCIS pTisNX with close margins (<2mm). She then underwent a re-resection of disease on 21 with clear margins.     Treatment Tolerance/Response:     Comfort Alteration  Fatigue:Able to perform daily activities with rest periods    Pain Location: n/a  Pain Intensity (Current): 0 No Pain  Pain Treatment: No treatment scheduled  Pain Relief: n/a  Hot Flashes/Flushes: None    Emotional Alteration:   Coping: effective    Nutritional Alteration  Anorexia: none   Nausea: No nausea noted  Vomiting: No vomiting   Dyspepsia/Heartburn: None    Skin Alteration   Skin reaction: Bright erythema    Ventilation Alteration  Cough: None  Hemoptysis: None  Dyspnea: Normal  Mucous Quantity/Quality: n/a    Additional Comments: Using Cetaphil TID and hydrocortisone TID. ECO - Symptomatic but completely ambulatory (Restricted in physically strenuous activity but ambulatory and able to carry out work of a light or sedentary nature. For example, light housework, office work)    Santiago Millery tolerated radiation therapy well with only moderate treatment related symptoms as detailed above. Radiation therapy was completed as planned without any significant treatment breaks or suspensions. Any treatment effects experienced at completion of therapy should improve or resolve in the next 2-3 weeks. Continue symptom management, if required, as instructed on the last day of treatment. Systemic Therapy: None      Follow Up Plan: Patient tolerated radiation therapy well overall with only moderate side effects noted above. Continue regular follow up with all other treating physicians. The patient will return to clinic in 1 month or sooner if clinically indicated. They have our clinic number to call with any questions or concerns if needed. Thank you for allowing us to be a part of their care.     Electronically signed by Marci Ventura MD on 2021 at 1:43 PM  Radiation Oncology    CC:  Dr. Amirah Tate ([facility]) Dr. Jessica Hansen (Surgery)   [facility]: Tumor Registry: [place]

## 2021-06-14 ENCOUNTER — HOSPITAL ENCOUNTER (OUTPATIENT)
Dept: RADIATION ONCOLOGY | Age: 66
Discharge: HOME | End: 2021-06-14
Attending: RADIOLOGY
Payer: COMMERCIAL

## 2021-06-14 VITALS
SYSTOLIC BLOOD PRESSURE: 118 MMHG | DIASTOLIC BLOOD PRESSURE: 74 MMHG | WEIGHT: 153.2 LBS | BODY MASS INDEX: 29.92 KG/M2 | TEMPERATURE: 98.3 F | HEART RATE: 87 BPM | RESPIRATION RATE: 18 BRPM | OXYGEN SATURATION: 99 %

## 2021-06-14 DIAGNOSIS — D05.11 DUCTAL CARCINOMA IN SITU (DCIS) OF RIGHT BREAST: Primary | ICD-10-CM

## 2021-06-14 DIAGNOSIS — Z78.0 POSTMENOPAUSAL: ICD-10-CM

## 2021-06-14 DIAGNOSIS — Z85.3 PERSONAL HISTORY OF MALIGNANT NEOPLASM OF BREAST: ICD-10-CM

## 2021-06-14 PROCEDURE — 99215 OFFICE O/P EST HI 40 MIN: CPT | Performed by: NURSE PRACTITIONER

## 2021-06-14 PROCEDURE — 99212 OFFICE O/P EST SF 10 MIN: CPT | Performed by: NURSE PRACTITIONER

## 2021-06-14 NOTE — PROGRESS NOTES
[address] [place] [address]  [facility] [address]  Phone: [phone]   Toll Free: [phone]   Fax: [phone]    RADIATION ONCOLOGY FOLLOW UP REPORT    PATIENT NAME:  Chase Bonilla     : 1955  MEDICAL RECORD NO: [id number]    LOCATION: [facility] NO: [id number]      PROVIDER: KATHY Daigle CNP        DATE OF SERVICE: 2021    FOLLOW UP PHYSICIANS: Dr. Yon Claudio; Dr. Romayne Ruder    DIAGNOSIS: D05.11 intraductal carcinoma in situ of right breast.  Tis (DCIS) NXM0 stage 0 ER positive VT positive    DATE OF DIAGNOSIS: 2021    END OF TREATMENT DATE: 2021    ECOG PERFORMANCE STATUS: 0    PAIN: Denies    CHAPERONE: Declined      HPI:  Katherine Tran underwent screening mammography and had a new cluster of calcifications in the right breast upper outer aspect middle depth.  She had scattered benign calcifications in both breasts.  Diagnostic mammography was performed and revealed clustered punctate calcifications in the right breast upper outer aspect middle depth.  There were no other suspicious abnormalities seen. Gretchen Bobo underwent a stereotactic core biopsy. Pathology revealed ductal carcinoma in situ, intermediate nuclear grade, solid type with central necrosis and associated microcalcifications. Estrogen receptors were 100% positive progesterone receptors were 5% positive.     She was then seen by surgery for evaluation and treatment recommendations. She was agreeable to proceeding with breast conservation therapy and underwent a lumpectomy on 02/10/21 with pathology consistent with high grade DCIS pTisNX with close margins (<2mm). She then underwent a re-resection of disease on 21 with clear margins. INTERVAL HISTORY: Katherine Tran returns to [address] [place] [address] for a post treatment follow up after undergoing surgery and radiation for right breast cancer.  Katherine Tran admits to occasional shooting pains though the breast otherwise denies palpable cervical adenopathy. LYMPH: Without palpable supraclavicular axillary adenopathy. LUNGS: Clear without adventitious sounds. Respirations easy and unlabored. HEART: Regular rate and rhythm. Without murmur. BREASTS: Left breast is soft, nontender. There are no suspicious masses, lumps or architectural distortions. Nipple is everted without discharge. Right breast is soft, nontender with palpation. There are no suspicious masses or lumps. Surgical incision site well-healed without signs of infection. There is no residual hyperpigmentation from radiation skin is smooth. Nipples everted without discharge. ABDOMEN: Soft, nontender, nondistended. Active bowel sounds x4. EXTREMITIES: Without clubbing or cyanosis. No edema noted. Full range of motion to bilateral upper extremities. NEUROLOGIC EXAMINATION: Cranial nerves grossly intact. ASSESSMENT: Wylie Dakin was diagnosed with hormone responsive DCIS right breast cancer in January 2021. She underwent treatment with surgery which was followed by radiation treatment and started on adjuvant Arimidex June 1, 2021. Wylie Dakin has recovered well from radiation treatment. She started Arimidex and is tolerating it well with mild hot flashes. She will be due for mammogram and repeat bone density scan in January. Wylie Dakin does not have any concerning findings on today's physical examination. PLAN:  1. Mammogram due January 2022: Order placed. 2. Arimidex: Experiencing mild hot flashes otherwise tolerating well. 3. DEXA: Due January 2022. Order placed. 4. Continue follow-up with other providers as scheduled. 5. Follow-up here in December  6. Wylie Dakin is aware she can call for any questions, concerns or changes in condition.     Electronically signed by KATHY Contreras CNP on 6/14/21 at 11:07 AM EDT    ATTESTATION:  I have spent 45 minutes reviewing previous notes, test results and face to face with the patient discussing the diagnosis and importance of compliance with the treatment plan as well as documenting on the day of the visit.     CC: [facility] Juan [facility] Dr. Loki Rebolledo (Surgery)

## 2021-06-25 ENCOUNTER — HOSPITAL ENCOUNTER (OUTPATIENT)
Dept: PHYSICAL THERAPY | Age: 66
Setting detail: THERAPIES SERIES
Discharge: HOME | End: 2021-06-25

## 2021-06-25 PROCEDURE — 9990000039 HC MT MASSAGE PER 15MIN

## 2021-08-06 ENCOUNTER — HOSPITAL ENCOUNTER (OUTPATIENT)
Dept: PHYSICAL THERAPY | Age: 66
Setting detail: THERAPIES SERIES
Discharge: HOME | End: 2021-08-06
Payer: COMMERCIAL

## 2021-08-06 PROCEDURE — 9990000039 HC MT MASSAGE PER 15MIN

## 2021-08-06 NOTE — PROGRESS NOTES
[address] [facility]      8/6/2021      Patient presents today for one hour full body massage. Patient stated she is feeling great and has no complaints today. Patient stated overall well being is wonderful. Patient tolerated moderate compression throughout the massage. Scheduled fourth massage 8 weeks out and will be self pay.       [place], [place]  [phone]

## 2021-08-30 RX ORDER — ANASTROZOLE 1 MG/1
TABLET ORAL
Qty: 90 TABLET | Refills: 1 | Status: SHIPPED | OUTPATIENT
Start: 2021-08-30 | End: 2022-02-21

## 2021-09-24 ENCOUNTER — HOSPITAL ENCOUNTER (OUTPATIENT)
Dept: INFUSION THERAPY | Age: 66
Discharge: HOME | End: 2021-09-24
Payer: COMMERCIAL

## 2021-09-24 ENCOUNTER — OFFICE VISIT (OUTPATIENT)
Dept: ONCOLOGY | Age: 66
End: 2021-09-24
Payer: COMMERCIAL

## 2021-09-24 VITALS
WEIGHT: 155.4 LBS | HEIGHT: 60 IN | DIASTOLIC BLOOD PRESSURE: 67 MMHG | SYSTOLIC BLOOD PRESSURE: 137 MMHG | BODY MASS INDEX: 30.51 KG/M2 | OXYGEN SATURATION: 98 % | TEMPERATURE: 98.7 F | RESPIRATION RATE: 16 BRPM | HEART RATE: 78 BPM

## 2021-09-24 DIAGNOSIS — Z92.3 STATUS POST RADIATION THERAPY WITHIN LAST FOUR WEEKS: ICD-10-CM

## 2021-09-24 DIAGNOSIS — Z79.811 PROPHYLACTIC USE OF ANASTROZOLE (ARIMIDEX): ICD-10-CM

## 2021-09-24 DIAGNOSIS — Z98.890 STATUS POST RIGHT BREAST LUMPECTOMY: ICD-10-CM

## 2021-09-24 DIAGNOSIS — D05.11 DUCTAL CARCINOMA IN SITU (DCIS) OF RIGHT BREAST: Primary | ICD-10-CM

## 2021-09-24 PROCEDURE — 99211 OFF/OP EST MAY X REQ PHY/QHP: CPT

## 2021-09-24 PROCEDURE — 99214 OFFICE O/P EST MOD 30 MIN: CPT | Performed by: INTERNAL MEDICINE

## 2021-09-24 ASSESSMENT — ENCOUNTER SYMPTOMS
ABDOMINAL DISTENTION: 0
TROUBLE SWALLOWING: 0
EYE DISCHARGE: 0
BACK PAIN: 0
COUGH: 0
VOMITING: 0
BLOOD IN STOOL: 0
NAUSEA: 0
DIARRHEA: 0
SHORTNESS OF BREATH: 0
CONSTIPATION: 0
COLOR CHANGE: 0
RECTAL PAIN: 0
CHEST TIGHTNESS: 0
FACIAL SWELLING: 0
ABDOMINAL PAIN: 0
SORE THROAT: 0
WHEEZING: 0

## 2021-09-24 NOTE — PROGRESS NOTES
[facility] [address] [facility] [address], [address] [facility] [address]  Darinrajesh Joe  Dept: [phone]  Dept Fax: [phone]: [phone]    Visit Date:2021     Jason Sorto is a 61 y.o. female who presents today for:   Chief Complaint   Patient presents with    Follow-up     Ductal carcinoma in situ (DCIS) of right breast        HPI: This is a 28-year-old patient with h/o DCIS of the right breast.    She underwent screening mammography on 2021 it showed a new cluster of calcifications in the right breast upper outer aspect middle depth.  Diagnostic mammography revealed clustered punctate calcifications in the right breast upper outer aspect middle depth.  There were no other suspicious abnormalities seen.  She underwent a stereotactic core biopsy on 2021. Pathology revealed ductal carcinoma in situ, intermediate nuclear grade, solid type with central necrosis and associated microcalcifications.  Estrogen receptors 100% positive, progesterone receptor 5% positive. Subsequently she met with the surgeon to discuss her surgical treatment options. She decided to proceed with breast conservation therapy. The patient underwent lumpectomy on February 10, 2021. Final pathology report showed:  A. Breast, right, lumpectomy:       Ductal carcinoma in situ, nuclear grade 3, solid and cribriform       types, with comedonecrosis and microcalcifications, pTis (DCIS) NX.    See microscopic for margin status.    Changes consistent with previous biopsy site. B. Breast, right, new skin caudal margin, excision:    Fibrocystic changes including dense fibrosis and microcyst formation.    Fat necrosis.    Negative for malignancy. Due to very close/positive margin the patient underwent reexcision on March 3, 2021. Showed residual and DCIS. New margins were negative. Postsurgical course is unremarkable. Menarche 17. Menopause 55. . Had children in 20's. Did not breastfeed.  Stopped estroven recently, took for 3 to 4 years. No family history of breast or ovarian malignancy. She received total cumulative dose of 5256 cGy in 20 fractions. In May 2021 she started adjuvant hormonal treatment    Interim history on 9/24/2021:  Patient reports that she tolerates Arimidex well. She denies having any hot flashes minimal arthralgia. The patient denies any complaints related to her breasts. She denies having any new complaints today. No recent hospitalizations.   HPI   Past Medical History:   Diagnosis Date    Active cochlear Meniere's disease     [facility]     Diabetes mellitus ([facility] [facility]     type 2    Seasonal allergies     Sleep apnea     Thyroid disease       Past Surgical History:   Procedure Laterality Date    BLADDER SUSPENSION  1990    BREAST LUMPECTOMY Right 2/10/2021    RIGHT BREAST LUMPETOMY, PREOP NEEDLE LOC performed by Rolan Perry MD at [address] [facility] LUMPECTOMY Right 3/3/2021    RE-EXCISION MULTIPLE MARGINS, RIGHT BREAST performed by Rolan Perry MD at [place] [address]  2006    Dr. Kierra Castillo ISAIAS STEROTACTIC LOC BREAST BIOPSY RIGHT Right 01/22/2021    ISAIAS STEROTACTIC LOC BREAST BIOPSY RIGHT 1/22/2021 [address] [place] [address]    US GUIDED NEEDLE LOC OF RIGHT BREAST Right 2/10/2021    US GUIDED NEEDLE LOC OF RIGHT BREAST 2/10/2021 Andrea [facility]      Family History   Problem Relation Age of Onset    High Blood Pressure Mother     Heart Disease Mother     Atrial Fibrillation Mother     Cancer Father         leukemia & Lung    Colon Cancer Maternal Grandmother 64      Social History     Tobacco Use    Smoking status: Never Smoker    Smokeless tobacco: Never Used   Substance Use Topics    Alcohol use: No      Current Outpatient Medications   Medication Sig Dispense Refill    anastrozole (ARIMIDEX) 1 MG tablet TAKE 1 TABLET BY MOUTH EVERY DAY 90 tablet 1    Triamterene-HCTZ (DYAZIDE PO) Take by mouth      Misc Natural Products (CALCIUM PLUS ADVANCED PO) Take by mouth      Multiple Vitamins-Minerals (VITAMIN D3 COMPLETE PO) Take by mouth      METFORMIN HCL PO Take 500 mg by mouth 4 times daily      pravastatin (PRAVACHOL) 20 MG tablet Take 20 mg by mouth daily.  Levothyroxine Sodium 75 MCG CAPS Take  by mouth Daily.  aspirin 81 MG tablet Take 81 mg by mouth daily. No current facility-administered medications for this visit. No Known Allergies   Health Maintenance   Topic Date Due    Hepatitis C screen  Never done    Lipid screen  Never done    HIV screen  Never done    DTaP/Tdap/Td vaccine (1 - Tdap) Never done    Colon cancer screen colonoscopy  Never done    Shingles Vaccine (1 of 2) Never done    Pneumococcal 0-64 years Vaccine (2 of 4 - PPSV23) 01/14/2019    COVID-19 Vaccine (3 - Pfizer risk 3-dose series) 05/04/2021    Flu vaccine (1) Never done    Potassium monitoring  02/01/2022    Creatinine monitoring  02/01/2022    Breast cancer screen  02/10/2022    Cervical cancer screen  12/02/2022    Hepatitis A vaccine  Aged Out    Hib vaccine  Aged Out    Meningococcal (ACWY) vaccine  Aged Out        Subjective:   Review of Systems   Constitutional: Negative for activity change, appetite change, fatigue and fever. HENT: Negative for congestion, dental problem, facial swelling, hearing loss, mouth sores, nosebleeds, sore throat, tinnitus and trouble swallowing. Eyes: Negative for discharge and visual disturbance. Respiratory: Negative for cough, chest tightness, shortness of breath and wheezing. Cardiovascular: Negative for chest pain, palpitations and leg swelling. Gastrointestinal: Negative for abdominal distention, abdominal pain, blood in stool, constipation, diarrhea, nausea, rectal pain and vomiting. Endocrine: Negative for cold intolerance, polydipsia and polyuria.    Genitourinary: Negative for decreased urine volume, difficulty urinating, dysuria, flank pain, hematuria and urgency. Musculoskeletal: Negative for arthralgias, back pain, gait problem, joint swelling, myalgias and neck stiffness. Skin: Negative for color change, rash and wound. Neurological: Negative for dizziness, tremors, seizures, speech difficulty, weakness, light-headedness, numbness and headaches. Hematological: Negative for adenopathy. Does not bruise/bleed easily. Psychiatric/Behavioral: Negative for confusion and sleep disturbance. The patient is not nervous/anxious. Objective:   Physical Exam  Vitals reviewed. Constitutional:       General: She is not in acute distress. Appearance: She is well-developed. HENT:      Head: Normocephalic. Mouth/Throat:      Pharynx: No oropharyngeal exudate. Eyes:      General: No scleral icterus. Right eye: No discharge. Left eye: No discharge. Pupils: Pupils are equal, round, and reactive to light. Neck:      Thyroid: No thyromegaly. Vascular: No JVD. Trachea: No tracheal deviation. Cardiovascular:      Rate and Rhythm: Normal rate. Heart sounds: Normal heart sounds. No murmur heard. No friction rub. No gallop. Pulmonary:      Effort: Pulmonary effort is normal. No respiratory distress. Breath sounds: Normal breath sounds. No stridor. No wheezing or rales. Chest:      Chest wall: No tenderness. Breasts:         Right: Skin change (s/p lumpectomy. Lumpectomy incision nicely healed.) present. Abdominal:      General: Bowel sounds are normal. There is no distension. Palpations: Abdomen is soft. There is no mass. Tenderness: There is no abdominal tenderness. There is no rebound. Musculoskeletal:         General: Normal range of motion. Cervical back: Normal range of motion and neck supple. Comments: Good range of motion in all four extremities. Lymphadenopathy:      Cervical: No cervical adenopathy. Skin:     General: Skin is warm. Findings: No erythema or rash. Neurological:      Mental Status: She is alert and oriented to person, place, and time. Cranial Nerves: No cranial nerve deficit. Motor: No abnormal muscle tone. Deep Tendon Reflexes: Reflexes are normal and symmetric. Psychiatric:         Behavior: Behavior normal.         Thought Content: Thought content normal.         Judgment: Judgment normal.         /67 (Site: Left Upper Arm, Position: Sitting, Cuff Size: Medium Adult)   Pulse 78   Temp 98.7 °F (37.1 °C) (Oral)   Resp 16   Ht 5' (1.524 m)   Wt 155 lb 6.4 oz (70.5 kg)   SpO2 98%   BMI 30.35 kg/m²      ECOG status is 0    Imaging studies and labs:     Lab Results   Component Value Date    HGB 14.5 02/01/2021    HCT 45.7 02/01/2021       Chemistry        Component Value Date/Time     02/01/2021 1059    K 4.0 02/01/2021 1059     02/01/2021 1059    CO2 28 02/01/2021 1059    BUN 17 02/01/2021 1059    CREATININE 0.9 02/01/2021 1059        Component Value Date/Time    CALCIUM 10.9 (H) 02/01/2021 1059            Assessment/Plan:   1. Right breast DCIS. ER positive, DC positive. Patient is s/p lumpectomy. Radiation therapy is the standard for patients treated with BCT, though it may be reasonable to omit in selected patients with advanced age, extensive comorbidities, or small foci of low-grade disease resected with negative margins. None applies to the patient. She had DCIS larger than 10 mm in size with high histologic grade. Completed radiation treatment, she received total cumulative dose of 5250 cGy in 20 fractions. 2.  Chemoprevention. Following local treatment, the decision to administer endocrine therapy to reduce the risk of subsequent cancers depends upon the tumor hormone receptor status and the choice of local therapy.   The primary role of systemic treatment is to reduce the risk of invasive breast cancer in the ipsilateral or contralateral breast. The [employer]/NSABP B-35 trial, which enrolled over 3100 postmenopausal women with hormone receptor-positive DCIS who underwent BCT, demonstrated that Arimidex resulted in a decreased rate of breast cancer events at 10 years compared with tamoxifen. Toxicities associated with aromatase inhibitors include loss of bone density, fractures, and cardiovascular risk. After discussion the patient decided to proceed with chemoprevention. She tolerates Arimidex well. She denies having any arthralgia no hot flashes. Her breast physical examination is unremarkable. The patient was instructed to continue Arimidex. On return to clinic visit she will have survivorship visit. Diagnosis Orders   1. Ductal carcinoma in situ (DCIS) of right breast     2. Status post right breast lumpectomy     3. Status post radiation therapy within last four weeks     4. Prophylactic use of anastrozole (Arimidex)          Plan:   No follow-ups on file. Orders Placed:   No orders of the defined types were placed in this encounter. Medications Prescribed:   No orders of the defined types were placed in this encounter.

## 2021-09-30 ENCOUNTER — OFFICE VISIT (OUTPATIENT)
Dept: SURGERY | Age: 66
End: 2021-09-30
Payer: COMMERCIAL

## 2021-09-30 VITALS
WEIGHT: 154 LBS | DIASTOLIC BLOOD PRESSURE: 71 MMHG | RESPIRATION RATE: 14 BRPM | HEART RATE: 79 BPM | OXYGEN SATURATION: 99 % | TEMPERATURE: 97 F | BODY MASS INDEX: 30.23 KG/M2 | SYSTOLIC BLOOD PRESSURE: 121 MMHG | HEIGHT: 60 IN

## 2021-09-30 DIAGNOSIS — E11.9 TYPE 2 DIABETES MELLITUS WITHOUT COMPLICATION, WITHOUT LONG-TERM CURRENT USE OF INSULIN (HCC): ICD-10-CM

## 2021-09-30 DIAGNOSIS — D05.11 DUCTAL CARCINOMA IN SITU (DCIS) OF RIGHT BREAST: Primary | ICD-10-CM

## 2021-09-30 PROCEDURE — 99213 OFFICE O/P EST LOW 20 MIN: CPT | Performed by: SURGERY

## 2021-09-30 NOTE — PROGRESS NOTES
Kathya Davis MD   General Surgery  Follow up Patient Evaluation in Office  Pt Name: Evone Sandifer  Date of Birth 1955   Today's Date: 9/30/2021  Medical Record Number: [id number]  Referring Provider: No ref. provider found  Primary Care Provider: Man SWEET  Chief Complaint:  Chief Complaint   Patient presents with    6 Month Follow-Up     Ductal carcinoma in situ (DCIS) of right breast        ASSESSMENT      1. Ductal carcinoma in situ (DCIS) of right breast    2. Type 2 diabetes mellitus without complication, without long-term current use of insulin (HCC)       Stage 0   PLANS      1. Clinical breast examination benign  2. Breast cancer survivorship plan reviewed  3. Continue anastrozole under guidance of medical oncology  4. Future mammography and DEXA scan ordered  5. Encourage self breast examinations call for any changes  6. Follow-up surgical clinic in 6 months      NAJMA Jay is a 61y.o. year old female who is presenting today in the office for follow-up of ductal carcinoma in situ of the right breast.   Richard Arroyo was diagnosed with ductal carcinoma in situ of the right breast after she had abnormal screening mammogram in February 2021. She had a new cluster of calcifications in the upper outer aspect middle depth. Diagnostic imaging and stereotactic core biopsy was performed. Pathology revealed ductal carcinoma in situ intermediate nuclear grade with some central necrosis. Estrogen receptor receptors were 100% positive, progesterone receptors were 5% positive. She opted for breast conservation therapy and underwent a right partial mastectomy with preoperative localization. On excision she had residual ductal carcinoma in situ DCIS was 1 mm deep and lateral margin. She underwent a course of external beam radiation therapy after reresection. She finished radiation on May 7. She started Arimidex which she is tolerating well she denies significant side effects.   She has some tolerable hot flashes. She denies any breast pain or nipple discharge         Menarche 16. Menopause 55. . Had children in 20's. Did not breastfeed. Stopped estroven, took for 3 to 4 years. No family history of breast or ovarian malignancy. Past Medical History  Past Medical History:   Diagnosis Date    Active cochlear Meniere's disease     [facility]     Diabetes mellitus ([facility] [address] [facility]     type 2    Seasonal allergies     Sleep apnea     Thyroid disease        Past Surgical History  Past Surgical History:   Procedure Laterality Date    BLADDER SUSPENSION      BREAST LUMPECTOMY Right 2/10/2021    RIGHT BREAST LUMPETOMY, PREOP NEEDLE LOC performed by Aaliyah Dalal MD at [address] [facility] LUMPECTOMY Right 3/3/2021    RE-EXCISION MULTIPLE MARGINS, RIGHT BREAST performed by Aaliyah Dalal MD at [place] [address]      Dr. Maria De Jesus Hendricks ISAIAS STEROTACTIC LOC BREAST BIOPSY RIGHT Right 2021    ISAIAS STEROTACTIC LOC BREAST BIOPSY RIGHT 2021 [facility]    US GUIDED NEEDLE LOC OF RIGHT BREAST Right 2/10/2021    US GUIDED NEEDLE LOC OF RIGHT BREAST 2/10/2021 Suleiman Bui MD [facility]       Medications  Current Outpatient Medications   Medication Sig Dispense Refill    anastrozole (ARIMIDEX) 1 MG tablet TAKE 1 TABLET BY MOUTH EVERY DAY 90 tablet 1    Triamterene-HCTZ (DYAZIDE PO) Take by mouth      Misc Natural Products (CALCIUM PLUS ADVANCED PO) Take by mouth      Multiple Vitamins-Minerals (VITAMIN D3 COMPLETE PO) Take by mouth      METFORMIN HCL PO Take 500 mg by mouth 4 times daily      pravastatin (PRAVACHOL) 20 MG tablet Take 20 mg by mouth daily.  Levothyroxine Sodium 75 MCG CAPS Take  by mouth Daily.  aspirin 81 MG tablet Take 81 mg by mouth daily. No current facility-administered medications for this visit.      Allergies   No Known Allergies    Family History  Family History Problem Relation Age of Onset    High Blood Pressure Mother     Heart Disease Mother     Atrial Fibrillation Mother     Cancer Father         leukemia & Lung    Colon Cancer Maternal Grandmother 64       SocialHistory  Social History     Socioeconomic History    Marital status:      Spouse name: Gene    Number of children: 3    Years of education: Not on file    Highest education level: Not on file   Occupational History    Not on file   Tobacco Use    Smoking status: Never Smoker    Smokeless tobacco: Never Used   Vaping Use    Vaping Use: Never used   Substance and Sexual Activity    Alcohol use: No    Drug use: No    Sexual activity: Yes     Partners: Male     Birth control/protection: Post-menopausal   Other Topics Concern    Not on file   Social History Narrative    Not on file     Social Determinants of Health     Financial Resource Strain:     Difficulty of Paying Living Expenses:    Food Insecurity:     Worried About Running Out of Food in the Last Year:     Ran Out of Food in the Last Year:    Transportation Needs:     Lack of Transportation (Medical):  Lack of Transportation (Non-Medical):    Physical Activity:     Days of Exercise per Week:     Minutes of Exercise per Session:    Stress:     Feeling of Stress :    Social Connections:     Frequency of Communication with Friends and Family:     Frequency of Social Gatherings with Friends and Family:     Attends Christianity Services:     Active Member of Clubs or Organizations:     Attends Club or Organization Meetings:     Marital Status:    Intimate Partner Violence:     Fear of Current or Ex-Partner:     Emotionally Abused:     Physically Abused:     Sexually Abused:            Review of Systems  Review of Systems   Constitutional: Negative for chills, fatigue, fever and unexpected weight change. HENT: Negative for congestion and sore throat. Eyes: Negative for visual disturbance.    Respiratory: Negative for cough, shortness of breath and wheezing. Cardiovascular: Negative for chest pain and palpitations. Gastrointestinal: Negative for abdominal pain, blood in stool, nausea and vomiting. Endocrine: Negative for cold intolerance, heat intolerance and polydipsia. Genitourinary: Negative for dysuria, flank pain and hematuria. Musculoskeletal: Negative for gait problem, joint swelling and myalgias. Skin: Negative for color change and rash. Allergic/Immunologic: Negative for immunocompromised state. Neurological: Negative for dizziness, tremors, seizures and speech difficulty. Hematological: Does not bruise/bleed easily. Psychiatric/Behavioral: Negative for behavioral problems, confusion and suicidal ideas. OBJECTIVE     /71 (Site: Right Upper Arm, Position: Sitting, Cuff Size: Medium Adult)   Pulse 79   Temp 97 °F (36.1 °C) (Tympanic)   Resp 14   Ht 5' (1.524 m)   Wt 154 lb (69.9 kg)   SpO2 99%   BMI 30.08 kg/m²      Physical Exam  Constitutional:       Appearance: She is well-developed. HENT:      Head: Normocephalic and atraumatic. Eyes:      General: No scleral icterus. Pupils: Pupils are equal, round, and reactive to light. Neck:      Vascular: No JVD. Trachea: No tracheal deviation. Cardiovascular:      Rate and Rhythm: Normal rate. Heart sounds: Normal heart sounds. Pulmonary:      Effort: Pulmonary effort is normal. No respiratory distress. Breath sounds: No wheezing. Chest:      Chest wall: No tenderness. Breasts:         Right: No inverted nipple, mass, nipple discharge, skin change or tenderness. Left: No inverted nipple, mass, nipple discharge, skin change or tenderness. Abdominal:      General: There is no distension. Palpations: There is no mass. Tenderness: There is no abdominal tenderness. Musculoskeletal:         General: No deformity. Lymphadenopathy:      Cervical: No cervical adenopathy.       Right cervical: No superficial, deep or posterior cervical adenopathy. Left cervical: No superficial, deep or posterior cervical adenopathy. Upper Body:      Right upper body: No supraclavicular, axillary or pectoral adenopathy. Left upper body: No supraclavicular, axillary or pectoral adenopathy. Skin:     General: Skin is warm and dry. Coloration: Skin is not jaundiced or pale. Findings: No rash. Neurological:      General: No focal deficit present. Mental Status: She is alert and oriented to person, place, and time. Cranial Nerves: No cranial nerve deficit. Psychiatric:         Mood and Affect: Mood normal.         Behavior: Behavior normal.         Thought Content:  Thought content normal.         Lab Results   Component Value Date    HGB 14.5 02/01/2021    HCT 45.7 02/01/2021     02/01/2021    K 4.0 02/01/2021     02/01/2021    CREATININE 0.9 02/01/2021    BUN 17 02/01/2021    CO2 28 02/01/2021

## 2021-10-01 ENCOUNTER — HOSPITAL ENCOUNTER (OUTPATIENT)
Dept: PHYSICAL THERAPY | Age: 66
Setting detail: THERAPIES SERIES
Discharge: HOME | End: 2021-10-01

## 2021-10-01 PROCEDURE — 9990000039 HC MT MASSAGE PER 15MIN

## 2021-10-01 NOTE — PROGRESS NOTES
[address] [facility]      10/1/2021       Patient presents today for one hour full body massage. Patient stated she is feeling good and has no complaints today. Patient stated overall well being is great. Patient tolerated moderate compression throughout the massage. Scheduled fifth massage 6 weeks out and will be self pay.       [place], [place]  [phone]

## 2021-10-03 ASSESSMENT — ENCOUNTER SYMPTOMS
NAUSEA: 0
BLOOD IN STOOL: 0
COLOR CHANGE: 0
SORE THROAT: 0
WHEEZING: 0
COUGH: 0
VOMITING: 0
ABDOMINAL PAIN: 0
SHORTNESS OF BREATH: 0

## 2021-11-12 ENCOUNTER — HOSPITAL ENCOUNTER (OUTPATIENT)
Dept: PHYSICAL THERAPY | Age: 66
Setting detail: THERAPIES SERIES
Discharge: HOME | End: 2021-11-12

## 2021-11-12 PROCEDURE — 9990000039 HC MT MASSAGE PER 15MIN

## 2021-11-12 NOTE — PROGRESS NOTES
[facility] [facility]      11/12/20212      Patient presents today for one hour full body massage. Patient's chief complaint today is some tiredness but otherwise stated she is feeling well. Patient stated overall well being is great. Patient tolerated moderate compression throughout the massage. Scheduled next massage 8 weeks out and will be self pay.       [place], [place]  [phone]

## 2022-01-04 ENCOUNTER — OFFICE VISIT (OUTPATIENT)
Dept: ONCOLOGY | Age: 67
End: 2022-01-04
Payer: COMMERCIAL

## 2022-01-04 ENCOUNTER — HOSPITAL ENCOUNTER (OUTPATIENT)
Dept: INFUSION THERAPY | Age: 67
Discharge: HOME | End: 2022-01-04
Payer: COMMERCIAL

## 2022-01-04 VITALS
OXYGEN SATURATION: 100 % | TEMPERATURE: 98.8 F | WEIGHT: 150.8 LBS | DIASTOLIC BLOOD PRESSURE: 72 MMHG | RESPIRATION RATE: 16 BRPM | HEIGHT: 60 IN | HEART RATE: 96 BPM | SYSTOLIC BLOOD PRESSURE: 146 MMHG | BODY MASS INDEX: 29.61 KG/M2

## 2022-01-04 DIAGNOSIS — Z79.811 PROPHYLACTIC USE OF ANASTROZOLE (ARIMIDEX): ICD-10-CM

## 2022-01-04 DIAGNOSIS — D05.11 DUCTAL CARCINOMA IN SITU (DCIS) OF RIGHT BREAST: Primary | ICD-10-CM

## 2022-01-04 PROCEDURE — 99211 OFF/OP EST MAY X REQ PHY/QHP: CPT

## 2022-01-04 PROCEDURE — 99215 OFFICE O/P EST HI 40 MIN: CPT | Performed by: NURSE PRACTITIONER

## 2022-01-04 NOTE — PROGRESS NOTES
[facility], [address] [address],[address]  [address] [address]  Dept: [phone]  Dept Fax: [phone]: [phone]      Visit Date:1/4/2022     Charline Branch is a 61 y.o. female who presents today for:   Survivorship Visit    HPI:   Charline Branch is a 61 y.o. female with history of breast cancer. The patient has completed treatment at [facility]. Her cancer team includes:  General Surgery: Dr. Ira Cheek Oncology: Dr. Linda Antonio  Radiation Oncology: Dr. Cyndee Carvalho      Treatment history includes:   She underwent screening mammogram 1/4/2021 with findings of new cluster of calcifications in upper outer aspect of the right breast.  Diagnostic mammography (+) clustered punctuate calcifications in right upper outer aspect of the right breast.  No suspicious abnormalities seen. Stereotactic core biopsy 1/22/2021 (+) ductal carcinoma in situ, intermediate nuclear grade, solid type with central necrosis and associated microcalcifications. ER (+) 100%. ID (+) 5%. She met with surgeon to discuss surgical treatment options and decided to proceed with breast conservation therapy. S/p lumpectomy on 2/10/2021 (+) ductal carcinoma in situ, nuclear grade 3, solid and cribriform types, with comedonecrosis and microcalcifications, pTis (DCIS) NX. Due to very close/positive margin, pt underwent re-excision 3/3/2021 with findings of residual and DCIS; new margins negative. Menarche 17. Menopause 55. She delivered her children in her 25s. She did not breastfeed. She stopped estroven recently after 3-4 year course. No family history of breast or ovarian malignancy. She received total cumulative dose of 5256 cGy in 20 fractions. She started adjuvant hormonal treatment in May 2021 with Arimidex. Interval History 1/4/2022: Today, the patient presents for Survivorship visit. Her treatment history is summarized above.  She continues regular follow up with her cancer team. She states she has felt well since her last visit with Dr. Naga Monae on 5/12/2021. No ED visits or hospitalizations since last visit in our office. She denies any concerns with her breasts, no palpable masses, nipple inversion or discharge. She denies unintentional weight loss, poor appetite, early satiety, new shortness of breath or bone pain. She denies any concerns related to lymphedema; no chest wall or right upper extremity swelling. Any side effects from AI: no.  She reports continued night sweats and mild joint pain; tolerable. She denies hot flashes, vaginal dryness, incontinence, new bone pain. She stopped magnesium due to acid reflux. Has any family history changed since last visit? no      PMH, SH, and FH:  I reviewed the patients medication list and allergy list as noted on the electronic medical record. The PMH, SH and FH were also reviewed as noted on the EMR. Review of Systems:   Review of Systems   Pertinent review of systems noted in HPI, all other ROS negative. Objective:   Physical Exam   There were no vitals taken for this visit. General appearance: No apparent distress, calm and cooperative. HEENT: Pupils equal, round, and reactive to light. Conjunctivae/corneas clear. Oral mucosa moist.  Neck: Supple, with full range of motion. Trachea midline. Respiratory:  Normal respiratory effort. Clear to auscultation all lung fields. Cardiovascular:  RRR, S1/S2. Abdomen: Soft, non-tender, non-distended with active BS x 4. Musculoskeletal: No clubbing, cyanosis or edema bilaterally. Skin: Skin color, texture, turgor normal.  No visible rashes or lesions. Neurologic:  Neurovascularly intact without any focal sensory/motor deficits. Psychiatric: Alert and oriented x 3, thought content appropriate, normal insight  Capillary Refill: Brisk,< 3 seconds   Peripheral Pulses: +2 palpable, equal bilaterally     Survivorship Recommendations:    The patient has completed her breast cancer survivorship visit today. She was provided breast cancer survivorship care plan prepared by oncology nurse navigator and reviewed by myself. Care plan along with note from today's visit will be given to the patient's PCP Dr. Nelson Chavez, who is retiring soon; she plans to continue to follow in the same office with his CNP. Concerns addressed at today's visit - continued night sweats and joint pain-both mild and tolerable. 1. Screenings Recommended:    A. Annual Mammogram - Order has already been placed by KATHY Marcelo-CNP. She is scheduled on 1/26/2022. B. Regular Gynecologic Follow Up - Last exam with pap smear on 12/8/2021 per  Amanda Barros CNP with Dr. Irma Miller. C. If on aromatase inhibitor:      - DEXA Scan every 2 years - Completed 12/31/2018 with findings of   osteopenia and is a medium risk for fracture. Patient is scheduled on   1/26/2022. - Optimize bone health with calcium and vitamin D-she is currently taking   both. D. Colonoscopy Screening: Last colonoscopy done in 2012 by Dr. Rachid Chandler. E. Lung Cancer Screening: not indicated. 2. Education Provided:   A. Patient given handouts on surveillance and survivorship care. The patient will continue survivor follow-up including history, regular physical examination 1-4 times per year as clinically appropriate, then annually. Meeting with Medical Oncology, Radiation Oncology and General Surgery as deemed appropriate. Radiologic imaging to screen for distant recurrence will be not performed unless the patient will develop new symptoms. B. Patient educated on potential symptoms of recurrence including:     ?Constitutional symptoms - Anorexia, weight loss, malaise, fatigue, insomnia. ? Bone pain  ? Pulmonary symptoms - persistent cough or dyspnea (at rest or with exertion). ?Neurologic symptoms - Headache, nausea, vomiting, confusion, weakness, numbness or tingling.   ?Gastrointestinal symptoms - Right upper quadrant pain, change in bowel habits, presence of bloody or tarry stools. C. Dietary recommendations given and provided handouts today. As per NCCN, all survivors are encouraged to make informed choices about food intake, limit red meat to less than 18 oz per week, avoid processed meat. Limit refined sugars and processed foods. Eat diet that is at least 50% plant-based. Minimize alcohol intake. Patient encouraged to maintain a healthy weight. D. Exercise/Activity recommendations including: exercise 30 minutes 3-5 times per week including low-resistance weight training at least 2-3 times per week   E. Smoking Cessation if indicated: not indicated-does not smoke. F. Possible cardiac toxicity related to breast cancer treatment. Recommend following with PCP regularly to assess cardiovascular risk. BP monitoring/management. Cholesterol monitoring/management especially if on AI. Smoking cessation. Diet and weight management, DM management. Echo/EKG based on individual risk   G: Immunizations: as recommended per CDC    3. Services/Referrals Provided:    A. Financial Navigation - Financial toxicity screen completed, No referral indicated. [facility] - Patient provided nutritional handouts prepared by dietician. Referral not indicated. C. Rehabilitation services including PT Referral, OT Referral, ST Referral as indicated. No need indicated. D. Psychological support including , pastoral care, support groups. No need indicated. E. Genetic Counselor Evaluation - Does not qualify   F. Education Opportunities - Patient was provided nutritional handout for breast cancer survivors. G. Smoking Cessation - Pt does not smoke. 4. Follow-Up/Goals   We discussed the importance of maintaining a healthy weight, exercise (she enjoys walking), wearing SPF year-round, water intake.       -Medical Oncology, Dr. Dayana Kern, on 5/10/2022  -Radiation Oncology, KATHY Barrientos-CNP, on 3/21/2022  -General Surgery, Dr. Chepe Almanzar, on 3/28/2022      On this date 1/4/2022 I have spent 54 minutes reviewing previous notes, test results and face to face with the patient discussing the diagnosis and importance of compliance with the treatment plan as well as documenting on the day of the visit.     Electronically signed by   KATHY Lemons - CNP

## 2022-01-26 ENCOUNTER — HOSPITAL ENCOUNTER (OUTPATIENT)
Dept: WOMENS IMAGING | Age: 67
Discharge: HOME | End: 2022-01-26
Payer: COMMERCIAL

## 2022-01-26 DIAGNOSIS — Z85.3 PERSONAL HISTORY OF MALIGNANT NEOPLASM OF BREAST: ICD-10-CM

## 2022-01-26 DIAGNOSIS — N63.31 LUMP OF AXILLARY TAIL OF RIGHT BREAST: ICD-10-CM

## 2022-01-26 DIAGNOSIS — Z78.0 POSTMENOPAUSAL: ICD-10-CM

## 2022-01-26 PROCEDURE — 77080 DXA BONE DENSITY AXIAL: CPT

## 2022-01-26 PROCEDURE — 76882 US LMTD JT/FCL EVL NVASC XTR: CPT

## 2022-01-26 PROCEDURE — G0279 TOMOSYNTHESIS, MAMMO: HCPCS

## 2022-02-21 RX ORDER — ANASTROZOLE 1 MG/1
TABLET ORAL
Qty: 90 TABLET | Refills: 1 | Status: SHIPPED | OUTPATIENT
Start: 2022-02-21 | End: 2022-08-18

## 2022-03-21 ENCOUNTER — HOSPITAL ENCOUNTER (OUTPATIENT)
Dept: RADIATION ONCOLOGY | Age: 67
Discharge: HOME | End: 2022-03-21
Attending: RADIOLOGY
Payer: COMMERCIAL

## 2022-03-21 VITALS
BODY MASS INDEX: 29.57 KG/M2 | DIASTOLIC BLOOD PRESSURE: 67 MMHG | TEMPERATURE: 98.3 F | WEIGHT: 151.4 LBS | RESPIRATION RATE: 16 BRPM | OXYGEN SATURATION: 98 % | SYSTOLIC BLOOD PRESSURE: 146 MMHG | HEART RATE: 74 BPM

## 2022-03-21 PROCEDURE — 99215 OFFICE O/P EST HI 40 MIN: CPT | Performed by: NURSE PRACTITIONER

## 2022-03-21 PROCEDURE — 99212 OFFICE O/P EST SF 10 MIN: CPT | Performed by: NURSE PRACTITIONER

## 2022-03-21 NOTE — PROGRESS NOTES
[facility] [address]  [facility], [address]  Phone: [phone]   Toll Free: [phone]   Fax: [phone]    RADIATION ONCOLOGY FOLLOW UP REPORT    PATIENT NAME:  Shirley Toscano     : 1955  MEDICAL RECORD NO: [id number]    LOCATION: [facility] NO: [id number]      PROVIDER: KATHY Hurtado CNP        DATE OF SERVICE: 3/21/2022    FOLLOW UP PHYSICIANS: Dr. Alan Mcdonald; Dr. Brian Crespo     DIAGNOSIS: D05.11 intraductal carcinoma in situ of right breast.  Tis (DCIS) NXM0 stage 0 ER positive IL positive     DATE OF DIAGNOSIS: 2021     END OF TREATMENT DATE: 2021     ECOG PERFORMANCE STATUS: 0     PAIN: Denies     CHAPERONE: Declined        HPI:  Sandra underwent screening mammography and had a new cluster of calcifications in the right breast upper outer aspect middle depth.  She had scattered benign calcifications in both breasts.  Diagnostic mammography was performed and revealed clustered punctate calcifications in the right breast upper outer aspect middle depth.  There were no other suspicious abnormalities seen. Viviana Bronson underwent a stereotactic core biopsy. Pathology revealed ductal carcinoma in situ, intermediate nuclear grade, solid type with central necrosis and associated microcalcifications. Estrogen receptors were 100% positive progesterone receptors were 5% positive.     She was then seen by surgery for evaluation and treatment recommendations. She was agreeable to proceeding with breast conservation therapy and underwent a lumpectomy on 02/10/21 with pathology consistent with high grade DCIS pTisNX with close margins (<2mm). She then underwent a re-resection of disease on 21 with clear margins. INTERVAL HISTORY: Wilner Anderson returns to [address] [place] [address] for a post treatment follow up after undergoing surgery and radiation for right breast cancer. Vandana states she has started on Arimidex in 2021.  She reports some occasional hot flashes and arthralgias. She tried taking magnesium for the hot flashes but developed \"heart burn\". She has not tired anything else for the hot flashes. Sabino Roe states she noticed a lump under her right arm in December. She underwent mammogram and US in January which showed probably benign findings, ?sebaceous cyst. She will have repeat right mammogram and US in July to reevaluate. She states the \"lump\" has improved greatly and she is not able to feel it anymore. She denies chest pain, sob, fever, chills, nausea, abdominal pain, right breast swelling or tenderness, decreased right arm ROM or swelling, unintentional weight loss, loss of appetite or changes with bowel or bladder habits. LAB RESULTS:   No recent imaging. RADIOLOGY RESULTS:  1/26/2022: Bilateral diagnostic mammogram and Right breast US:   Impression   1. There is a superficial hyperechoic area with indistinct margins within the right axilla measuring 1.2 x 0.6 x 1.2 cm. This could represent a complex sebaceous cyst. However, recommend 6 month follow-up right axillary ultrasound to document stability    or resolution.       The patient was notified and a result letter will be sent to the patient. Uriel Brown will also receive a reminder 1 month prior to her follow-up exam.       BI-RADS CATEGORY 3: PROBABLY BENIGN FINDING.       Management Recommendation: Short interval follow-up or continued surveillance mammography. 1/26/2022: DEXA:   Impression   This qualifies as osteopenia based on the World Health Organization criteria.  The patient is at a medium risk for fracture.       10 year probability of major osteoporotic fracture: 17%   10 year probability of hip fracture: 1.0%           MEDICATIONS:   Current Outpatient Medications   Medication Sig Dispense Refill    anastrozole (ARIMIDEX) 1 MG tablet TAKE 1 TABLET BY MOUTH EVERY DAY 90 tablet 1    Triamterene-HCTZ (DYAZIDE PO) Take by mouth      Misc Natural Products (CALCIUM PLUS ADVANCED PO) Take by mouth      Multiple Vitamins-Minerals (VITAMIN D3 COMPLETE PO) Take by mouth      METFORMIN HCL PO Take 500 mg by mouth 4 times daily      pravastatin (PRAVACHOL) 20 MG tablet Take 20 mg by mouth daily.  Levothyroxine Sodium 75 MCG CAPS Take  by mouth Daily.  aspirin 81 MG tablet Take 81 mg by mouth daily. No current facility-administered medications for this encounter. ROS: As noted in the HPI and Interval history, otherwise negative. EXAMINATION:   GENERAL: Richard Tyler is a pleasant well-developed adult female. She is alert and oriented x3 in no acute distress. VITAL SIGNS: Weight 68.7 kg, temperature 36.8 °C, pulse 74, blood pressure 146/67, respirations 16, pulse ox 8% on room air. HEENT: Normocephalic, atraumatic. PERRL. EOMI. Ears, nose and lips are within normal limits on external examination. Oropharynx unremarkable. NECK: Supple. Without palpable cervical adenopathy. LYMPH: Without palpable supraclavicular axillary adenopathy. LUNGS: Clear without adventitious sounds. Respirations easy and unlabored. HEART: Regular rate and rhythm. Without murmur. BREASTS: Left breast is soft, nontender. There are no suspicious masses, lumps or architectural distortions. Nipple is everted without discharge. Right breast is soft, nontender with palpation. There are no suspicious masses or lumps. Surgical incision site well-healed without signs of infection. There is no residual hyperpigmentation from radiation skin is smooth. Nipple is everted without discharge. ABDOMEN: Soft, nontender, nondistended. Active bowel sounds x4. EXTREMITIES: Without clubbing or cyanosis. No edema noted. Full range of motion to bilateral upper extremities. NEUROLOGIC EXAMINATION: Cranial nerves grossly intact.         ASSESSMENT: Richard Tyler was diagnosed with hormone responsive DCIS right breast cancer in January 2021.   She underwent treatment with surgery which was

## 2022-03-28 ENCOUNTER — OFFICE VISIT (OUTPATIENT)
Dept: SURGERY | Age: 67
End: 2022-03-28
Payer: COMMERCIAL

## 2022-03-28 VITALS
BODY MASS INDEX: 30.44 KG/M2 | DIASTOLIC BLOOD PRESSURE: 80 MMHG | OXYGEN SATURATION: 100 % | RESPIRATION RATE: 18 BRPM | HEART RATE: 69 BPM | SYSTOLIC BLOOD PRESSURE: 118 MMHG | TEMPERATURE: 97.8 F | WEIGHT: 151 LBS | HEIGHT: 59 IN

## 2022-03-28 DIAGNOSIS — M85.80 OSTEOPENIA, UNSPECIFIED LOCATION: ICD-10-CM

## 2022-03-28 DIAGNOSIS — D05.11 DUCTAL CARCINOMA IN SITU (DCIS) OF RIGHT BREAST: Primary | ICD-10-CM

## 2022-03-28 PROCEDURE — 99213 OFFICE O/P EST LOW 20 MIN: CPT | Performed by: SURGERY

## 2022-03-28 ASSESSMENT — ENCOUNTER SYMPTOMS
BLOOD IN STOOL: 0
COLOR CHANGE: 0
SHORTNESS OF BREATH: 0
COUGH: 0
NAUSEA: 0
CHOKING: 0
WHEEZING: 0
SORE THROAT: 0
ABDOMINAL PAIN: 0

## 2022-03-28 NOTE — PROGRESS NOTES
Melyssa Barcenas MD   General Surgery  Follow up Patient Evaluation in Office  Pt Name: Issa Huddleston  Date of Birth 1955   Today's Date: 3/28/2022  Medical Record Number: [id number]  Referring Provider: No ref. provider found  Primary Care Provider: Scarlett SWEET  Chief Complaint:  Chief Complaint   Patient presents with    6 Month Follow-Up     Ductal carcinoma in situ (DCIS) of right breast-on Arimidex-Gui and DEXA scan 1/26/2022-saw Dr Mesfin Arias on 9/24/2021-Last seen in the office on 9/30/2021       ASSESSMENT      1. Ductal carcinoma in situ (DCIS) of right breast    2. Osteopenia, unspecified location    3. Type 2 diabetes mellitus   Stage 0   PLANS      1. Clinical breast examination benign  2. Breast cancer survivorship plan reviewed  3. Continue anastrozole. Patient following with Dr. Mesfin Arias. 4.  Mammography and DEXA scan reviewed. Patient with osteopenia. She takes calcium and vitamin D. Calcium level was high family doctor is following. She takes supplements 3 times a week. Mammography and ultrasound sebaceous cyst right axilla it is not palpable today   5. Encourage self breast examinations call for any changes  6. Follow-up surgical clinic in 6 months      SUBJECTIVE     Francisco Nicolas is a 61y.o. year old female who is presenting today in the office for follow-up of ductal carcinoma in situ of the right breast.   Anastasiya Lux was diagnosed with ductal carcinoma in situ of the right breast after she had abnormal screening mammogram in February 2021. She had a new cluster of calcifications in the upper outer aspect middle depth. Diagnostic imaging and stereotactic core biopsy was performed. Pathology revealed ductal carcinoma in situ intermediate nuclear grade with some central necrosis. Estrogen receptor receptors were 100% positive, progesterone receptors were 5% positive. She opted for breast conservation therapy and underwent a right partial mastectomy with preoperative localization.   On excision she had residual ductal carcinoma in situ DCIS was 1 mm deep and lateral margin. She underwent a course of external beam radiation therapy after reresection. She finished radiation on May 7. She started Arimidex which she is tolerating well she denies significant side effects. She has some tolerable hot flashes. She denies any breast pain or nipple discharge    Interval history: Vandana continues to do well. She denies any breast symptoms. Still some postoperative radiation changes in the right breast no palpable mass. She has no palpable masses in the right breast.  She is tolerating Arimidex well. No significant vasomotor symptoms, arthralgias or vaginal bleeding. Recent mammography in January no suspicious findings. She did have a sebaceous cyst in the right axilla which is now not palpable.         Menarche 16. Menopause 55. . Had children in 20's. Did not breastfeed. Stopped estroven, took for 3 to 4 years. No family history of breast or ovarian malignancy.     Past Medical History  Past Medical History:   Diagnosis Date    Active cochlear Meniere's disease     Breast [facility] ([facility] 2021    Diabetes mellitus ([facility]     type 2    History of therapeutic radiation 2021    Seasonal allergies     Sleep apnea     Thyroid disease        Past Surgical History  Past Surgical History:   Procedure Laterality Date    BLADDER SUSPENSION      BREAST LUMPECTOMY Right 2/10/2021    RIGHT BREAST LUMPETOMY, PREOP NEEDLE LOC performed by Angelina Jose MD at [facility] LUMPECTOMY Right 3/3/2021    RE-EXCISION MULTIPLE MARGINS, RIGHT BREAST performed by Angelina Jose MD at [place] [address]      Dr. Lou Suarez ISAIAS STEROTACTIC LOC BREAST BIOPSY RIGHT Right 2021    ISAIAS STEROTACTIC LOC BREAST BIOPSY RIGHT 2021 [id number]    US GUIDED NEEDLE LOC OF RIGHT BREAST Right 2/10/2021    US GUIDED NEEDLE LOC OF RIGHT BREAST 2/10/2021 Love Steen [facility]       Medications  Current Outpatient Medications   Medication Sig Dispense Refill    anastrozole (ARIMIDEX) 1 MG tablet TAKE 1 TABLET BY MOUTH EVERY DAY 90 tablet 1    Triamterene-HCTZ (DYAZIDE PO) Take by mouth      Misc Natural Products (CALCIUM PLUS ADVANCED PO) Take by mouth      Multiple Vitamins-Minerals (VITAMIN D3 COMPLETE PO) Take by mouth      METFORMIN HCL PO Take 500 mg by mouth 4 times daily      pravastatin (PRAVACHOL) 20 MG tablet Take 20 mg by mouth daily.  Levothyroxine Sodium 75 MCG CAPS Take  by mouth Daily.  aspirin 81 MG tablet Take 81 mg by mouth daily. No current facility-administered medications for this visit.      Allergies   No Known Allergies    Family History  Family History   Problem Relation Age of Onset    High Blood Pressure Mother     Heart Disease Mother     Atrial Fibrillation Mother     Cancer Father         leukemia & Lung    Colon Cancer Maternal Grandmother 64       SocialHistory  Social History     Socioeconomic History    Marital status:      Spouse name: Gene    Number of children: 3    Years of education: Not on file    Highest education level: Not on file   Occupational History    Not on file   Tobacco Use    Smoking status: Never Smoker    Smokeless tobacco: Never Used   Vaping Use    Vaping Use: Never used   Substance and Sexual Activity    Alcohol use: No    Drug use: No    Sexual activity: Yes     Partners: Male     Birth control/protection: Post-menopausal   Other Topics Concern    Not on file   Social History Narrative    Not on file     Social Determinants of Health     Financial Resource Strain:     Difficulty of Paying Living Expenses: Not on file   Food Insecurity:     Worried About Running Out of Food in the Last Year: Not on file    Bushra of Food in the Last Year: Not on file   Transportation Needs:     Lack of Transportation (Medical): Not on file    Lack of Transportation (Non-Medical): Not on file   Physical Activity:     Days of Exercise per Week: Not on file    Minutes of Exercise per Session: Not on file   Stress:     Feeling of Stress : Not on file   Social Connections:     Frequency of Communication with Friends and Family: Not on file    Frequency of Social Gatherings with Friends and Family: Not on file    Attends Confucianism Services: Not on file    Active Member of [address] [place] [address] or Organizations: Not on file    Attends Club or Organization Meetings: Not on file    Marital Status: Not on file   Intimate Partner Violence:     Fear of Current or Ex-Partner: Not on file    Emotionally Abused: Not on file    Physically Abused: Not on file    Sexually Abused: Not on file   Housing Stability:     Unable to Pay for Housing in the Last Year: Not on file    Number of [place] in the Last Year: Not on file    Unstable Housing in the Last Year: Not on file           Review of Systems  Review of Systems   Constitutional: Negative for chills, fatigue and unexpected weight change. HENT: Negative for congestion and sore throat. Eyes: Negative for visual disturbance. Respiratory: Negative for cough, choking, shortness of breath and wheezing. Cardiovascular: Negative for chest pain and palpitations. Gastrointestinal: Negative for abdominal pain, blood in stool and nausea. Endocrine: Negative for cold intolerance, heat intolerance and polydipsia. Genitourinary: Negative for dysuria, flank pain and hematuria. Musculoskeletal: Negative for arthralgias, gait problem, joint swelling and myalgias. Skin: Negative for color change and rash. Allergic/Immunologic: Negative for immunocompromised state. Neurological: Negative for dizziness, tremors, seizures and headaches. Hematological: Negative for adenopathy. Does not bruise/bleed easily.    Psychiatric/Behavioral: Negative for behavioral problems, confusion and suicidal ideas. OBJECTIVE     /80 (Site: Left Upper Arm, Position: Sitting, Cuff Size: Medium Adult)   Pulse 69   Temp 97.8 °F (36.6 °C) (Temporal)   Resp 18   Ht 4' 11\" (1.499 m)   Wt 151 lb (68.5 kg)   SpO2 100%   BMI 30.50 kg/m²      Physical Exam  Constitutional:       Appearance: Normal appearance. She is well-developed. She is not ill-appearing. HENT:      Head: Normocephalic and atraumatic. Eyes:      General: No scleral icterus. Pupils: Pupils are equal, round, and reactive to light. Neck:      Vascular: No JVD. Trachea: No tracheal deviation. Cardiovascular:      Rate and Rhythm: Normal rate and regular rhythm. Heart sounds: Normal heart sounds. No murmur heard. Pulmonary:      Effort: Pulmonary effort is normal. No respiratory distress. Breath sounds: No wheezing. Chest:      Chest wall: No tenderness. Breasts:      Right: No inverted nipple, mass, nipple discharge, skin change, tenderness, axillary adenopathy or supraclavicular adenopathy. Left: No inverted nipple, mass, nipple discharge, skin change, tenderness, axillary adenopathy or supraclavicular adenopathy. Abdominal:      General: There is no distension. Palpations: There is no mass. Tenderness: There is no abdominal tenderness. Musculoskeletal:         General: No deformity. Cervical back: Neck supple. Lymphadenopathy:      Cervical: No cervical adenopathy. Right cervical: No superficial, deep or posterior cervical adenopathy. Left cervical: No superficial, deep or posterior cervical adenopathy. Upper Body:      Right upper body: No supraclavicular, axillary or pectoral adenopathy. Left upper body: No supraclavicular, axillary or pectoral adenopathy. Skin:     General: Skin is warm and dry. Coloration: Skin is not jaundiced or pale. Findings: No rash. Comments: Very tanned. Patient going to tanning bed.    Neurological: General: No focal deficit present. Mental Status: She is alert and oriented to person, place, and time. Cranial Nerves: No cranial nerve deficit. Psychiatric:         Mood and Affect: Mood normal.         Behavior: Behavior normal.         Thought Content: Thought content normal.         Lab Results   Component Value Date    HGB 14.5 02/01/2021    HCT 45.7 02/01/2021     02/01/2021    K 4.0 02/01/2021     02/01/2021    CREATININE 0.9 02/01/2021    BUN 17 02/01/2021    CO2 28 02/01/2021        [facility]       PROCEDURE: [facility] ABILIO DIGITAL DIAGNOSTIC BILATERAL, [facility] US EXTREMITY RIGHT NON VASC LIMITED       CLINICAL INFORMATION: Personal history of malignant neoplasm of breast . Tomosynthesis.       PATIENT MEDICAL HISTORY: Medical history includes breast cancer.       FAMILY HISTORY: No relevant family history has been documented for this patient.       RISK VALUES: Niki Filyahir.: n/a%, Jyay life: n/a%       COMPARISON: 2/10/2021, 1/22/2021, 1/7/2021, 1/4/2021, 1/2/2020, 12/31/2018       [facility] ABILIO DIGITAL DIAGNOSTIC BILATERAL:       TECHNIQUE: Bilateral CC and MLO views were obtained each breast. Tomosynthesis was used. CAD was used.        BREAST COMPOSITION: The tissue of the breast(s) is heterogeneously dense. This may lower the sensitivity of mammography.       FINDINGS:        There is no mammographic abnormality at the site of the palpable concern. Targeted ultrasound evaluation of the right axilla is reported below No significant masses, calcifications, or other findings are seen in the breast(s).       Postoperative findings are noted within the right breast.           [facility] US EXTREMITY RIGHT NON VASC LIMITED:       TECHNIQUE: Targeted ultrasound of the right breast was performed. Grayscale images and color images of the real-time examination were reviewed.  The axilla was also imaged.       FINDINGS:        There is a superficial hyperechoic area with indistinct margins within the right axilla measuring 1.2 x 0.6 x 1.2 cm. This could represent a complex sebaceous cyst. However, recommend 6 month follow-up right axillary ultrasound to document stability or    resolution.           Impression   1. There is a superficial hyperechoic area with indistinct margins within the right axilla measuring 1.2 x 0.6 x 1.2 cm. This could represent a complex sebaceous cyst. However, recommend 6 month follow-up right axillary ultrasound to document stability    or resolution.       The patient was notified and a result letter will be sent to the patient. Brayden Garcia will also receive a reminder 1 month prior to her follow-up exam.                   BI-RADS CATEGORY 3: PROBABLY BENIGN FINDING.       Management Recommendation: Short interval follow-up or continued surveillance mammography.            EXAM: DEXA BONE DENSITY AXIAL SKELETON        Bone Mineral Density       HISTORY: 61 years, Female, Postmenopausal.       COMPARISON: 12/31/2018.       FINDINGS:   Bone densitometry has been performed using a Hologic bone densitometer.  This evaluation includes the lumbar spine and both hips.       Current exam:       Lumbar Spine: L1-L4   Bone mineral density (gm/cm2): 0.925, T-score: -1.1, Z-score: 0.6, This qualifies as osteopenia.       Femoral Neck Left:    Bone mineral density (gm/cm2): 0.666, T-score: -1.7, Z-score: -0.2, This qualifies as osteopenia.       Femoral Neck Right:    Bone mineral density (gm/cm2): 0.705, T-score: -1.3, Z-score: 0.2, This qualifies as osteopenia.       Total Hip Left:    Bone mineral density (gm/cm2): 0.854, T-score: -0.7, Z-score: 0.5, This qualifies as normal bone mineral density.       Total Hip Right:   Bone mineral density (gm/cm2): 0.856, T-score: -0.7, Z-score: 0.5, This qualifies as normal bone mineral density.       Prior exam:        Lumbar Spine: L1-L4   Bone mineral density (gm/cm2): 0.899, T-score: -1.3, Z-score: 0.2, This qualifies as osteopenia.       Femoral Neck Left:    Bone mineral density (gm/cm2): 0.675, T-score: -1.6, Z-score: -0.2, This qualifies as osteopenia.       Femoral Neck Right:    Bone mineral density (gm/cm2): 0.689, T-score: -1.4, Z-score: -0.1, This qualifies as osteopenia.       Total Hip Left:    Bone mineral density (gm/cm2): 0.833, T-score: -0.9, Z-score: 0.1, This qualifies as normal bone mineral density.       Total Hip Right:   Bone mineral density (gm/cm2): 0.826, T-score: -1.0, Z-score: 0.1, This qualifies as normal bone mineral density.           Prior spine BMD 0.899, T score -1.3   BMD changes versus previous is 2.9% in the spine. This is based on dissimilar scan types or analysis methods.       Prior mean value for the hip(s) BMD 0.682, T score -1.5   BMD change versus previous is 0.5% for the hips using the mean.  This is based on dissimilar scan types or analysis methods.                           Impression   This qualifies as osteopenia based on the World Health Organization criteria. The patient is at a medium risk for fracture.       10 year probability of major osteoporotic fracture: 17%   10 year probability of hip fracture: 1.0%       .           Recommendations:   Therapy recommendations need to be tailored to each individual patient. Using the [facility]) FRAX absolute fracture algorithm, the [address] [place] [address] recommends beginning pharmacological therapy in postmenopausal women    and men over the age of 48 with a 8 year probability of a hip fracture of >3% OR with the 10 year probability of a major osteoporotic fracture of >20%.     Please reconsider testing based on risk factors. Currently, Medicare will only reimburse for a central DEXA examination every two years, unless the patient is on chronic glucocorticoid therapy.           **This report has been created using voice recognition software. It may contain minor errors which are inherent in voice recognition technology. **       Final report electronically signed by Dr. Dmitry Beal on 1/27/2022 7:31 AM           New imaging reviewed

## 2022-05-10 ENCOUNTER — OFFICE VISIT (OUTPATIENT)
Dept: ONCOLOGY | Age: 67
End: 2022-05-10
Payer: COMMERCIAL

## 2022-05-10 ENCOUNTER — HOSPITAL ENCOUNTER (OUTPATIENT)
Dept: INFUSION THERAPY | Age: 67
Discharge: HOME | End: 2022-05-10
Payer: COMMERCIAL

## 2022-05-10 VITALS
OXYGEN SATURATION: 100 % | DIASTOLIC BLOOD PRESSURE: 73 MMHG | HEART RATE: 88 BPM | TEMPERATURE: 99.3 F | SYSTOLIC BLOOD PRESSURE: 155 MMHG | RESPIRATION RATE: 16 BRPM

## 2022-05-10 VITALS
HEIGHT: 59 IN | WEIGHT: 153.6 LBS | DIASTOLIC BLOOD PRESSURE: 73 MMHG | RESPIRATION RATE: 16 BRPM | BODY MASS INDEX: 30.96 KG/M2 | SYSTOLIC BLOOD PRESSURE: 155 MMHG | HEART RATE: 88 BPM | OXYGEN SATURATION: 100 % | TEMPERATURE: 99.3 F

## 2022-05-10 DIAGNOSIS — Z86.000 ENCOUNTER FOR FOLLOW-UP SURVEILLANCE OF DUCTAL CARCINOMA IN SITU (DCIS) OF BREAST: ICD-10-CM

## 2022-05-10 DIAGNOSIS — D05.11 DUCTAL CARCINOMA IN SITU (DCIS) OF RIGHT BREAST: Primary | ICD-10-CM

## 2022-05-10 DIAGNOSIS — Z79.811 PROPHYLACTIC USE OF ANASTROZOLE (ARIMIDEX): ICD-10-CM

## 2022-05-10 DIAGNOSIS — Z08 ENCOUNTER FOR FOLLOW-UP SURVEILLANCE OF DUCTAL CARCINOMA IN SITU (DCIS) OF BREAST: ICD-10-CM

## 2022-05-10 DIAGNOSIS — Z98.890 STATUS POST RIGHT BREAST LUMPECTOMY: ICD-10-CM

## 2022-05-10 PROCEDURE — 99214 OFFICE O/P EST MOD 30 MIN: CPT | Performed by: INTERNAL MEDICINE

## 2022-05-10 PROCEDURE — 99211 OFF/OP EST MAY X REQ PHY/QHP: CPT

## 2022-05-10 ASSESSMENT — ENCOUNTER SYMPTOMS
VOMITING: 0
SORE THROAT: 0
CONSTIPATION: 0
COLOR CHANGE: 0
BLOOD IN STOOL: 0
TROUBLE SWALLOWING: 0
ABDOMINAL PAIN: 0
ABDOMINAL DISTENTION: 0
SHORTNESS OF BREATH: 0
NAUSEA: 0
CHEST TIGHTNESS: 0
BACK PAIN: 0
RECTAL PAIN: 0
FACIAL SWELLING: 0
DIARRHEA: 0
COUGH: 0
WHEEZING: 0
EYE DISCHARGE: 0

## 2022-05-10 NOTE — PROGRESS NOTES
[facility] [address] [facility] [address], [address] [facility] [address]  [address]ey[address] Lao [address]  Dept: [phone]  Dept Fax: [phone]: [phone]    Visit Date:5/10/2022     Gene Valdovinos is a 61 y.o. female who presents today for:   Chief Complaint   Patient presents with    Follow-up     Ductal carcinoma in situ (DCIS) of right breast        HPI: This is a 72-year-old patient with h/o DCIS of the right breast.    She underwent screening mammography on 2021 it showed a new cluster of calcifications in the right breast upper outer aspect middle depth.  Diagnostic mammography revealed clustered punctate calcifications in the right breast upper outer aspect middle depth.  There were no other suspicious abnormalities seen.  She underwent a stereotactic core biopsy on 2021. Pathology revealed ductal carcinoma in situ, intermediate nuclear grade, solid type with central necrosis and associated microcalcifications.  Estrogen receptors 100% positive, progesterone receptor 5% positive. Subsequently she met with the surgeon to discuss her surgical treatment options. She decided to proceed with breast conservation therapy. The patient underwent lumpectomy on February 10, 2021. Final pathology report showed:  A. Breast, right, lumpectomy:       Ductal carcinoma in situ, nuclear grade 3, solid and cribriform       types, with comedonecrosis and microcalcifications, pTis (DCIS) NX.    See microscopic for margin status.    Changes consistent with previous biopsy site. B. Breast, right, new skin caudal margin, excision:    Fibrocystic changes including dense fibrosis and microcyst formation.    Fat necrosis.    Negative for malignancy. Due to very close/positive margin the patient underwent reexcision on March 3, 2021. Showed residual and DCIS. New margins were negative. Postsurgical course is unremarkable. Menarche 17. Menopause 55. . Had children in 20's. Did not breastfeed.  Stopped estroven recently, took for 3 to 4 years. No family history of breast or ovarian malignancy. She received total cumulative dose of 5256 cGy in 20 fractions. In May 2021 she started prophylactic hormonal treatment    Interim history on 05/10/2022:  Presents for 3 months follow-up visit. She reports that she tolerates Arimidex well. She denies having any hot flashes minimal arthralgia. The patient denies any complaints related to her breasts. She denies having any new complaints today. No recent hospitalizations.   HPI   Past Medical History:   Diagnosis Date    Active cochlear Meniere's disease     Breast cancer ([facility] 01/25/2021    Diabetes mellitus ([facility]     type 2    History of therapeutic radiation 04/01/2021    Seasonal allergies     Sleep apnea     Thyroid disease       Past Surgical History:   Procedure Laterality Date    BLADDER SUSPENSION  1990    BREAST LUMPECTOMY Right 2/10/2021    RIGHT BREAST LUMPETOMY, PREOP NEEDLE LOC performed by Elena Motta MD at [address] [facility] LUMPECTOMY Right 3/3/2021    RE-EXCISION MULTIPLE MARGINS, RIGHT BREAST performed by Elena Motta MD at [place] [address]  2006    Dr. Gilbert Banegas IS[facility]AS STEROTACTIC LOC BREAST BIOPSY RIGHT Right 01/22/2021    ISAIAS STEROTACTIC LOC BREAST BIOPSY RIGHT 1/22/2021 [address] [place] [address]    US GUIDED NEEDLE LOC OF RIGHT BREAST Right 2/10/2021    US GUIDED NEEDLE LOC OF RIGHT BREAST 2/10/2021 Sánchez Meneses MD [facility]      Family History   Problem Relation Age of Onset    High Blood Pressure Mother     Heart Disease Mother     Atrial Fibrillation Mother     Cancer Father         leukemia & Lung    Colon Cancer Maternal Grandmother 64      Social History     Tobacco Use    Smoking status: Never Smoker    Smokeless tobacco: Never Used   Substance Use Topics    Alcohol use: No      Current Outpatient Medications   Medication Sig Dispense Refill  anastrozole (ARIMIDEX) 1 MG tablet TAKE 1 TABLET BY MOUTH EVERY DAY 90 tablet 1    Triamterene-HCTZ (DYAZIDE PO) Take by mouth      Misc Natural Products (CALCIUM PLUS ADVANCED PO) Take by mouth      Multiple Vitamins-Minerals (VITAMIN D3 COMPLETE PO) Take by mouth      METFORMIN HCL PO Take 500 mg by mouth 4 times daily      pravastatin (PRAVACHOL) 20 MG tablet Take 20 mg by mouth daily.  Levothyroxine Sodium 75 MCG CAPS Take  by mouth Daily.  aspirin 81 MG tablet Take 81 mg by mouth daily. No current facility-administered medications for this visit. No Known Allergies   Health Maintenance   Topic Date Due    Diabetic foot exam  Never done    Lipids  Never done    Depression Screen  Never done    HIV screen  Never done    Diabetic microalbuminuria test  Never done    Diabetic retinal exam  Never done    Hepatitis C screen  Never done    DTaP/Tdap/Td vaccine (1 - Tdap) Never done    Colorectal Cancer Screen  Never done    Shingles vaccine (1 of 2) Never done    COVID-19 Vaccine (3 - Pfizer risk 4-dose series) 05/04/2021    A1C test (Diabetic or Prediabetic)  10/22/2021    Pneumococcal 0-64 years Vaccine (4 - PPSV23 or PCV20) 06/24/2022    Breast cancer screen  01/26/2023    Cervical cancer screen  12/08/2024    Flu vaccine  Completed    Hepatitis A vaccine  Aged Out    Hib vaccine  Aged Out    Meningococcal (ACWY) vaccine  Aged Out        Subjective:   Review of Systems   Constitutional: Negative for activity change, appetite change, fatigue and fever. HENT: Negative for congestion, dental problem, facial swelling, hearing loss, mouth sores, nosebleeds, sore throat, tinnitus and trouble swallowing. Eyes: Negative for discharge and visual disturbance. Respiratory: Negative for cough, chest tightness, shortness of breath and wheezing. Cardiovascular: Negative for chest pain, palpitations and leg swelling.    Gastrointestinal: Negative for abdominal distention, abdominal pain, blood in stool, constipation, diarrhea, nausea, rectal pain and vomiting. Endocrine: Negative for cold intolerance, polydipsia and polyuria. Genitourinary: Negative for decreased urine volume, difficulty urinating, dysuria, flank pain, hematuria and urgency. Musculoskeletal: Negative for arthralgias, back pain, gait problem, joint swelling, myalgias and neck stiffness. Skin: Negative for color change, rash and wound. Neurological: Negative for dizziness, tremors, seizures, speech difficulty, weakness, light-headedness, numbness and headaches. Hematological: Negative for adenopathy. Does not bruise/bleed easily. Psychiatric/Behavioral: Negative for confusion and sleep disturbance. The patient is not nervous/anxious. Objective:   Physical Exam  Vitals reviewed. Constitutional:       General: She is not in acute distress. Appearance: She is well-developed. HENT:      Head: Normocephalic. Mouth/Throat:      Pharynx: No oropharyngeal exudate. Eyes:      General: No scleral icterus. Right eye: No discharge. Left eye: No discharge. Pupils: Pupils are equal, round, and reactive to light. Neck:      Thyroid: No thyromegaly. Vascular: No JVD. Trachea: No tracheal deviation. Cardiovascular:      Rate and Rhythm: Normal rate. Heart sounds: Normal heart sounds. No murmur heard. No friction rub. No gallop. Pulmonary:      Effort: Pulmonary effort is normal. No respiratory distress. Breath sounds: Normal breath sounds. No stridor. No wheezing or rales. Chest:      Chest wall: No tenderness. Breasts:      Right: Skin change (s/p lumpectomy. Lumpectomy incision nicely healed.) present. Abdominal:      General: Bowel sounds are normal. There is no distension. Palpations: Abdomen is soft. There is no mass. Tenderness: There is no abdominal tenderness. There is no rebound.    Musculoskeletal:         General: Normal range of motion. Cervical back: Normal range of motion and neck supple. Comments: Good range of motion in all four extremities. Lymphadenopathy:      Cervical: No cervical adenopathy. Skin:     General: Skin is warm. Findings: No erythema or rash. Neurological:      Mental Status: She is alert and oriented to person, place, and time. Cranial Nerves: No cranial nerve deficit. Motor: No abnormal muscle tone. Deep Tendon Reflexes: Reflexes are normal and symmetric. Psychiatric:         Behavior: Behavior normal.         Thought Content: Thought content normal.         Judgment: Judgment normal.         BP (!) 155/73 (Site: Left Upper Arm, Position: Sitting, Cuff Size: Medium Adult)   Pulse 88   Temp 99.3 °F (37.4 °C) (Oral)   Resp 16   Ht 4' 11\" (1.499 m)   Wt 153 lb 9.6 oz (69.7 kg)   SpO2 100%   BMI 31.02 kg/m²      ECOG status is 0    Imaging studies and labs:     Lab Results   Component Value Date    HGB 14.5 02/01/2021    HCT 45.7 02/01/2021       Chemistry        Component Value Date/Time     02/01/2021 1059    K 4.0 02/01/2021 1059     02/01/2021 1059    CO2 28 02/01/2021 1059    BUN 17 02/01/2021 1059    CREATININE 0.9 02/01/2021 1059        Component Value Date/Time    CALCIUM 10.9 (H) 02/01/2021 1059        DEXA study in January 2022 showed: This qualifies as osteopenia based on the World Health Organization criteria. Mammogram in January 2022 showed:  1. There is a superficial hyperechoic area with indistinct margins within the right axilla measuring 1.2 x 0.6 x 1.2 cm. This could represent a complex sebaceous cyst. However, recommend 6 month follow-up right axillary ultrasound to document stability    or resolution.       The patient was notified and a result letter will be sent to the patient. Marcelinomanny Carrillo will also receive a reminder 1 month prior to her follow-up exam.                   BI-RADS CATEGORY 3: PROBABLY BENIGN FINDING. Assessment/Plan:   1. Right breast DCIS. ER positive, NM positive. Patient is s/p lumpectomy. Radiation therapy is the standard for patients treated with BCT, though it may be reasonable to omit in selected patients with advanced age, extensive comorbidities, or small foci of low-grade disease resected with negative margins. None applies to the patient. She had DCIS larger than 10 mm in size with high histologic grade. Completed radiation treatment, she received total cumulative dose of 5250 cGy in 20 fractions. 2.  Chemoprevention. Following local treatment, the decision to administer endocrine therapy to reduce the risk of subsequent cancers depends upon the tumor hormone receptor status and the choice of local therapy. The primary role of systemic treatment is to reduce the risk of invasive breast cancer in the ipsilateral or contralateral breast. The [employer]/NSABP B-35 trial, which enrolled over 3100 postmenopausal women with hormone receptor-positive DCIS who underwent BCT, demonstrated that Arimidex resulted in a decreased rate of breast cancer events at 10 years compared with tamoxifen. Toxicities associated with aromatase inhibitors include loss of bone density, fractures, and cardiovascular risk. After discussion the patient decided to proceed with chemoprevention. She tolerates Arimidex well. She denies having any arthralgia no hot flashes. Her breast physical examination is unremarkable. The patient was instructed to continue Arimidex. DEXA study showed osteopenia. The patient had bone density study that showed osteopenia. We discussed lifestyle measures include adequate calcium and vitamin D, exercise, smoking cessation, fall prevention, and avoidance of heavy alcohol use. In general, 1200 mg of elemental calcium daily, total diet plus supplement, and 800 international units of vitamin D daily are recommended.   Mammogram in January 2022 showed BI-RADS Category 2 probably benign findings postsurgical changes. Diagnosis Orders   1. Ductal carcinoma in situ (DCIS) of right breast     2. Prophylactic use of anastrozole (Arimidex)     3. Status post right breast lumpectomy     4. Encounter for follow-up surveillance of ductal carcinoma in situ (DCIS) of breast          Plan:   No follow-ups on file. Orders Placed:   No orders of the defined types were placed in this encounter. Medications Prescribed:   No orders of the defined types were placed in this encounter.

## 2022-08-01 ENCOUNTER — HOSPITAL ENCOUNTER (OUTPATIENT)
Dept: WOMENS IMAGING | Age: 67
Discharge: HOME | End: 2022-08-01
Payer: MEDICARE

## 2022-08-01 DIAGNOSIS — L72.3 SEBACEOUS CYST: ICD-10-CM

## 2022-08-01 DIAGNOSIS — Z09 FOLLOW UP: ICD-10-CM

## 2022-08-01 PROCEDURE — 76882 US LMTD JT/FCL EVL NVASC XTR: CPT

## 2022-08-18 RX ORDER — ANASTROZOLE 1 MG/1
TABLET ORAL
Qty: 90 TABLET | Refills: 1 | Status: SHIPPED | OUTPATIENT
Start: 2022-08-18

## 2022-09-26 ENCOUNTER — OFFICE VISIT (OUTPATIENT)
Dept: SURGERY | Age: 67
End: 2022-09-26
Payer: MEDICARE

## 2022-09-26 VITALS
BODY MASS INDEX: 30.84 KG/M2 | TEMPERATURE: 96.7 F | HEIGHT: 59 IN | HEART RATE: 64 BPM | WEIGHT: 153 LBS | RESPIRATION RATE: 18 BRPM | SYSTOLIC BLOOD PRESSURE: 118 MMHG | DIASTOLIC BLOOD PRESSURE: 62 MMHG | OXYGEN SATURATION: 97 %

## 2022-09-26 DIAGNOSIS — D05.11 DUCTAL CARCINOMA IN SITU (DCIS) OF RIGHT BREAST: Primary | ICD-10-CM

## 2022-09-26 DIAGNOSIS — E11.9 TYPE 2 DIABETES MELLITUS WITHOUT COMPLICATION, WITHOUT LONG-TERM CURRENT USE OF INSULIN (HCC): ICD-10-CM

## 2022-09-26 DIAGNOSIS — M85.80 OSTEOPENIA, UNSPECIFIED LOCATION: ICD-10-CM

## 2022-09-26 PROCEDURE — 1123F ACP DISCUSS/DSCN MKR DOCD: CPT | Performed by: SURGERY

## 2022-09-26 PROCEDURE — G9899 SCRN MAM PERF RSLTS DOC: HCPCS | Performed by: SURGERY

## 2022-09-26 PROCEDURE — G8399 PT W/DXA RESULTS DOCUMENT: HCPCS | Performed by: SURGERY

## 2022-09-26 PROCEDURE — 3017F COLORECTAL CA SCREEN DOC REV: CPT | Performed by: SURGERY

## 2022-09-26 PROCEDURE — 1090F PRES/ABSN URINE INCON ASSESS: CPT | Performed by: SURGERY

## 2022-09-26 PROCEDURE — G8417 CALC BMI ABV UP PARAM F/U: HCPCS | Performed by: SURGERY

## 2022-09-26 PROCEDURE — 3046F HEMOGLOBIN A1C LEVEL >9.0%: CPT | Performed by: SURGERY

## 2022-09-26 PROCEDURE — G8427 DOCREV CUR MEDS BY ELIG CLIN: HCPCS | Performed by: SURGERY

## 2022-09-26 PROCEDURE — 1036F TOBACCO NON-USER: CPT | Performed by: SURGERY

## 2022-09-26 PROCEDURE — 99214 OFFICE O/P EST MOD 30 MIN: CPT | Performed by: SURGERY

## 2022-09-26 PROCEDURE — 2022F DILAT RTA XM EVC RTNOPTHY: CPT | Performed by: SURGERY

## 2022-09-26 ASSESSMENT — ENCOUNTER SYMPTOMS
BLOOD IN STOOL: 0
COUGH: 0
NAUSEA: 0
ABDOMINAL PAIN: 0
SORE THROAT: 0
SHORTNESS OF BREATH: 0
WHEEZING: 0
CHOKING: 0
COLOR CHANGE: 0

## 2022-09-26 NOTE — PROGRESS NOTES
Shannon Mckay MD   General Surgery  Follow up Patient Evaluation in Office  Pt Name: Laurence Kinney  Date of Birth 1955   Today's Date: 9/26/2022  Medical Record Number: [id number]  Referring Provider: No ref. provider found  Primary Care Provider: Jeffy SWEET  Chief Complaint:  Chief Complaint   Patient presents with    6 Month Follow-Up     Ductal carcinoma in situ (DCIS) of right breast-on Arimidex-Gui and DEXA scan 1/27/2022-Last seen in the office on 3/28/22 -Following with Darrell Barbosa CNP       ASSESSMENT      1. Ductal carcinoma in situ (DCIS) of right breast    2. Osteopenia, unspecified location    3. Type 2 diabetes mellitus without complication, without long-term current use of insulin ([facility] [address]    3. Type 2 diabetes mellitus   Stage 0   PLANS      1. Clinical breast examination  today is benign  2. Breast cancer survivorship plan reviewed  3. Continue anastrozole under guidance of medical oncology  4. Ultrasound reviewed right axilla. Normal axilla. Sebaceous cyst resolved. 5.  Encourage self breast examinations call for any changes  6. Follow-up surgical clinic in 6 months    7. Continue oral calcium and vitamin D supplementation. As well as get regular exercise. DEXA scan every 2 years. SUBJECTIVE   History of present illness:  Roland Cunha is a 74y.o. year old female who is presenting today in the office for follow-up of ductal carcinoma in situ of the right breast.   Devaughn Solorzano was diagnosed with ductal carcinoma in situ of the right breast after she had abnormal screening mammogram in February 2021. She had a new cluster of calcifications in the upper outer aspect middle depth. Diagnostic imaging and stereotactic core biopsy was performed. Pathology revealed ductal carcinoma in situ intermediate nuclear grade with some central necrosis. Estrogen receptor receptors were 100% positive, progesterone receptors were 5% positive.   She opted for breast conservation therapy and History of therapeutic radiation 04/01/2021    Seasonal allergies     Sleep apnea     Thyroid disease        Past Surgical History  Past Surgical History:   Procedure Laterality Date    BLADDER SUSPENSION  1990    BREAST LUMPECTOMY Right 2/10/2021    RIGHT BREAST LUMPETOMY, PREOP NEEDLE LOC performed by Kathya Davis MD at [facility] LUMPECTOMY Right 3/3/2021    RE-EXCISION MULTIPLE MARGINS, RIGHT BREAST performed by Kathya Davis MD at [facility]  2006    Dr. Raj Gowers    [facility]IAS STEROTACTIC LOC BREAST BIOPSY RIGHT Right 01/22/2021    ISAIAS STEROTACTIC LOC BREAST BIOPSY RIGHT 1/22/2021 [place] GUIDED NEEDLE LOC OF RIGHT BREAST Right 2/10/2021    [place] GUIDED NEEDLE LOC OF RIGHT BREAST 2/10/2021 Nia Veloz MD [facility]       Medications  Current Outpatient Medications   Medication Sig Dispense Refill    anastrozole (ARIMIDEX) 1 MG tablet TAKE 1 TABLET BY MOUTH EVERY DAY 90 tablet 1    Triamterene-HCTZ (DYAZIDE PO) Take by mouth      Misc Natural Products (CALCIUM PLUS ADVANCED PO) Take by mouth      Multiple Vitamins-Minerals (VITAMIN D3 COMPLETE PO) Take by mouth      METFORMIN HCL PO Take 500 mg by mouth 4 times daily      pravastatin (PRAVACHOL) 20 MG tablet Take 20 mg by mouth daily. Levothyroxine Sodium 75 MCG CAPS Take  by mouth Daily. aspirin 81 MG tablet Take 81 mg by mouth daily. No current facility-administered medications for this visit.      Allergies   No Known Allergies    Family History  Family History   Problem Relation Age of Onset    High Blood Pressure Mother     Heart Disease Mother     Atrial Fibrillation Mother     Cancer Father         leukemia & Lung    Colon Cancer Maternal Grandmother 64       SocialHistory  Social History     Socioeconomic History    Marital status:      Spouse name: Gene    Number of children: 3    Years of education: Not on file    Highest education level: Not on file   Occupational History    Not on file   Tobacco Use    Smoking status: Never    Smokeless tobacco: Never   Vaping Use    Vaping Use: Never used   Substance and Sexual Activity    Alcohol use: No    Drug use: No    Sexual activity: Yes     Partners: Male     Birth control/protection: Post-menopausal   Other Topics Concern    Not on file   Social History Narrative    Not on file     Social Determinants of Health     Financial Resource Strain: Not on file   Food Insecurity: Not on file   Transportation Needs: Not on file   Physical Activity: Not on file   Stress: Not on file   Social Connections: Not on file   Intimate Partner Violence: Not on file   Housing Stability: Not on file           Review of Systems  Review of Systems   Constitutional:  Negative for chills, fatigue and unexpected weight change. HENT:  Negative for congestion and sore throat. Eyes:  Negative for visual disturbance. Respiratory:  Negative for cough, choking, shortness of breath and wheezing. Cardiovascular:  Negative for chest pain and palpitations. Gastrointestinal:  Negative for abdominal pain, blood in stool and nausea. Endocrine: Negative for cold intolerance, heat intolerance and polydipsia. Genitourinary:  Negative for dysuria, flank pain and hematuria. Musculoskeletal:  Negative for arthralgias, gait problem, joint swelling and myalgias. Skin:  Negative for color change and rash. Allergic/Immunologic: Negative for immunocompromised state. Neurological:  Negative for dizziness, tremors, seizures and headaches. Hematological:  Negative for adenopathy. Does not bruise/bleed easily. Psychiatric/Behavioral:  Negative for behavioral problems, confusion and suicidal ideas.       OBJECTIVE     /62 (Site: Right Upper Arm, Position: Sitting, Cuff Size: Medium Adult)   Pulse 64   Temp (!) 96.7 °F (35.9 °C) (Temporal)   Resp 18   Ht 4' 11\" (1.499 m)   Wt 153 lb (69.4 kg)   SpO2 97% BMI 30.90 kg/m²      Physical Exam  Constitutional:       Appearance: Normal appearance. She is well-developed. She is not ill-appearing or diaphoretic. HENT:      Head: Normocephalic and atraumatic. Nose: No congestion. Eyes:      General: No scleral icterus. Extraocular Movements: Extraocular movements intact. Pupils: Pupils are equal, round, and reactive to light. Neck:      Vascular: No JVD. Trachea: No tracheal deviation. Cardiovascular:      Rate and Rhythm: Normal rate and regular rhythm. Pulses: Normal pulses. Heart sounds: Normal heart sounds. Pulmonary:      Effort: Pulmonary effort is normal. No respiratory distress. Breath sounds: Normal breath sounds. No wheezing. Chest:      Chest wall: No tenderness. Breasts:     Right: No swelling, bleeding, inverted nipple, mass, nipple discharge, skin change or tenderness. Left: No swelling, bleeding, inverted nipple, mass, nipple discharge, skin change or tenderness. Comments: Slight residual erythema to the right breast compared to the left post radiation  Abdominal:      General: Abdomen is flat. There is no distension. Palpations: Abdomen is soft. There is no mass. Tenderness: There is no abdominal tenderness. Musculoskeletal:         General: No deformity. Cervical back: Neck supple. No tenderness. Lymphadenopathy:      Cervical: No cervical adenopathy. Right cervical: No superficial, deep or posterior cervical adenopathy. Left cervical: No superficial, deep or posterior cervical adenopathy. Upper Body:      Right upper body: No supraclavicular, axillary or pectoral adenopathy. Left upper body: No supraclavicular, axillary or pectoral adenopathy. Skin:     General: Skin is warm and dry. Coloration: Skin is not jaundiced or pale. Findings: No rash.       Comments: Patient continues to tan in tanning bed   Neurological:      General: No focal deficit present. Mental Status: She is alert and oriented to person, place, and time. Cranial Nerves: No cranial nerve deficit. Psychiatric:         Mood and Affect: Mood normal.         Behavior: Behavior normal.         Thought Content: Thought content normal.       Lab Results   Component Value Date    HGB 14.5 02/01/2021    HCT 45.7 02/01/2021     02/01/2021    K 4.0 02/01/2021     02/01/2021    CREATININE 0.9 02/01/2021    BUN 17 02/01/2021    CO2 28 02/01/2021        LOCATION: [place]       PROCEDURE: [facility] ABILIO DIGITAL DIAGNOSTIC BILATERAL, [facility] US EXTREMITY RIGHT NON VASC LIMITED       CLINICAL INFORMATION: Personal history of malignant neoplasm of breast . Tomosynthesis. PATIENT MEDICAL HISTORY: Medical history includes breast cancer. FAMILY HISTORY: No relevant family history has been documented for this patient. RISK VALUES: [facility] n/a%, [facility]: n/a%       COMPARISON: 2/10/2021, 1/22/2021, 1/7/2021, 1/4/2021, 1/2/2020, 12/31/2018       [facility] ABILIO DIGITAL DIAGNOSTIC BILATERAL:       TECHNIQUE: Bilateral CC and MLO views were obtained each breast. Tomosynthesis was used. CAD was used. BREAST COMPOSITION: The tissue of the breast(s) is heterogeneously dense. This may lower the sensitivity of mammography. FINDINGS:        There is no mammographic abnormality at the site of the palpable concern. Targeted ultrasound evaluation of the right axilla is reported below No significant masses, calcifications, or other findings are seen in the breast(s). Postoperative findings are noted within the right breast.           [facility] US EXTREMITY RIGHT NON VASC LIMITED:       TECHNIQUE: Targeted ultrasound of the right breast was performed. Grayscale images and color images of the real-time examination were reviewed. The axilla was also imaged.        FINDINGS:        There is a superficial hyperechoic area with indistinct margins within the right axilla measuring 1.2 x 0.6 x 1.2 cm. This could represent a complex sebaceous cyst. However, recommend 6 month follow-up right axillary ultrasound to document stability or    resolution. Impression   1. There is a superficial hyperechoic area with indistinct margins within the right axilla measuring 1.2 x 0.6 x 1.2 cm. This could represent a complex sebaceous cyst. However, recommend 6 month follow-up right axillary ultrasound to document stability    or resolution. The patient was notified and a result letter will be sent to the patient. She will also receive a reminder 1 month prior to her follow-up exam.                   BI-RADS CATEGORY 3: PROBABLY BENIGN FINDING. Management Recommendation: Short interval follow-up or continued surveillance mammography. EXAM: DEXA BONE DENSITY AXIAL SKELETON        Bone Mineral Density       HISTORY: 61 years, Female, Postmenopausal.       COMPARISON: 12/31/2018. FINDINGS:   Bone densitometry has been performed using a Hologic bone densitometer. This evaluation includes the lumbar spine and both hips. Current exam:       Lumbar Spine: L1-L4   Bone mineral density (gm/cm2): 0.925, T-score: -1.1, Z-score: 0.6, This qualifies as osteopenia. Femoral Neck Left:    Bone mineral density (gm/cm2): 0.666, T-score: -1.7, Z-score: -0.2, This qualifies as osteopenia. Femoral Neck Right:    Bone mineral density (gm/cm2): 0.705, T-score: -1.3, Z-score: 0.2, This qualifies as osteopenia. Total Hip Left:    Bone mineral density (gm/cm2): 0.854, T-score: -0.7, Z-score: 0.5, This qualifies as normal bone mineral density. Total Hip Right:   Bone mineral density (gm/cm2): 0.856, T-score: -0.7, Z-score: 0.5, This qualifies as normal bone mineral density. Prior exam:        Lumbar Spine: L1-L4   Bone mineral density (gm/cm2): 0.899, T-score: -1.3, Z-score: 0.2, This qualifies as osteopenia.        Femoral Neck Left:    Bone mineral density (gm/cm2): 0.675, T-score: -1.6, Z-score: -0.2, This qualifies as osteopenia. Femoral Neck Right:    Bone mineral density (gm/cm2): 0.689, T-score: -1.4, Z-score: -0.1, This qualifies as osteopenia. Total Hip Left:    Bone mineral density (gm/cm2): 0.833, T-score: -0.9, Z-score: 0.1, This qualifies as normal bone mineral density. Total Hip Right:   Bone mineral density (gm/cm2): 0.826, T-score: -1.0, Z-score: 0.1, This qualifies as normal bone mineral density. Prior spine BMD 0.899, T score -1.3   BMD changes versus previous is 2.9% in the spine. This is based on dissimilar scan types or analysis methods. Prior mean value for the hip(s) BMD 0.682, T score -1.5   BMD change versus previous is 0.5% for the hips using the mean. This is based on dissimilar scan types or analysis methods. Impression   This qualifies as osteopenia based on the World Health Organization criteria. The patient is at a medium risk for fracture. 10 year probability of major osteoporotic fracture: 17%   10 year probability of hip fracture: 1.0%       . Recommendations:   Therapy recommendations need to be tailored to each individual patient. Using the [facility]) FRAX absolute fracture algorithm, the [address] [place] [address] recommends beginning pharmacological therapy in postmenopausal women    and men over the age of 48 with a 8 year probability of a hip fracture of >3% OR with the 10 year probability of a major osteoporotic fracture of >20%. Please reconsider testing based on risk factors. Currently, Medicare will only reimburse for a central DEXA examination every two years, unless the patient is on chronic glucocorticoid therapy. **This report has been created using voice recognition software. It may contain minor errors which are inherent in voice recognition technology. **       Final report electronically signed by Dr. Alma Pal on 1/27/2022 7:31 AM            LOCATION: [place]       PROCEDURE: [facility] US EXTREMITY RIGHT NON VASC LIMITED       CLINICAL INFORMATION: Follow up, Sebaceous cyst . Palpable area in the    right axilla seen 6 months ago. Follow-up. COMPARISON: Ultrasound and mammogram 1/26/2022. TECHNIQUE: Targeted ultrasound of the right axilla was performed. Grayscale images and color images of the real-time examination were    reviewed. FINDINGS:        The previously visualized area just deep to the skin is no longer present. This area has resolved. The tissues appear normal. There is a normal lymph    node in the right axilla. This has a thin cortex and a fatty krissy. Impression   No sonographic evidence of malignancy. Normal-appearing right axilla. Return to yearly screening mammography is recommended. A result letter will be sent to the patient. She will also receive a    reminder 1 month prior to her next mammogram.                   BI-RADS CATEGORY 1: NEGATIVE. Management Recommendation: Routine annual screening mammography. **This report has been created using voice recognition software. It may    contain minor errors which are inherent in voice recognition    technology. **.**       Final report electronically signed by Dr. Alma Pal on 8/1/2022 10:10    AM               [facility] ABILIO DIGITAL DIAGNOSTIC BILATERAL (Order [id number])  Narrative   LOCATION: [place]       PROCEDURE: [facility] ABILIO DIGITAL DIAGNOSTIC BILATERAL, [facility] US EXTREMITY RIGHT NON VASC LIMITED       CLINICAL INFORMATION: Personal history of malignant neoplasm of breast . Tomosynthesis. PATIENT MEDICAL HISTORY: Medical history includes breast cancer. FAMILY HISTORY: No relevant family history has been documented for this patient.        RISK VALUES: [place] 10yr.: n/a%, [place] life: n/a%       COMPARISON: 2/10/2021, 1/22/2021, 1/7/2021, 1/4/2021, 1/2/2020, 12/31/2018       [facility] ABILIO DIGITAL DIAGNOSTIC BILATERAL:       TECHNIQUE: Bilateral CC and MLO views were obtained each breast. Tomosynthesis was used. CAD was used. BREAST COMPOSITION: The tissue of the breast(s) is heterogeneously dense. This may lower the sensitivity of mammography. FINDINGS:        There is no mammographic abnormality at the site of the palpable concern. Targeted ultrasound evaluation of the right axilla is reported below No significant masses, calcifications, or other findings are seen in the breast(s). Postoperative findings are noted within the right breast.           [facility] US EXTREMITY RIGHT NON VASC LIMITED:       TECHNIQUE: Targeted ultrasound of the right breast was performed. Grayscale images and color images of the real-time examination were reviewed. The axilla was also imaged. FINDINGS:        There is a superficial hyperechoic area with indistinct margins within the right axilla measuring 1.2 x 0.6 x 1.2 cm. This could represent a complex sebaceous cyst. However, recommend 6 month follow-up right axillary ultrasound to document stability or    resolution. Impression   1. There is a superficial hyperechoic area with indistinct margins within the right axilla measuring 1.2 x 0.6 x 1.2 cm. This could represent a complex sebaceous cyst. However, recommend 6 month follow-up right axillary ultrasound to document stability    or resolution. The patient was notified and a result letter will be sent to the patient. She will also receive a reminder 1 month prior to her follow-up exam.                LOCATION: [place]       PROCEDURE: [facility] US EXTREMITY RIGHT NON VASC LIMITED       CLINICAL INFORMATION: Follow up, Sebaceous cyst . Palpable area in the    right axilla seen 6 months ago. Follow-up. COMPARISON: Ultrasound and mammogram 1/26/2022. TECHNIQUE: Targeted ultrasound of the right axilla was performed.     Grayscale images and color images of the real-time examination were    reviewed. FINDINGS:        The previously visualized area just deep to the skin is no longer present. This area has resolved. The tissues appear normal. There is a normal lymph    node in the right axilla. This has a thin cortex and a fatty krissy. Impression   No sonographic evidence of malignancy. Normal-appearing right axilla. Return to yearly screening mammography is recommended. A result letter will be sent to the patient. She will also receive a    reminder 1 month prior to her next mammogram.                   BI-RADS CATEGORY 1: NEGATIVE. Management Recommendation: Routine annual screening mammography. **This report has been created using voice recognition software. It may    contain minor errors which are inherent in voice recognition    technology. **.**       Final report electronically signed by Dr. Suleiman Bui on 8/1/2022 10:10    AM           New imaging reviewed.

## 2022-10-18 ENCOUNTER — HOSPITAL ENCOUNTER (OUTPATIENT)
Dept: RADIATION ONCOLOGY | Age: 67
Discharge: HOME | End: 2022-10-18
Attending: RADIOLOGY
Payer: MEDICARE

## 2022-10-18 VITALS
OXYGEN SATURATION: 98 % | WEIGHT: 153 LBS | BODY MASS INDEX: 30.9 KG/M2 | SYSTOLIC BLOOD PRESSURE: 157 MMHG | RESPIRATION RATE: 16 BRPM | TEMPERATURE: 97.4 F | DIASTOLIC BLOOD PRESSURE: 70 MMHG | HEART RATE: 79 BPM

## 2022-10-18 DIAGNOSIS — D05.11 DUCTAL CARCINOMA IN SITU (DCIS) OF RIGHT BREAST: Primary | ICD-10-CM

## 2022-10-18 PROCEDURE — 99213 OFFICE O/P EST LOW 20 MIN: CPT | Performed by: RADIOLOGY

## 2022-10-18 PROCEDURE — 99212 OFFICE O/P EST SF 10 MIN: CPT | Performed by: RADIOLOGY

## 2022-10-18 ASSESSMENT — ENCOUNTER SYMPTOMS
SHORTNESS OF BREATH: 0
ABDOMINAL PAIN: 0
COUGH: 0
RECTAL PAIN: 0
BACK PAIN: 0
DIARRHEA: 0
NAUSEA: 0
BLOOD IN STOOL: 0
TROUBLE SWALLOWING: 0

## 2022-10-18 NOTE — PROGRESS NOTES
[address] [facility] [address], [address]        [place],  [facility]        Anupam Dunner: [phone]        F: [phone]       [email or web address]            FOLLOW UP NOTE    Date of Service: 10/18/2022  Patient ID: Melani Rogers   : 1955  MRN: [id number]   Acct Number: [de-identified]       DATE OF SERVICE: 10/18/2022   LOCATION: [facility]  PROVIDER: Beatriz Diego MD MS    FOLLOW UP PHYSICIANS: Dr. Lay Neil ([facility]) Dr. Claudia Jackson (Surgery)    ASSESSMENT AND PLAN:  Cancer Staging  Ductal carcinoma in situ (DCIS) of right breast  Staging form: Breast, AJCC 8th Edition  - Pathologic stage from 3/15/2021: Stage 0 (pTis (DCIS), pN0, cM0, G3, ER+, WA+, HER2: Unknown) - Signed by Angelita Estrella MD on 3/15/2021    Ms. Cornelia Cardona presents today for follow-up for DCIS of the right breast. She appears to be doing well, without evidence of recurrent or metastatic disease. She reports hot flashes and joint pain since starting on the anastrozole. She reports dry eye, as well as her hair turning \"green\", more dry, and thinning. She denies recent breast/axillary lumps, nipple discharge or inversion, breast skin changes/swelling/pain, arm mobility issues and swelling, as well as fatigue, unintentional weight loss, fevers, headaches, other pain, shortness of breath, cough, changes in bowel/bladder habits, chest pain, other pain. No concerning findings on clinical breast exam today. She continues to follow with medical oncology, as well as surgery. Her most recent bilateral mammogram was in 2022, with a 6 month follow up ultrasound due to a right axillary \"lump\" she had noticed; this ultrasound in August resulted as negative for malignancy. We will order a bilateral mammogram to be completed in 2023. The patient will return to clinic in 4 months, following mammography, or sooner if clinically indicated.  They have our clinic number to call with any questions or concerns if needed. Thank you for allowing us to be a part of their care. HPI:    3/21/22 As per previous radiation oncology note,    Chestine Barthel underwent screening mammography and had a new cluster of calcifications in the right breast upper outer aspect middle depth. She had scattered benign calcifications in both breasts. Diagnostic mammography was performed and revealed clustered punctate calcifications in the right breast upper outer aspect middle depth. There were no other suspicious abnormalities seen. She underwent a stereotactic core biopsy. Pathology revealed ductal carcinoma in situ, intermediate nuclear grade, solid type with central necrosis and associated microcalcifications. Estrogen receptors were 100% positive progesterone receptors were 5% positive. She was then seen by surgery for evaluation and treatment recommendations. She was agreeable to proceeding with breast conservation therapy and underwent a lumpectomy on 02/10/21 with pathology consistent with high grade DCIS pTisNX with close margins (<2mm). She then underwent a re-resection of disease on 21 with clear margins. IMAGIN/4/21 [facility] SCREEN B/L  IMPRESSION: Mammogram BI-RADS: Category 0: Incomplete: Needs    Additional Imaging Evaluation       1. The new clustered calcifications in the right breast appear    indeterminate. Spot magnification and lateromedial views are    recommended. 2. The patient has been or will be contacted. 21 [facility] DIAGNOSTIC RIGHT  IMPRESSION: Mammogram BI-RADS: Category 4A: Suspicious    Abnormality: Low suspicion for malignancy       1. The clustered punctate calcifications in the right breast    appear to have a low suspicion for malignancy. A stereotactic    biopsy is recommended. 2. The results were reviewed with the patient and the biopsy was    scheduled.        22 [facility] DIAGNOSTIC B/L  ISAIAS U/S EXTREMITY RIGHT  FINDINGS:        There is no mammographic abnormality at the site of the palpable concern. Targeted ultrasound evaluation of the right axilla is reported below No significant masses, calcifications, or other findings are seen in the breast(s). Impression   1. There is a superficial hyperechoic area with indistinct margins within the right axilla measuring 1.2 x 0.6 x 1.2 cm. This could represent a complex sebaceous cyst. However, recommend 6 month follow-up right axillary ultrasound to document stability    or resolution. The patient was notified and a result letter will be sent to the patient. She will also receive a reminder 1 month prior to her follow-up exam.                   BI-RADS CATEGORY 3: PROBABLY BENIGN FINDING.       8/1/22 [facility] U/S EXTREMITY RIGHT  Impression   No sonographic evidence of malignancy. Normal-appearing right axilla. Return to yearly screening mammography is recommended. A result letter will be sent to the patient. She will also receive a    reminder 1 month prior to her next mammogram.                   BI-RADS CATEGORY 1: NEGATIVE. SURGERY:    1/22/21 RIGHT BREAST BIOPSY  Addendum report 1/25/21     FINAL DIAGNOSIS:   A, B. Right breast, UOQ calcs \"A\" and \"B\", tribell clip, biopsies:       Ductal carcinoma in situ, intermediate nuclear grade, solid type       with central necrosis and associated microcalcifications. BREAST BIOMARKERS 1/25/21*   Estrogen Receptor: (Clone SP1), [employer]       Positive 100% of cells, strong intensity     Progesterone Receptor: (Clone 1E2), [employer]       Positive 5% of cells, intermediate intensity     External Controls:  Adequate   Internal Controls:  Adequate   Standard Assay Conditions:  Met       2/10/21 RIGHT BREAST LUMPECTOMY  FINAL DIAGNOSIS:   A. Breast, right, lumpectomy:       Ductal carcinoma in situ, nuclear grade 3, solid and cribriform       types, with comedonecrosis and microcalcifications, pTis (DCIS) NX. See microscopic for margin status. Changes consistent with previous biopsy site. B. Breast, right, new skin caudal margin, excision:    Fibrocystic changes including dense fibrosis and microcyst formation. Fat necrosis. Negative for malignancy. Excision (less than total mastectomy)     Specimen Laterality   Right     + Tumor Site   + Upper outer quadrant     Size (Extent) of DCIS   Estimated size (extent) of DCIS is at least (millimeters) 12 mm       + Additional dimensions (millimeters): 9 mm   + Number of blocks with DCIS: 11   + Number of blocks examined: 12     Histologic Type   Ductal carcinoma in situ   Note: Immunohistochemistry for p63 is performed on blocks A5, A6, and   A7 with adequate controls. The p63 highlights intact myoepithelial   cells. E-cadherin is performed on block A5 with adequate controls. E   cadherin is intact consistent with ductal carcinoma in situ.     + Architectural Patterns (select all that apply)   + Comedo   + Cribriform   + Solid     Nuclear Grade   Grade III (high)     Necrosis   Present, central (expansive \"comedo\" necrosis)     Margins   Uninvolved by DCIS    Distance from closest margin (millimeters):       Less than 1 mm       Specify closest margin (required only if <10mm): Deep, caudal,   cranial       + Distance from other margins (specify millimeter distance       including greater than if appropriate):        Medial: 2 mm   Note: The skin margin inked blue is positive on specimen A. However,   the new skin caudal margin is negative in specimen B.      Regional Lymph Nodes   No lymph nodes submitted or found     Pathologic Stage Classification (pTNM, AJCC 8th Edition)   Primary Tumor  (pT)   pTis (DCIS): Ductal carcinoma in situ     Regional Lymph Nodes (pN)   Category (pN)   pNX: Regional lymph nodes cannot be assessed (eg, not removed for             pathological study or previously removed)     + Additional Pathologic Findings   + Specify: Fibrocystic changes including apocrine metaplasia, microcyst   formation, and dense fibrosis. Changes consistent with previous biopsy site.     + Ancillary Studies   + Breast Biomarker Testing Performed on Previous Biopsy: Case number   [id number]     + Estrogen Receptor (ER)   + Positive 100%     + Progesterone Receptor (PgR)   + Positive 5%     + Microcalcifications (select all that apply)   +  Present in DCIS   +  Present in nonneoplastic tissue       3/3/21 RIGHT BREAST RESECTION  FINAL DIAGNOSIS:   A. Right breast, new deep margin, resection:    Residual ductal carcinoma in situ, Grade 2-3 nuclei, solid and comedo   pattern with microcalcifications. Previous biopsy site changes. DCIS is 1mm to deep margin and lateral margins. Lateral margin is free of neoplasia. B.  Right breast, new caudal margin, resection:    Negative for malignancy. Previous biopsy site changes. C.  Right breast, new skin/cranial margin, resection:    Negative for malignancy. Previous biopsy site changes. RADIATION:    Treatment Course Number: 1    Treatment Site (s) Modality Dose (cGy) Fractions Elapsed Days   Right breast 3-D Conformal 4256 16 21   Right breast lump boost 3-D Conformal 1000 4 3     Cumulative Dose: 5256 cGy    Radiation therapy delivered under the care of Dr. Carol Cespedes MD MS ([facility])       INTERVAL HISTORY:  Michael Keller is a 74 y.o. female is presenting today for regularly scheduled follow up regarding the above mentioned oncologic history. Review of Systems   Constitutional:  Negative for activity change, appetite change, fatigue and unexpected weight change. HENT:  Negative for ear pain and trouble swallowing. Respiratory:  Negative for cough and shortness of breath. Cardiovascular:  Negative for chest pain and leg swelling. Gastrointestinal:  Negative for abdominal pain, blood in stool, diarrhea, nausea and rectal pain.    Genitourinary:  Negative for dysuria, frequency, hematuria and urgency. Musculoskeletal:  Positive for joint swelling. Negative for arthralgias, back pain and myalgias. Skin:  Negative for rash and wound. Neurological:  Negative for dizziness, weakness, light-headedness, numbness and headaches. Psychiatric/Behavioral:  Negative for confusion. A complete review of systems was performed and found to be negative except as presented above. CHAPERONE: Karen Ross PA-C    PAIN: 0/10    LABORATORY STUDIES:  Onc labs: No results found for: PSA, CEA, LDH, AFP    MEDICATIONS:   Current Outpatient Medications   Medication Sig Dispense Refill    anastrozole (ARIMIDEX) 1 MG tablet TAKE 1 TABLET BY MOUTH EVERY DAY 90 tablet 1    Triamterene-HCTZ (DYAZIDE PO) Take by mouth      Misc Natural Products (CALCIUM PLUS ADVANCED PO) Take by mouth      Multiple Vitamins-Minerals (VITAMIN D3 COMPLETE PO) Take by mouth      METFORMIN HCL PO Take 500 mg by mouth 4 times daily      pravastatin (PRAVACHOL) 20 MG tablet Take 20 mg by mouth daily. Levothyroxine Sodium 75 MCG CAPS Take  by mouth Daily. aspirin 81 MG tablet Take 81 mg by mouth daily. No current facility-administered medications for this encounter. PHYSICAL EXAMINATION:  VITAL SIGNS: BP (!) 157/70   Pulse 79   Temp 97.4 °F (36.3 °C) (Infrared)   Resp 16   Wt 153 lb (69.4 kg)   SpO2 98%   BMI 30.90 kg/m²     ECO - Asymptomatic (Fully active, able to carry on all pre-disease activities without restriction)    Physical Exam  Constitutional:       General: She is not in acute distress. Appearance: Normal appearance. HENT:      Head: Normocephalic and atraumatic. Pulmonary:      Effort: Pulmonary effort is normal. No respiratory distress. Chest:   Breasts:     Right: No swelling, bleeding, inverted nipple, mass, nipple discharge, skin change or tenderness. Left: No swelling, bleeding, inverted nipple, mass, nipple discharge, skin change or tenderness. Comments: Firmness in right breast, likely scar tissue  Abdominal:      General: Abdomen is flat. Lymphadenopathy:      Upper Body:      Right upper body: No axillary adenopathy. Left upper body: No axillary adenopathy. Neurological:      Mental Status: She is alert and oriented to person, place, and time. Electronically signed by Hermila Voss MD MS on 10/18/22 at 8:16 AM EDT     ATTESTATION: 20 minutes were spent with the patient at today's visit reviewing pertinent information related to their oncologic diagnosis, including any recent labs, imaging, follow ups and plan of care going forward.     CC:Dr. Solis Espinoza ([facility]) Dr. Sawyer Edouard (Surgery)  ACC:[facility]

## 2022-11-29 ENCOUNTER — OFFICE VISIT (OUTPATIENT)
Dept: ONCOLOGY | Age: 67
End: 2022-11-29
Payer: MEDICARE

## 2022-11-29 ENCOUNTER — HOSPITAL ENCOUNTER (OUTPATIENT)
Dept: INFUSION THERAPY | Age: 67
Discharge: HOME | End: 2022-11-29
Payer: MEDICARE

## 2022-11-29 VITALS
BODY MASS INDEX: 30.44 KG/M2 | HEART RATE: 84 BPM | SYSTOLIC BLOOD PRESSURE: 124 MMHG | DIASTOLIC BLOOD PRESSURE: 78 MMHG | OXYGEN SATURATION: 99 % | WEIGHT: 151 LBS | RESPIRATION RATE: 16 BRPM | HEIGHT: 59 IN | TEMPERATURE: 97.9 F

## 2022-11-29 VITALS
RESPIRATION RATE: 16 BRPM | TEMPERATURE: 97.9 F | SYSTOLIC BLOOD PRESSURE: 124 MMHG | HEART RATE: 84 BPM | DIASTOLIC BLOOD PRESSURE: 78 MMHG

## 2022-11-29 DIAGNOSIS — Z79.811 PROPHYLACTIC USE OF ANASTROZOLE (ARIMIDEX): ICD-10-CM

## 2022-11-29 DIAGNOSIS — Z08 ENCOUNTER FOR FOLLOW-UP SURVEILLANCE OF DUCTAL CARCINOMA IN SITU (DCIS) OF BREAST: ICD-10-CM

## 2022-11-29 DIAGNOSIS — Z86.000 ENCOUNTER FOR FOLLOW-UP SURVEILLANCE OF DUCTAL CARCINOMA IN SITU (DCIS) OF BREAST: ICD-10-CM

## 2022-11-29 DIAGNOSIS — D05.11 DUCTAL CARCINOMA IN SITU (DCIS) OF RIGHT BREAST: Primary | ICD-10-CM

## 2022-11-29 PROCEDURE — G9899 SCRN MAM PERF RSLTS DOC: HCPCS | Performed by: NURSE PRACTITIONER

## 2022-11-29 PROCEDURE — 1036F TOBACCO NON-USER: CPT | Performed by: NURSE PRACTITIONER

## 2022-11-29 PROCEDURE — 99211 OFF/OP EST MAY X REQ PHY/QHP: CPT

## 2022-11-29 PROCEDURE — G8484 FLU IMMUNIZE NO ADMIN: HCPCS | Performed by: NURSE PRACTITIONER

## 2022-11-29 PROCEDURE — 3017F COLORECTAL CA SCREEN DOC REV: CPT | Performed by: NURSE PRACTITIONER

## 2022-11-29 PROCEDURE — 1123F ACP DISCUSS/DSCN MKR DOCD: CPT | Performed by: NURSE PRACTITIONER

## 2022-11-29 PROCEDURE — G8399 PT W/DXA RESULTS DOCUMENT: HCPCS | Performed by: NURSE PRACTITIONER

## 2022-11-29 PROCEDURE — 99214 OFFICE O/P EST MOD 30 MIN: CPT | Performed by: NURSE PRACTITIONER

## 2022-11-29 PROCEDURE — G8417 CALC BMI ABV UP PARAM F/U: HCPCS | Performed by: NURSE PRACTITIONER

## 2022-11-29 PROCEDURE — 1090F PRES/ABSN URINE INCON ASSESS: CPT | Performed by: NURSE PRACTITIONER

## 2022-11-29 PROCEDURE — G8427 DOCREV CUR MEDS BY ELIG CLIN: HCPCS | Performed by: NURSE PRACTITIONER

## 2022-11-29 NOTE — PATIENT INSTRUCTIONS
Return to clinic in 6 mos to see Agatha Bethea due in [address] already in under another provider  Can try evening primrose oil supplement or Vitamin E supplement for hot flashes  Can try glucosamine chondroitin or tumeric for joint pain  Ask PCP about parathyroid hormone levels-can cause high calcium levels and hair changes

## 2022-12-12 ENCOUNTER — NURSE ONLY (OUTPATIENT)
Dept: LAB | Age: 67
End: 2022-12-12

## 2022-12-27 LAB — CYTOLOGY THIN PREP PAP: NORMAL

## 2023-01-27 ENCOUNTER — HOSPITAL ENCOUNTER (OUTPATIENT)
Dept: WOMENS IMAGING | Age: 68
Discharge: HOME | End: 2023-01-27
Payer: MEDICARE

## 2023-01-27 DIAGNOSIS — D05.11 DUCTAL CARCINOMA IN SITU (DCIS) OF RIGHT BREAST: ICD-10-CM

## 2023-01-27 PROCEDURE — 77066 DX MAMMO INCL CAD BI: CPT

## 2023-02-10 RX ORDER — ANASTROZOLE 1 MG/1
TABLET ORAL
Qty: 90 TABLET | Refills: 0 | Status: SHIPPED | OUTPATIENT
Start: 2023-02-10

## 2023-02-24 ENCOUNTER — HOSPITAL ENCOUNTER (OUTPATIENT)
Dept: RADIATION ONCOLOGY | Age: 68
Discharge: HOME | End: 2023-02-24
Attending: RADIOLOGY
Payer: MEDICARE

## 2023-02-24 VITALS
TEMPERATURE: 97.3 F | BODY MASS INDEX: 29.89 KG/M2 | HEART RATE: 100 BPM | WEIGHT: 148 LBS | DIASTOLIC BLOOD PRESSURE: 65 MMHG | SYSTOLIC BLOOD PRESSURE: 145 MMHG | OXYGEN SATURATION: 98 % | RESPIRATION RATE: 16 BRPM

## 2023-02-24 PROCEDURE — 99213 OFFICE O/P EST LOW 20 MIN: CPT

## 2023-02-24 PROCEDURE — 99212 OFFICE O/P EST SF 10 MIN: CPT | Performed by: RADIOLOGY

## 2023-02-24 ASSESSMENT — ENCOUNTER SYMPTOMS
BACK PAIN: 0
COUGH: 0
ABDOMINAL PAIN: 0
DIARRHEA: 0
TROUBLE SWALLOWING: 0
SHORTNESS OF BREATH: 0
NAUSEA: 0
BLOOD IN STOOL: 0
RECTAL PAIN: 0

## 2023-02-24 NOTE — PROGRESS NOTES
[address] [facility] ALMITA [address]        KWAME ADLERJAZMINESARAH RICO ANDREA HAMMOND,  [facility]        Anupam Dunner: [phone]        F: [phone]       [email or web address]            FOLLOW UP NOTE    Date of Service: 2023  Patient ID: Melani Rogers   : 1955  MRN: [id number]   Acct Number: [de-identified]       DATE OF SERVICE: 2023   LOCATION: [facility]  PROVIDER: Claudette Campanile, PA-C    FOLLOW UP PHYSICIANS: Dr. Lay Neil ([facility]) Dr. Claudia Jackson (Surgery)    ASSESSMENT AND PLAN:  Cancer Staging  Ductal carcinoma in situ (DCIS) of right breast  Staging form: Breast, AJCC 8th Edition  - Pathologic stage from 3/15/2021: Stage 0 (pTis (DCIS), pN0, cM0, G3, ER+, NH+, HER2: Unknown) - Signed by Angelita Estrella MD on 3/15/2021      - Melani Rogers is a 74 y.o. female who presents today for regularly-scheduled follow-up for her DCIS of the right breast. She underwent right breast lumpectomy and then resection, followed by adjuvant radiation, which was completed on 21  - She appears to be doing well, without clinical evidence of metastatic or recurrent breast cancer  - She denies breast or axillary lumps, nipple discharge or new inversion, and any skin changes, swelling, or pain in the arms or breasts. She also denies sore throat, dysphagia, fatigue, unintentional weight loss, fever, early satiety, shortness of breath, cough, chest pain, changes/blood in urination or bowel movements, headaches, vision changes, numbness/tingling, weakness, bone/joint pain (apart from since beginning Arimidex), abdominal pain, pain otherwise  - No concerning findings on clinical breast exam today  - Recent bilateral mammogram completed on 23 resulted as: BI-RADS 2: Benign  - The patient continues on hormonal therapy, managed by medical oncology  - Continue to follow with medical oncology, surgery, and all other medical providers.   Patient states she is up-to-date with colonoscopies  - In light of her recent tanning bed use and resulting skin damage on the upper chest, discussed proper sun protection and advised against continued tanning bed use  - Assessment and plan made in collaboration with Dr. Vashti Strong    The patient will return to clinic in 6 months or sooner if clinically indicated. They have our clinic number to call with any questions or concerns if needed. Thank you for allowing us to be a part of their care. HPI:  3/21/22 As per previous radiation oncology note,     Severiano Africa underwent screening mammography and had a new cluster of calcifications in the right breast upper outer aspect middle depth. She had scattered benign calcifications in both breasts. Diagnostic mammography was performed and revealed clustered punctate calcifications in the right breast upper outer aspect middle depth. There were no other suspicious abnormalities seen. She underwent a stereotactic core biopsy. Pathology revealed ductal carcinoma in situ, intermediate nuclear grade, solid type with central necrosis and associated microcalcifications. Estrogen receptors were 100% positive progesterone receptors were 5% positive. She was then seen by surgery for evaluation and treatment recommendations. She was agreeable to proceeding with breast conservation therapy and underwent a lumpectomy on 02/10/21 with pathology consistent with high grade DCIS pTisNX with close margins (<2mm). She then underwent a re-resection of disease on 21 with clear margins. IMAGIN/4/21 [facility] SCREEN B/L  IMPRESSION: Mammogram BI-RADS: Category 0: Incomplete: Needs    Additional Imaging Evaluation       1. The new clustered calcifications in the right breast appear    indeterminate. Spot magnification and lateromedial views are    recommended. 2. The patient has been or will be contacted.            21 [facility] DIAGNOSTIC RIGHT  IMPRESSION: Mammogram BI-RADS: Category 4A: Suspicious    Abnormality: Low suspicion for malignancy       1. The clustered punctate calcifications in the right breast    appear to have a low suspicion for malignancy. A stereotactic    biopsy is recommended. 2. The results were reviewed with the patient and the biopsy was    scheduled. 1/26/22 [facility] DIAGNOSTIC B/L  ISAIAS U/S EXTREMITY RIGHT  FINDINGS:        There is no mammographic abnormality at the site of the palpable concern. Targeted ultrasound evaluation of the right axilla is reported below No significant masses, calcifications, or other findings are seen in the breast(s). Impression   1. There is a superficial hyperechoic area with indistinct margins within the right axilla measuring 1.2 x 0.6 x 1.2 cm. This could represent a complex sebaceous cyst. However, recommend 6 month follow-up right axillary ultrasound to document stability    or resolution. The patient was notified and a result letter will be sent to the patient. She will also receive a reminder 1 month prior to her follow-up exam.                   BI-RADS CATEGORY 3: PROBABLY BENIGN FINDING.         8/1/22 [facility] U/S EXTREMITY RIGHT  Impression   No sonographic evidence of malignancy. Normal-appearing right axilla. Return to yearly screening mammography is recommended. A result letter will be sent to the patient. She will also receive a    reminder 1 month prior to her next mammogram.                   BI-RADS CATEGORY 1: NEGATIVE.        1/27/23 [facility]ILIO DIAGNOSTIC B/L  Impression   No mammographic evidence of malignancy. A one year screening mammogram is    recommended. A result letter will be sent to the patient. She will also receive a    reminder 1 month prior to her next mammogram.                   BI-RADS CATEGORY 2: BENIGN FINDINGS. SURGERY:     1/22/21 RIGHT BREAST BIOPSY  Addendum report 1/25/21     FINAL DIAGNOSIS:   A, B.   Right breast, UOQ calcs \"A\" and \"B\", tribell clip, biopsies:       Ductal carcinoma in situ, intermediate nuclear grade, solid type       with central necrosis and associated microcalcifications. BREAST BIOMARKERS 1/25/21*   Estrogen Receptor: (Clone SP1), [employer]       Positive 100% of cells, strong intensity     Progesterone Receptor: (Clone 1E2), [employer]       Positive 5% of cells, intermediate intensity     External Controls:  Adequate   Internal Controls:  Adequate   Standard Assay Conditions:  Met         2/10/21 RIGHT BREAST LUMPECTOMY  FINAL DIAGNOSIS:   A. Breast, right, lumpectomy:       Ductal carcinoma in situ, nuclear grade 3, solid and cribriform       types, with comedonecrosis and microcalcifications, pTis (DCIS) NX. See microscopic for margin status. Changes consistent with previous biopsy site. B. Breast, right, new skin caudal margin, excision:    Fibrocystic changes including dense fibrosis and microcyst formation. Fat necrosis. Negative for malignancy. Excision (less than total mastectomy)     Specimen Laterality   Right     + Tumor Site   + Upper outer quadrant     Size (Extent) of DCIS   Estimated size (extent) of DCIS is at least (millimeters) 12 mm       + Additional dimensions (millimeters): 9 mm   + Number of blocks with DCIS: 11   + Number of blocks examined: 12     Histologic Type   Ductal carcinoma in situ   Note: Immunohistochemistry for p63 is performed on blocks A5, A6, and   A7 with adequate controls. The p63 highlights intact myoepithelial   cells. E-cadherin is performed on block A5 with adequate controls.   E   cadherin is intact consistent with ductal carcinoma in situ.     + Architectural Patterns (select all that apply)   + Comedo   + Cribriform   + Solid     Nuclear Grade   Grade III (high)     Necrosis   Present, central (expansive \"comedo\" necrosis)     Margins   Uninvolved by DCIS    Distance from closest margin (millimeters):       Less than 1 mm       Specify closest margin (required only if <10mm): Deep, caudal,   cranial       + Distance from other margins (specify millimeter distance       including greater than if appropriate):        Medial: 2 mm   Note: The skin margin inked blue is positive on specimen A. However,   the new skin caudal margin is negative in specimen B. Regional Lymph Nodes   No lymph nodes submitted or found     Pathologic Stage Classification (pTNM, AJCC 8th Edition)   Primary Tumor  (pT)   pTis (DCIS): Ductal carcinoma in situ     Regional Lymph Nodes (pN)   Category (pN)   pNX: Regional lymph nodes cannot be assessed (eg, not removed for             pathological study or previously removed)     + Additional Pathologic Findings   + Specify: Fibrocystic changes including apocrine metaplasia, microcyst   formation, and dense fibrosis. Changes consistent with previous biopsy site.     + Ancillary Studies   + Breast Biomarker Testing Performed on Previous Biopsy: Case number   [id number]     + Estrogen Receptor (ER)   + Positive 100%     + Progesterone Receptor (PgR)   + Positive 5%     + Microcalcifications (select all that apply)   +  Present in DCIS   +  Present in nonneoplastic tissue         3/3/21 RIGHT BREAST RESECTION  FINAL DIAGNOSIS:   A. Right breast, new deep margin, resection:    Residual ductal carcinoma in situ, Grade 2-3 nuclei, solid and comedo   pattern with microcalcifications. Previous biopsy site changes. DCIS is 1mm to deep margin and lateral margins. Lateral margin is free of neoplasia. B.  Right breast, new caudal margin, resection:    Negative for malignancy. Previous biopsy site changes. C.  Right breast, new skin/cranial margin, resection:    Negative for malignancy. Previous biopsy site changes.          RADIATION:     Treatment Course Number: 1     Treatment Site (s) Modality Dose (cGy) Fractions Elapsed Days   Right breast 3-D Conformal 4256 16 21   Right breast lump boost 3-D Conformal 1000 4 3      Cumulative Dose: 5256 cGy     Radiation therapy delivered under the care of Dr. Stephenie Cee MD MS ([facility])          INTERVAL HISTORY:  Bella Piper is a 74 y.o. female is presenting today for regularly scheduled follow up regarding the above mentioned oncologic history. Review of Systems   Constitutional:  Negative for activity change, appetite change, fatigue and unexpected weight change. HENT:  Negative for ear pain and trouble swallowing. Respiratory:  Negative for cough and shortness of breath. Cardiovascular:  Negative for chest pain and leg swelling. Gastrointestinal:  Negative for abdominal pain, blood in stool, diarrhea, nausea and rectal pain. Genitourinary:  Negative for dysuria, frequency, hematuria and urgency. Musculoskeletal:  Positive for arthralgias (since starting Arimidex). Negative for back pain and myalgias. Skin:  Negative for rash and wound. Neurological:  Negative for dizziness, weakness, light-headedness, numbness and headaches. Psychiatric/Behavioral:  Negative for confusion. A complete review of systems was performed and found to be negative except as presented above. CHAPERONE: n/a    PAIN: 0/10    LABORATORY STUDIES:  Onc labs: No results found for: PSA, CEA, LDH, AFP    MEDICATIONS:   Current Outpatient Medications   Medication Sig Dispense Refill    EVENING PRIMROSE OIL PO Take by mouth in the morning and at bedtime      GLUCOSAMINE-CHONDROITIN PO Take by mouth in the morning and at bedtime      anastrozole (ARIMIDEX) 1 MG tablet TAKE 1 TABLET BY MOUTH EVERY DAY 90 tablet 0    Triamterene-HCTZ (DYAZIDE PO) Take by mouth 37.5-25      Misc Natural Products (CALCIUM PLUS ADVANCED PO) Take by mouth (Patient not taking: Reported on 11/29/2022)      Multiple Vitamins-Minerals (VITAMIN D3 COMPLETE PO) Take 2,000 Units by mouth daily      METFORMIN HCL PO Take 500 mg by mouth 4 times daily      pravastatin (PRAVACHOL) 20 MG tablet Take 20 mg by mouth daily.       Levothyroxine Sodium 75 MCG CAPS Take 88 mcg by mouth Daily      aspirin 81 MG tablet Take 81 mg by mouth daily. No current facility-administered medications for this encounter. PHYSICAL EXAMINATION:  VITAL SIGNS: BP (!) 145/65   Pulse 100   Temp 97.3 °F (36.3 °C) (Infrared)   Resp 16   Wt 148 lb (67.1 kg)   SpO2 98%   BMI 29.89 kg/m²     ECO - Asymptomatic (Fully active, able to carry on all pre-disease activities without restriction)    Physical Exam  Constitutional:       General: She is not in acute distress. Appearance: Normal appearance. HENT:      Head: Normocephalic and atraumatic. Pulmonary:      Effort: Pulmonary effort is normal. No respiratory distress. Chest:   Breasts:     Left: No swelling, bleeding, inverted nipple, mass, nipple discharge, skin change or tenderness. Comments: Right: Smaller volume compared to left breast and inverted nipple (per patient, these are chronic, unchanged since surgery). Firmness in right breast, likely scar tissue (unchanged since surgery per patient). No swelling, bleeding, nipple discharge, erythema, dimpling, or other skin changes. No concerning masses or tenderness. Abdominal:      General: Abdomen is flat. Lymphadenopathy:      Upper Body:      Right upper body: No supraclavicular or axillary adenopathy. Left upper body: No supraclavicular or axillary adenopathy. Skin:     Comments: Hyperpigmented skin on mid upper chest as indicative of sun damage   Neurological:      General: No focal deficit present. Mental Status: She is alert and oriented to person, place, and time. Electronically signed by Celso Reaves PA-C on 23     ATTESTATION: 20 minutes were spent with the patient at today's visit reviewing pertinent information related to their oncologic diagnosis, including any recent labs, imaging, follow ups and plan of care going forward. >50% of time spent counseling/coordinating care.     CC:Dr. Dory Kumar ([facility]) Dr. Bola Machuca (Surgery)  ACC:[facility]

## 2023-03-30 ENCOUNTER — OFFICE VISIT (OUTPATIENT)
Dept: SURGERY | Age: 68
End: 2023-03-30
Payer: MEDICARE

## 2023-03-30 VITALS
TEMPERATURE: 97.7 F | BODY MASS INDEX: 29.43 KG/M2 | HEIGHT: 59 IN | WEIGHT: 146 LBS | HEART RATE: 73 BPM | OXYGEN SATURATION: 94 % | SYSTOLIC BLOOD PRESSURE: 136 MMHG | DIASTOLIC BLOOD PRESSURE: 78 MMHG

## 2023-03-30 DIAGNOSIS — M85.80 OSTEOPENIA, UNSPECIFIED LOCATION: ICD-10-CM

## 2023-03-30 DIAGNOSIS — E11.9 TYPE 2 DIABETES MELLITUS WITHOUT COMPLICATION, WITHOUT LONG-TERM CURRENT USE OF INSULIN (HCC): ICD-10-CM

## 2023-03-30 DIAGNOSIS — D05.11 DUCTAL CARCINOMA IN SITU (DCIS) OF RIGHT BREAST: Primary | ICD-10-CM

## 2023-03-30 PROCEDURE — 1036F TOBACCO NON-USER: CPT | Performed by: SURGERY

## 2023-03-30 PROCEDURE — G8399 PT W/DXA RESULTS DOCUMENT: HCPCS | Performed by: SURGERY

## 2023-03-30 PROCEDURE — 99214 OFFICE O/P EST MOD 30 MIN: CPT | Performed by: SURGERY

## 2023-03-30 PROCEDURE — G8417 CALC BMI ABV UP PARAM F/U: HCPCS | Performed by: SURGERY

## 2023-03-30 PROCEDURE — G8484 FLU IMMUNIZE NO ADMIN: HCPCS | Performed by: SURGERY

## 2023-03-30 PROCEDURE — 1123F ACP DISCUSS/DSCN MKR DOCD: CPT | Performed by: SURGERY

## 2023-03-30 PROCEDURE — 3017F COLORECTAL CA SCREEN DOC REV: CPT | Performed by: SURGERY

## 2023-03-30 PROCEDURE — 2022F DILAT RTA XM EVC RTNOPTHY: CPT | Performed by: SURGERY

## 2023-03-30 PROCEDURE — G8427 DOCREV CUR MEDS BY ELIG CLIN: HCPCS | Performed by: SURGERY

## 2023-03-30 PROCEDURE — 1090F PRES/ABSN URINE INCON ASSESS: CPT | Performed by: SURGERY

## 2023-03-30 PROCEDURE — 3046F HEMOGLOBIN A1C LEVEL >9.0%: CPT | Performed by: SURGERY

## 2023-03-30 RX ORDER — TRIAMTERENE AND HYDROCHLOROTHIAZIDE 37.5; 25 MG/1; MG/1
CAPSULE ORAL DAILY
COMMUNITY
Start: 2023-03-01

## 2023-03-30 ASSESSMENT — ENCOUNTER SYMPTOMS
NAUSEA: 0
WHEEZING: 0
COLOR CHANGE: 0
COUGH: 0
ABDOMINAL PAIN: 0
BLOOD IN STOOL: 0
SHORTNESS OF BREATH: 0
SORE THROAT: 0

## 2023-03-30 NOTE — PROGRESS NOTES
revealed ductal carcinoma in situ intermediate nuclear grade with some central necrosis. Estrogen receptor receptors were 100% positive, progesterone receptors were 5% positive. She opted for breast conservation therapy and underwent a right partial mastectomy with preoperative localization. On excision she had residual ductal carcinoma in situ DCIS was 1 mm deep and lateral margin. She underwent a course of external beam radiation therapy after reresection. She finished radiation on May 7.,  2022. she is currently started Arimidex which she is tolerating well she denies significant side effects. She has some tolerable hot flashes. She denies any breast pain or nipple discharge    Interval history: Vandana continues to do well. She denies any breast symptoms. Clinical breast examination today is benign. She denies any new health issues. She denies any breast pain or nipple discharge she has no axillary pain or masses. Mammography diagnostic performed in January, bilateral imaging no evidence of malignancy. Imaging reviewed. Postoperative changes on the right few stable benign appearing calcifications. She reports no new health issues. She is taking no exogenous hormone therapy. Menarche 17. Menopause 55. . Had children in 20's. Did not breastfeed. Stopped estroven, took for 3 to 4 years. No family history of breast or ovarian malignancy.     Past Medical History  Past Medical History:   Diagnosis Date    Active cochlear Meniere's disease     Breast cancer ([facility] [address] 2021    DCIS of right breast    Diabetes mellitus ([address]     type 2    History of therapeutic radiation 2021    Right breast    Seasonal allergies     Sleep apnea     Thyroid disease        Past Surgical History  Past Surgical History:   Procedure Laterality Date    BLADDER SUSPENSION      BREAST LUMPECTOMY Right 2/10/2021    RIGHT BREAST LUMPETOMY, PREOP NEEDLE LOC performed by Sydnee Stanton MD at [facility]

## 2023-03-31 ASSESSMENT — ENCOUNTER SYMPTOMS
RHINORRHEA: 0
SINUS PAIN: 0

## 2023-05-30 ENCOUNTER — HOSPITAL ENCOUNTER (OUTPATIENT)
Dept: INFUSION THERAPY | Age: 68
Discharge: HOME | End: 2023-05-30
Payer: MEDICARE

## 2023-05-30 ENCOUNTER — OFFICE VISIT (OUTPATIENT)
Dept: ONCOLOGY | Age: 68
End: 2023-05-30
Payer: MEDICARE

## 2023-05-30 VITALS
SYSTOLIC BLOOD PRESSURE: 118 MMHG | WEIGHT: 145 LBS | HEART RATE: 84 BPM | DIASTOLIC BLOOD PRESSURE: 64 MMHG | HEIGHT: 59 IN | TEMPERATURE: 98 F | BODY MASS INDEX: 29.23 KG/M2 | OXYGEN SATURATION: 99 % | RESPIRATION RATE: 16 BRPM

## 2023-05-30 VITALS
SYSTOLIC BLOOD PRESSURE: 118 MMHG | TEMPERATURE: 98 F | HEART RATE: 84 BPM | DIASTOLIC BLOOD PRESSURE: 64 MMHG | RESPIRATION RATE: 16 BRPM

## 2023-05-30 DIAGNOSIS — D05.11 DUCTAL CARCINOMA IN SITU (DCIS) OF RIGHT BREAST: Primary | ICD-10-CM

## 2023-05-30 DIAGNOSIS — Z08 ENCOUNTER FOR FOLLOW-UP SURVEILLANCE OF DUCTAL CARCINOMA IN SITU (DCIS) OF BREAST: ICD-10-CM

## 2023-05-30 DIAGNOSIS — Z86.000 ENCOUNTER FOR FOLLOW-UP SURVEILLANCE OF DUCTAL CARCINOMA IN SITU (DCIS) OF BREAST: ICD-10-CM

## 2023-05-30 DIAGNOSIS — Z79.811 PROPHYLACTIC USE OF ANASTROZOLE (ARIMIDEX): ICD-10-CM

## 2023-05-30 PROCEDURE — 1036F TOBACCO NON-USER: CPT | Performed by: NURSE PRACTITIONER

## 2023-05-30 PROCEDURE — G8417 CALC BMI ABV UP PARAM F/U: HCPCS | Performed by: NURSE PRACTITIONER

## 2023-05-30 PROCEDURE — G8399 PT W/DXA RESULTS DOCUMENT: HCPCS | Performed by: NURSE PRACTITIONER

## 2023-05-30 PROCEDURE — 1123F ACP DISCUSS/DSCN MKR DOCD: CPT | Performed by: NURSE PRACTITIONER

## 2023-05-30 PROCEDURE — 1090F PRES/ABSN URINE INCON ASSESS: CPT | Performed by: NURSE PRACTITIONER

## 2023-05-30 PROCEDURE — 99211 OFF/OP EST MAY X REQ PHY/QHP: CPT

## 2023-05-30 PROCEDURE — G8427 DOCREV CUR MEDS BY ELIG CLIN: HCPCS | Performed by: NURSE PRACTITIONER

## 2023-05-30 PROCEDURE — 99213 OFFICE O/P EST LOW 20 MIN: CPT | Performed by: NURSE PRACTITIONER

## 2023-05-30 PROCEDURE — 3017F COLORECTAL CA SCREEN DOC REV: CPT | Performed by: NURSE PRACTITIONER

## 2023-05-30 NOTE — PROGRESS NOTES
[facility] [address] [facility] [address], [address] [facility] [address]  [address] [address]  Dept: [phone]  Dept Fax: [phone]: [phone]      Visit Date:5/30/2023     Juan Hensley is a 74 y.o. female who presents today for:   Chief Complaint   Patient presents with    Follow-up     Ductal carcinoma in situ (DCIS) of right breast        HPI:   Juan Hensley is a 74 y.o. female  who follows in our office with history of DCIS. HPI per previous note in our office:  She underwent screening mammography on January 4, 2021 it showed a new cluster of calcifications in the right breast upper outer aspect middle depth. Diagnostic mammography revealed clustered punctate calcifications in the right breast upper outer aspect middle depth. There were no other suspicious abnormalities seen. She underwent a stereotactic core biopsy on January 22, 2021. Pathology revealed ductal carcinoma in situ, intermediate nuclear grade, solid type with central necrosis and associated microcalcifications. Estrogen receptors 100% positive, progesterone receptor 5% positive. Subsequently she met with the surgeon to discuss her surgical treatment options. She decided to proceed with breast conservation therapy. The patient underwent lumpectomy on February 10, 2021. Final pathology report showed:  A. Breast, right, lumpectomy:       Ductal carcinoma in situ, nuclear grade 3, solid and cribriform       types, with comedonecrosis and microcalcifications, pTis (DCIS) NX. See microscopic for margin status. Changes consistent with previous biopsy site. B. Breast, right, new skin caudal margin, excision:    Fibrocystic changes including dense fibrosis and microcyst formation. Fat necrosis. Negative for malignancy. Due to very close/positive margin the patient underwent reexcision on March 3, 2021. Showed residual and DCIS. New margins were negative. Postsurgical course is unremarkable. Menarche 17.   Menopause

## 2023-08-24 NOTE — PROGRESS NOTES
[address] [facility]           [address] [place] [address], [address] [place] [address]        [facility]: [phone]        F: [phone]       [email or web address]            FOLLOW UP NOTE    Date of Service: 2023  Patient ID: Meredith Crawford   : 1955  MRN: [id number]   Acct Number: [de-identified]       DATE OF SERVICE: 2023   LOCATION: [facility]  PROVIDER: Ximena Hensley PA-C    FOLLOW UP PHYSICIANS: Dr. Arlene Kwong ([facility]) Dr. Precious Lefort (Surgery)    ASSESSMENT AND PLAN:   Cancer Staging   Ductal carcinoma in situ (DCIS) of right breast  Staging form: Breast, AJCC 8th Edition  - Pathologic stage from 3/15/2021: Stage 0 (pTis (DCIS), pN0, cM0, G3, ER+, DC+, HER2: Unknown) - Signed by Kacie Landry MD on 3/15/2021      - Meredith Crawford is a 79 y.o. female who presents today for regularly-scheduled follow-up for her right breast cancer. She underwent adjuvant radiation, which was completed in May 2021  - She appears to be doing well, without clinical evidence of recurrent disease  - She denies breast or axillary lumps, nipple discharge or inversion, and any skin changes, swelling, or pain in the arms or breasts. She does note some mild pain beneath her right breast, which occurs primarily when lying flat in bed, over the last few months, unchanged since. It appears that she noted this symptom at her 23 appointment with Dr. Kingston Garcia as well, who recommended massages.  She points to the area of her rib, inferior to her breast tissue  - No concerning findings on clinical breast exam today  - Recent bilateral mammogram completed on 23 resulted as: BI-RADS 2: Benign  - The patient continues on anti-estrogen therapy, managed by medical oncology  - Continue to follow with medical oncology, surgery, and all other medical providers  - We will order the patient's bilateral mammogram to be completed in 2024; I advised her to let us know should she have

## 2023-08-25 ENCOUNTER — HOSPITAL ENCOUNTER (OUTPATIENT)
Dept: RADIATION ONCOLOGY | Age: 68
Discharge: HOME | End: 2023-08-25
Attending: RADIOLOGY
Payer: MEDICARE

## 2023-08-25 VITALS
SYSTOLIC BLOOD PRESSURE: 145 MMHG | TEMPERATURE: 97.8 F | HEART RATE: 88 BPM | WEIGHT: 147 LBS | RESPIRATION RATE: 16 BRPM | BODY MASS INDEX: 29.69 KG/M2 | OXYGEN SATURATION: 99 % | DIASTOLIC BLOOD PRESSURE: 70 MMHG

## 2023-08-25 DIAGNOSIS — D05.11 DUCTAL CARCINOMA IN SITU (DCIS) OF RIGHT BREAST: Primary | ICD-10-CM

## 2023-08-25 DIAGNOSIS — Z12.31 ENCOUNTER FOR SCREENING MAMMOGRAM FOR MALIGNANT NEOPLASM OF BREAST: ICD-10-CM

## 2023-08-25 PROCEDURE — 99212 OFFICE O/P EST SF 10 MIN: CPT

## 2023-08-25 ASSESSMENT — ENCOUNTER SYMPTOMS
ABDOMINAL PAIN: 0
BLOOD IN STOOL: 0
NAUSEA: 0
SHORTNESS OF BREATH: 0
DIARRHEA: 0
COUGH: 0
BACK PAIN: 0
RECTAL PAIN: 0
TROUBLE SWALLOWING: 0

## 2023-11-27 NOTE — PROGRESS NOTES
[facility] [address] [place] [address] [address]  [address] [facility] [address] [address]  Dept: [phone]  Dept Fax: [phone]: [phone]      Visit Date:12/1/2023     Syeda Rodríguez is a 79 y.o. female who presents today for:   Chief Complaint   Patient presents with    Follow-up     Ductal carcinoma in situ (DCIS) of right breast        HPI:   Syeda Rodríguez is a 79 y.o. female who follows in our office with history of DCIS. HPI per previous note in our office:  She underwent screening mammography on January 4, 2021 it showed a new cluster of calcifications in the right breast upper outer aspect middle depth. Diagnostic mammography revealed clustered punctate calcifications in the right breast upper outer aspect middle depth. There were no other suspicious abnormalities seen. She underwent a stereotactic core biopsy on January 22, 2021. Pathology revealed ductal carcinoma in situ, intermediate nuclear grade, solid type with central necrosis and associated microcalcifications. Estrogen receptors 100% positive, progesterone receptor 5% positive. Subsequently she met with the surgeon to discuss her surgical treatment options. She decided to proceed with breast conservation therapy. The patient underwent lumpectomy on February 10, 2021. Final pathology report showed:  A. Breast, right, lumpectomy:       Ductal carcinoma in situ, nuclear grade 3, solid and cribriform       types, with comedonecrosis and microcalcifications, pTis (DCIS) NX. See microscopic for margin status. Changes consistent with previous biopsy site. B. Breast, right, new skin caudal margin, excision:    Fibrocystic changes including dense fibrosis and microcyst formation. Fat necrosis. Negative for malignancy. Due to very close/positive margin the patient underwent reexcision on March 3, 2021. Showed residual and DCIS. New margins were negative. Postsurgical course is unremarkable. Menarche 17.   Menopause

## 2023-12-01 ENCOUNTER — HOSPITAL ENCOUNTER (OUTPATIENT)
Dept: INFUSION THERAPY | Age: 68
Discharge: HOME | End: 2023-12-01
Payer: MEDICARE

## 2023-12-01 ENCOUNTER — OFFICE VISIT (OUTPATIENT)
Dept: ONCOLOGY | Age: 68
End: 2023-12-01
Payer: MEDICARE

## 2023-12-01 VITALS
WEIGHT: 139.4 LBS | TEMPERATURE: 97 F | OXYGEN SATURATION: 100 % | HEART RATE: 87 BPM | SYSTOLIC BLOOD PRESSURE: 148 MMHG | HEIGHT: 59 IN | BODY MASS INDEX: 28.1 KG/M2 | DIASTOLIC BLOOD PRESSURE: 68 MMHG | RESPIRATION RATE: 20 BRPM

## 2023-12-01 VITALS
HEART RATE: 87 BPM | BODY MASS INDEX: 28.1 KG/M2 | WEIGHT: 139.4 LBS | HEIGHT: 59 IN | OXYGEN SATURATION: 100 % | RESPIRATION RATE: 20 BRPM | TEMPERATURE: 97 F | SYSTOLIC BLOOD PRESSURE: 148 MMHG | DIASTOLIC BLOOD PRESSURE: 68 MMHG

## 2023-12-01 DIAGNOSIS — Z86.000 ENCOUNTER FOR FOLLOW-UP SURVEILLANCE OF DUCTAL CARCINOMA IN SITU (DCIS) OF BREAST: ICD-10-CM

## 2023-12-01 DIAGNOSIS — Z08 ENCOUNTER FOR FOLLOW-UP SURVEILLANCE OF DUCTAL CARCINOMA IN SITU (DCIS) OF BREAST: ICD-10-CM

## 2023-12-01 DIAGNOSIS — Z79.811 PROPHYLACTIC USE OF ANASTROZOLE (ARIMIDEX): Primary | ICD-10-CM

## 2023-12-01 DIAGNOSIS — D05.11 DUCTAL CARCINOMA IN SITU (DCIS) OF RIGHT BREAST: ICD-10-CM

## 2023-12-01 PROCEDURE — 99213 OFFICE O/P EST LOW 20 MIN: CPT | Performed by: NURSE PRACTITIONER

## 2023-12-01 PROCEDURE — 99211 OFF/OP EST MAY X REQ PHY/QHP: CPT

## 2023-12-01 PROCEDURE — 1036F TOBACCO NON-USER: CPT | Performed by: NURSE PRACTITIONER

## 2023-12-01 PROCEDURE — G8399 PT W/DXA RESULTS DOCUMENT: HCPCS | Performed by: NURSE PRACTITIONER

## 2023-12-01 PROCEDURE — G8484 FLU IMMUNIZE NO ADMIN: HCPCS | Performed by: NURSE PRACTITIONER

## 2023-12-01 PROCEDURE — 3017F COLORECTAL CA SCREEN DOC REV: CPT | Performed by: NURSE PRACTITIONER

## 2023-12-01 PROCEDURE — 1090F PRES/ABSN URINE INCON ASSESS: CPT | Performed by: NURSE PRACTITIONER

## 2023-12-01 PROCEDURE — G8427 DOCREV CUR MEDS BY ELIG CLIN: HCPCS | Performed by: NURSE PRACTITIONER

## 2023-12-01 PROCEDURE — 1123F ACP DISCUSS/DSCN MKR DOCD: CPT | Performed by: NURSE PRACTITIONER

## 2023-12-01 PROCEDURE — G8417 CALC BMI ABV UP PARAM F/U: HCPCS | Performed by: NURSE PRACTITIONER

## 2023-12-01 NOTE — PATIENT INSTRUCTIONS
Order in for mammogram due after 1/27-please schedule as same time as DEXA   DEXA scan due after 1/26.   Please schedule as same time as Mammogram   Return to clinic in 6 mos to see Dr. Kin Mixon

## 2024-02-02 ENCOUNTER — HOSPITAL ENCOUNTER (OUTPATIENT)
Dept: WOMENS IMAGING | Age: 69
Discharge: HOME | End: 2024-02-02
Payer: MEDICARE

## 2024-02-02 DIAGNOSIS — D05.11 DUCTAL CARCINOMA IN SITU (DCIS) OF RIGHT BREAST: ICD-10-CM

## 2024-02-02 DIAGNOSIS — Z12.31 ENCOUNTER FOR SCREENING MAMMOGRAM FOR MALIGNANT NEOPLASM OF BREAST: ICD-10-CM

## 2024-02-02 DIAGNOSIS — Z79.811 PROPHYLACTIC USE OF ANASTROZOLE (ARIMIDEX): ICD-10-CM

## 2024-02-02 PROCEDURE — 77080 DXA BONE DENSITY AXIAL: CPT

## 2024-02-02 PROCEDURE — 77063 BREAST TOMOSYNTHESIS BI: CPT

## 2024-03-20 ENCOUNTER — TELEPHONE (OUTPATIENT)
Dept: SURGERY | Age: 69
End: 2024-03-20

## 2024-03-20 NOTE — TELEPHONE ENCOUNTER
Called [facility] and LM on VM asking if pt had a recent Colonoscopy. Asked if she did to fax over results.

## 2024-03-25 PROBLEM — E55.9 VITAMIN D DEFICIENCY: Status: ACTIVE | Noted: 2024-03-25

## 2024-03-25 PROBLEM — G47.30 SLEEP APNEA: Status: ACTIVE | Noted: 2024-03-25

## 2024-03-25 PROBLEM — E11.9 TYPE 2 DIABETES MELLITUS WITHOUT COMPLICATION (HCC): Status: ACTIVE | Noted: 2024-03-25

## 2024-03-25 PROBLEM — E78.2 MIXED HYPERCHOLESTEROLEMIA AND HYPERTRIGLYCERIDEMIA: Status: ACTIVE | Noted: 2024-03-25

## 2024-03-25 PROBLEM — I10 PRIMARY HYPERTENSION: Status: ACTIVE | Noted: 2024-03-25

## 2024-03-25 PROBLEM — E03.9 HYPOTHYROIDISM: Status: ACTIVE | Noted: 2024-03-25

## 2024-03-28 ENCOUNTER — OFFICE VISIT (OUTPATIENT)
Dept: SURGERY | Age: 69
End: 2024-03-28
Payer: MEDICARE

## 2024-03-28 VITALS
RESPIRATION RATE: 18 BRPM | BODY MASS INDEX: 29.64 KG/M2 | SYSTOLIC BLOOD PRESSURE: 124 MMHG | DIASTOLIC BLOOD PRESSURE: 84 MMHG | HEIGHT: 59 IN | TEMPERATURE: 97.3 F | WEIGHT: 147 LBS

## 2024-03-28 DIAGNOSIS — R92.333 HETEROGENEOUSLY DENSE TISSUE OF BOTH BREASTS ON MAMMOGRAPHY: ICD-10-CM

## 2024-03-28 DIAGNOSIS — E11.9 TYPE 2 DIABETES MELLITUS WITHOUT COMPLICATION, WITHOUT LONG-TERM CURRENT USE OF INSULIN (HCC): ICD-10-CM

## 2024-03-28 DIAGNOSIS — M85.80 OSTEOPENIA, UNSPECIFIED LOCATION: ICD-10-CM

## 2024-03-28 DIAGNOSIS — D05.11 DUCTAL CARCINOMA IN SITU (DCIS) OF RIGHT BREAST: Primary | ICD-10-CM

## 2024-03-28 PROCEDURE — 3017F COLORECTAL CA SCREEN DOC REV: CPT | Performed by: SURGERY

## 2024-03-28 PROCEDURE — 3079F DIAST BP 80-89 MM HG: CPT | Performed by: SURGERY

## 2024-03-28 PROCEDURE — 1123F ACP DISCUSS/DSCN MKR DOCD: CPT | Performed by: SURGERY

## 2024-03-28 PROCEDURE — G8484 FLU IMMUNIZE NO ADMIN: HCPCS | Performed by: SURGERY

## 2024-03-28 PROCEDURE — 3074F SYST BP LT 130 MM HG: CPT | Performed by: SURGERY

## 2024-03-28 PROCEDURE — 3046F HEMOGLOBIN A1C LEVEL >9.0%: CPT | Performed by: SURGERY

## 2024-03-28 PROCEDURE — G8427 DOCREV CUR MEDS BY ELIG CLIN: HCPCS | Performed by: SURGERY

## 2024-03-28 PROCEDURE — 99214 OFFICE O/P EST MOD 30 MIN: CPT | Performed by: SURGERY

## 2024-03-28 PROCEDURE — G8399 PT W/DXA RESULTS DOCUMENT: HCPCS | Performed by: SURGERY

## 2024-03-28 PROCEDURE — 1090F PRES/ABSN URINE INCON ASSESS: CPT | Performed by: SURGERY

## 2024-03-28 PROCEDURE — G8417 CALC BMI ABV UP PARAM F/U: HCPCS | Performed by: SURGERY

## 2024-03-28 PROCEDURE — 2022F DILAT RTA XM EVC RTNOPTHY: CPT | Performed by: SURGERY

## 2024-03-28 PROCEDURE — 1036F TOBACCO NON-USER: CPT | Performed by: SURGERY

## 2024-03-28 ASSESSMENT — ENCOUNTER SYMPTOMS
COUGH: 0
NAUSEA: 0
COLOR CHANGE: 0
SINUS PAIN: 0
BLOOD IN STOOL: 0
RHINORRHEA: 0
SORE THROAT: 0
SHORTNESS OF BREATH: 0
ABDOMINAL PAIN: 0
WHEEZING: 0

## 2024-03-28 NOTE — PROGRESS NOTES
Yanira Monroy MD   General Surgery  Follow up Patient Evaluation in Office  Pt Name: Sandra Forman  Date of Birth 1955   Today's Date: 3/28/2024  Medical Record Number: [id number]  Referring Provider: No ref. provider found  Primary Care Provider: Talib Lara PA-C  Chief Complaint:  Chief Complaint   Patient presents with    1 Year Follow Up     Ductal carcinoma in situ (DCIS) of right breast-on Arimidex-Last seen 3/30/23-Rene and DEXA-2/2/24--Follows Alison Danielle/Dr. Sadler       ASSESSMENT      1. Ductal carcinoma in situ (DCIS) of right breast    2. Osteopenia, unspecified location    3. Type 2 diabetes mellitus without complication, without long-term current use of insulin (HCC)    4. Heterogeneously dense tissue of both breasts on mammography    3.  Type 2 diabetes mellitus   Stage 0   PLANS      1.  Clinical breast examination  today is benign.    2.  Continue anastrozole under guidance of medical oncology.  Patient tolerating well.  5 years of therapy is planned.  Minimal vasomotor symptoms.  3.  Interval mammography independently reviewed.  February 2024 no suspicious findings   4.  Patient agreeable to schedule bilateral breast MRI.  Dense breast tissue  5.  Follow-up surgical clinic in 1 year    7.  Continue oral calcium and vitamin D supplementation.  As well as get regular exercise.  DEXA scan every 2 years.  Recent DEXA scan stable osteopenia  8.  Encourage discontinuance of use of tanning bed.  Increased risk of skin cancer.  SUBJECTIVE   History of present illness:  Sandra is a 68 y.o. year old female who is presenting today in the office for follow-up of ductal carcinoma in situ of the right breast.   Vandana was diagnosed with ductal carcinoma in situ of the right breast after she had abnormal screening mammogram in February 2021.  She had a new cluster of calcifications in the upper outer aspect middle depth.  Diagnostic imaging and stereotactic core biopsy was performed.  Pathology

## 2024-06-18 ENCOUNTER — OFFICE VISIT (OUTPATIENT)
Dept: ONCOLOGY | Age: 69
End: 2024-06-18
Payer: MEDICARE

## 2024-06-18 ENCOUNTER — HOSPITAL ENCOUNTER (OUTPATIENT)
Dept: INFUSION THERAPY | Age: 69
Discharge: HOME | End: 2024-06-18
Payer: MEDICARE

## 2024-06-18 VITALS
HEIGHT: 59 IN | RESPIRATION RATE: 16 BRPM | OXYGEN SATURATION: 99 % | BODY MASS INDEX: 28.83 KG/M2 | HEART RATE: 79 BPM | WEIGHT: 143 LBS | SYSTOLIC BLOOD PRESSURE: 148 MMHG | TEMPERATURE: 97.7 F | DIASTOLIC BLOOD PRESSURE: 74 MMHG

## 2024-06-18 VITALS
TEMPERATURE: 97.7 F | RESPIRATION RATE: 16 BRPM | SYSTOLIC BLOOD PRESSURE: 148 MMHG | DIASTOLIC BLOOD PRESSURE: 74 MMHG | HEART RATE: 79 BPM | OXYGEN SATURATION: 99 %

## 2024-06-18 DIAGNOSIS — M85.89 OSTEOPENIA OF MULTIPLE SITES: Primary | ICD-10-CM

## 2024-06-18 PROBLEM — Z85.3 HISTORY OF RIGHT BREAST CANCER: Status: ACTIVE | Noted: 2024-06-18

## 2024-06-18 PROCEDURE — 1090F PRES/ABSN URINE INCON ASSESS: CPT | Performed by: INTERNAL MEDICINE

## 2024-06-18 PROCEDURE — 3017F COLORECTAL CA SCREEN DOC REV: CPT | Performed by: INTERNAL MEDICINE

## 2024-06-18 PROCEDURE — 3077F SYST BP >= 140 MM HG: CPT | Performed by: INTERNAL MEDICINE

## 2024-06-18 PROCEDURE — 99211 OFF/OP EST MAY X REQ PHY/QHP: CPT

## 2024-06-18 PROCEDURE — 1036F TOBACCO NON-USER: CPT | Performed by: INTERNAL MEDICINE

## 2024-06-18 PROCEDURE — G8417 CALC BMI ABV UP PARAM F/U: HCPCS | Performed by: INTERNAL MEDICINE

## 2024-06-18 PROCEDURE — G8399 PT W/DXA RESULTS DOCUMENT: HCPCS | Performed by: INTERNAL MEDICINE

## 2024-06-18 PROCEDURE — 3078F DIAST BP <80 MM HG: CPT | Performed by: INTERNAL MEDICINE

## 2024-06-18 PROCEDURE — 1123F ACP DISCUSS/DSCN MKR DOCD: CPT | Performed by: INTERNAL MEDICINE

## 2024-06-18 PROCEDURE — G8427 DOCREV CUR MEDS BY ELIG CLIN: HCPCS | Performed by: INTERNAL MEDICINE

## 2024-06-18 PROCEDURE — 99214 OFFICE O/P EST MOD 30 MIN: CPT | Performed by: INTERNAL MEDICINE

## 2024-06-18 NOTE — PROGRESS NOTES
examination.  Radiological imaging to screen for distant recurrence is completed as needed according to patient report of new symptoms that may suggest disease recurrence or metastases.  Symptoms suspicious for recurrence or metastases include:    Constitutional symptoms - anorexia, weight loss, malaise, fatigue, insomnia.  Bone pain  Pulmonary symptoms - persistent cough or dyspnea (at rest or with exertion)  Neurological symptoms - headache, nausea, vomiting, confusion, weakness, numbness/tingling  Gastrointestinal symptoms - RUQ pain, change in bowel/bladder habits, presence of bloody or tarry stools.    No s/s recurrence from today's examination.  No palpable lumps/masses, nipple changes/inversion/discharge, or edema to chest wall/UE.  Continue Arimidex.  Continue surveillance.        6/18/2024;  Reviewed DEXA scan worsening of osteopenia.  This qualifies as osteopenia based on the World Health Organization criteria. The patient is at a medium risk for fracture.  10 year probability of major osteoporotic fracture: 16%  10 year probability of hip fracture: 1.2%    Recommendations:  Therapy recommendations need to be tailored to each individual patient. Using the World Health Organization (WHO) FRAX absolute fracture algorithm, the National Osteoporosis Foundation recommends beginning pharmacological therapy in postmenopausal women and men over the age of 50 with a 10 year probability of a hip fracture of >3% OR with the 10 year probability of a major osteoporotic fracture of >20%.  Requested authorization for Prolia.  Dental clearance form was provided.  Continue anastrozole daily.  Calcium and vitamin D supplements.  (1200 mg/800 IU daily)  Indication in breast cancer pts: Women receiving adjuvant aromatase inhibitor therapy who have baseline BMD T-scores between -1.0 to -2.5 at the lumbar spine, total hip, or femoral neck, and had not experienced fracture. Women were randomized to receive subcutaneous

## 2024-06-18 NOTE — PATIENT INSTRUCTIONS
Return in about 5 weeks (around 7/23/2024). To review MRI breasts.+ treatment visit  Prolia authorization requested.  Please provide dental form

## 2024-07-12 ENCOUNTER — HOSPITAL ENCOUNTER (OUTPATIENT)
Dept: MRI IMAGING | Age: 69
Discharge: HOME | End: 2024-07-12
Attending: SURGERY
Payer: MEDICARE

## 2024-07-12 DIAGNOSIS — R92.333 HETEROGENEOUSLY DENSE TISSUE OF BOTH BREASTS ON MAMMOGRAPHY: ICD-10-CM

## 2024-07-12 DIAGNOSIS — D05.11 DUCTAL CARCINOMA IN SITU (DCIS) OF RIGHT BREAST: ICD-10-CM

## 2024-07-12 LAB — POC CREATININE WHOLE BLOOD: 1 MG/DL (ref 0.5–1.2)

## 2024-07-12 PROCEDURE — 82565 ASSAY OF CREATININE: CPT

## 2024-07-12 PROCEDURE — 6360000004 HC RX CONTRAST MEDICATION: Performed by: SURGERY

## 2024-07-12 PROCEDURE — A9579 GAD-BASE MR CONTRAST NOS,1ML: HCPCS | Performed by: SURGERY

## 2024-07-12 PROCEDURE — C8908 MRI W/O FOL W/CONT, BREAST,: HCPCS

## 2024-07-12 RX ADMIN — GADOTERIDOL 15 ML: 279.3 INJECTION, SOLUTION INTRAVENOUS at 07:51

## 2024-07-18 ENCOUNTER — HOSPITAL ENCOUNTER (OUTPATIENT)
Dept: WOMENS IMAGING | Age: 69
Discharge: HOME | End: 2024-07-18
Attending: RADIOLOGY
Payer: MEDICARE

## 2024-07-18 DIAGNOSIS — R92.8 ABNORMAL MAMMOGRAM: ICD-10-CM

## 2024-07-18 PROCEDURE — 76642 ULTRASOUND BREAST LIMITED: CPT

## 2024-07-22 DIAGNOSIS — R92.8 ABNORMAL MRI, BREAST: Primary | ICD-10-CM

## 2024-08-08 ENCOUNTER — HOSPITAL ENCOUNTER (OUTPATIENT)
Dept: MRI IMAGING | Age: 69
Discharge: HOME | End: 2024-08-08
Payer: MEDICARE

## 2024-08-08 ENCOUNTER — HOSPITAL ENCOUNTER (OUTPATIENT)
Dept: WOMENS IMAGING | Age: 69
Discharge: HOME | End: 2024-08-08
Attending: RADIOLOGY
Payer: MEDICARE

## 2024-08-08 DIAGNOSIS — N63.15 MASS OVERLAPPING MULTIPLE QUADRANTS OF RIGHT BREAST: ICD-10-CM

## 2024-08-08 DIAGNOSIS — R92.8 ABNORMAL MRI, BREAST: ICD-10-CM

## 2024-08-08 PROCEDURE — A9579 GAD-BASE MR CONTRAST NOS,1ML: HCPCS | Performed by: SURGERY

## 2024-08-08 PROCEDURE — 6360000004 HC RX CONTRAST MEDICATION: Performed by: SURGERY

## 2024-08-08 PROCEDURE — 19085 BX BREAST 1ST LESION MR IMAG: CPT

## 2024-08-08 RX ADMIN — GADOTERIDOL 15 ML: 279.3 INJECTION, SOLUTION INTRAVENOUS at 08:19

## 2024-08-08 NOTE — PROGRESS NOTES
[facility]  Pre-Biopsy Assessment      Patient Education    Written information about procedure Yes  right   Procedural steps explained Yes MRI   Post-op potential: bruising, hematoma, pain Yes    Self-care: activity, care of dressing Yes    Patient verbalized understanding Yes    Consent signed and witnessed Yes      Hormone Therapy Status: Anastrozole    Recent Medication: Aspirin Last Dose: 8-3-24                                     Hormone Replacement Therapy: no    Previous Breast Biopsy: yes -     Previous Diagnosis Cancer: yes - breat cancer    Hysterectomy:no    Emotional Status: Nervous    Language or Physical Barriers: n/a    Comments: n/a      Electronically signed by Adeline Ellison on 8/8/2024 at 7:28 AM

## 2024-08-15 ENCOUNTER — HOSPITAL ENCOUNTER (OUTPATIENT)
Dept: INFUSION THERAPY | Age: 69
Discharge: HOME | End: 2024-08-15
Payer: MEDICARE

## 2024-08-15 ENCOUNTER — OFFICE VISIT (OUTPATIENT)
Dept: ONCOLOGY | Age: 69
End: 2024-08-15
Payer: MEDICARE

## 2024-08-15 VITALS
OXYGEN SATURATION: 99 % | HEART RATE: 80 BPM | DIASTOLIC BLOOD PRESSURE: 70 MMHG | TEMPERATURE: 98.5 F | WEIGHT: 146 LBS | SYSTOLIC BLOOD PRESSURE: 104 MMHG | RESPIRATION RATE: 16 BRPM | BODY MASS INDEX: 29.47 KG/M2

## 2024-08-15 VITALS
SYSTOLIC BLOOD PRESSURE: 104 MMHG | TEMPERATURE: 98.5 F | RESPIRATION RATE: 16 BRPM | BODY MASS INDEX: 29.43 KG/M2 | OXYGEN SATURATION: 99 % | HEIGHT: 59 IN | DIASTOLIC BLOOD PRESSURE: 70 MMHG | WEIGHT: 146 LBS | HEART RATE: 80 BPM

## 2024-08-15 DIAGNOSIS — M85.89 OSTEOPENIA OF MULTIPLE SITES: ICD-10-CM

## 2024-08-15 DIAGNOSIS — D05.11 DUCTAL CARCINOMA IN SITU (DCIS) OF RIGHT BREAST: ICD-10-CM

## 2024-08-15 DIAGNOSIS — Z85.3 HISTORY OF RIGHT BREAST CANCER: Primary | ICD-10-CM

## 2024-08-15 DIAGNOSIS — D05.11 DUCTAL CARCINOMA IN SITU (DCIS) OF RIGHT BREAST: Primary | ICD-10-CM

## 2024-08-15 LAB
ALBUMIN SERPL BCG-MCNC: 4.6 G/DL (ref 3.5–5.1)
ALP SERPL-CCNC: 60 U/L (ref 38–126)
ALT SERPL W/O P-5'-P-CCNC: 38 U/L (ref 11–66)
AST SERPL-CCNC: 36 U/L (ref 5–40)
BILIRUB CONJ SERPL-MCNC: < 0.1 MG/DL (ref 0.1–13.8)
BILIRUB SERPL-MCNC: 0.3 MG/DL (ref 0.3–1.2)
BUN BLDP-MCNC: 24 MG/DL (ref 8–26)
CHLORIDE BLD-SCNC: 103 MEQ/L (ref 98–109)
CREAT BLD-MCNC: 0.8 MG/DL (ref 0.5–1.2)
GFR SERPL CREATININE-BSD FRML MDRD: 81 ML/MIN/1.73M2
GLUCOSE BLD-MCNC: 144 MG/DL (ref 70–108)
IONIZED CALCIUM, WHOLE BLOOD: 1.23 MMOL/L (ref 1.12–1.32)
POTASSIUM BLD-SCNC: 4.3 MEQ/L (ref 3.5–4.9)
PROT SERPL-MCNC: 7.5 G/DL (ref 6.1–8)
SODIUM BLD-SCNC: 142 MEQ/L (ref 138–146)
TOTAL CO2, WHOLE BLOOD: 30 MEQ/L (ref 23–33)

## 2024-08-15 PROCEDURE — G8417 CALC BMI ABV UP PARAM F/U: HCPCS | Performed by: INTERNAL MEDICINE

## 2024-08-15 PROCEDURE — 96372 THER/PROPH/DIAG INJ SC/IM: CPT

## 2024-08-15 PROCEDURE — 3078F DIAST BP <80 MM HG: CPT | Performed by: INTERNAL MEDICINE

## 2024-08-15 PROCEDURE — 3017F COLORECTAL CA SCREEN DOC REV: CPT | Performed by: INTERNAL MEDICINE

## 2024-08-15 PROCEDURE — 6360000002 HC RX W HCPCS: Performed by: INTERNAL MEDICINE

## 2024-08-15 PROCEDURE — 1090F PRES/ABSN URINE INCON ASSESS: CPT | Performed by: INTERNAL MEDICINE

## 2024-08-15 PROCEDURE — 36415 COLL VENOUS BLD VENIPUNCTURE: CPT

## 2024-08-15 PROCEDURE — 80076 HEPATIC FUNCTION PANEL: CPT

## 2024-08-15 PROCEDURE — 3074F SYST BP LT 130 MM HG: CPT | Performed by: INTERNAL MEDICINE

## 2024-08-15 PROCEDURE — 1036F TOBACCO NON-USER: CPT | Performed by: INTERNAL MEDICINE

## 2024-08-15 PROCEDURE — 99214 OFFICE O/P EST MOD 30 MIN: CPT | Performed by: INTERNAL MEDICINE

## 2024-08-15 PROCEDURE — G8399 PT W/DXA RESULTS DOCUMENT: HCPCS | Performed by: INTERNAL MEDICINE

## 2024-08-15 PROCEDURE — 99211 OFF/OP EST MAY X REQ PHY/QHP: CPT

## 2024-08-15 PROCEDURE — 80047 BASIC METABLC PNL IONIZED CA: CPT

## 2024-08-15 PROCEDURE — G8427 DOCREV CUR MEDS BY ELIG CLIN: HCPCS | Performed by: INTERNAL MEDICINE

## 2024-08-15 PROCEDURE — 1123F ACP DISCUSS/DSCN MKR DOCD: CPT | Performed by: INTERNAL MEDICINE

## 2024-08-15 RX ORDER — ALBUTEROL SULFATE 90 UG/1
4 AEROSOL, METERED RESPIRATORY (INHALATION) PRN
Status: CANCELLED | OUTPATIENT
Start: 2024-08-15

## 2024-08-15 RX ORDER — ACETAMINOPHEN 325 MG/1
650 TABLET ORAL
OUTPATIENT
Start: 2025-02-09

## 2024-08-15 RX ORDER — ALBUTEROL SULFATE 90 UG/1
4 AEROSOL, METERED RESPIRATORY (INHALATION) PRN
OUTPATIENT
Start: 2025-02-09

## 2024-08-15 RX ORDER — EPINEPHRINE 1 MG/ML
0.3 INJECTION, SOLUTION, CONCENTRATE INTRAVENOUS PRN
Status: CANCELLED | OUTPATIENT
Start: 2024-08-15

## 2024-08-15 RX ORDER — ACETAMINOPHEN 325 MG/1
650 TABLET ORAL
Status: CANCELLED | OUTPATIENT
Start: 2024-08-15

## 2024-08-15 RX ORDER — DIPHENHYDRAMINE HYDROCHLORIDE 50 MG/ML
50 INJECTION INTRAMUSCULAR; INTRAVENOUS
Status: CANCELLED | OUTPATIENT
Start: 2024-08-15

## 2024-08-15 RX ORDER — DIPHENHYDRAMINE HYDROCHLORIDE 50 MG/ML
50 INJECTION INTRAMUSCULAR; INTRAVENOUS
OUTPATIENT
Start: 2025-02-09

## 2024-08-15 RX ORDER — FAMOTIDINE 10 MG/ML
20 INJECTION, SOLUTION INTRAVENOUS
Status: CANCELLED | OUTPATIENT
Start: 2024-08-15

## 2024-08-15 RX ORDER — SODIUM CHLORIDE 9 MG/ML
INJECTION, SOLUTION INTRAVENOUS CONTINUOUS
Status: CANCELLED | OUTPATIENT
Start: 2024-08-15

## 2024-08-15 RX ORDER — SODIUM CHLORIDE 9 MG/ML
INJECTION, SOLUTION INTRAVENOUS CONTINUOUS
OUTPATIENT
Start: 2025-02-09

## 2024-08-15 RX ORDER — ONDANSETRON 2 MG/ML
8 INJECTION INTRAMUSCULAR; INTRAVENOUS
Status: CANCELLED | OUTPATIENT
Start: 2024-08-15

## 2024-08-15 RX ORDER — ONDANSETRON 2 MG/ML
8 INJECTION INTRAMUSCULAR; INTRAVENOUS
OUTPATIENT
Start: 2025-02-09

## 2024-08-15 RX ORDER — EPINEPHRINE 1 MG/ML
0.3 INJECTION, SOLUTION INTRAMUSCULAR; SUBCUTANEOUS PRN
OUTPATIENT
Start: 2025-02-09

## 2024-08-15 RX ADMIN — DENOSUMAB 60 MG: 60 INJECTION SUBCUTANEOUS at 09:42

## 2024-08-15 NOTE — DISCHARGE INSTRUCTIONS
Please contact your Oncologist if you have any questions regarding the Prolia injection that you received today.      Please call if you experience any of the the following symptoms after today's therapy / notify MD immediately or go to the Emergency Department.    *dizziness/lightheadedness  *acute nausea/vomiting - not relieved with medication  *headache - not relieved from Tylenol/pain medication  *chest pain/pressure  *rash/itching  *shortness of breath    Drink fluids - 48-64 ounces of fluids daily.    Please call if you develop fever/chills/signs or symptoms of an infection or you are unable to drink fluids.      Provider Instructions:  Follow up visit in 6 mos for MD visit+Labs and MRI to be performed one week prior to scheduled clinic visit.  Treatment visit for prolia on the same day.  Continue with calcium and vitamin and anastrazole daily.  Labs reviewed, proceed with prolia

## 2024-08-15 NOTE — PLAN OF CARE
Problem: Chronic Conditions and Co-morbidities  Goal: Patient's chronic conditions and co-morbidity symptoms are monitored and maintained or improved  Outcome: Adequate for Discharge  Flowsheets (Taken 8/15/2024 0914)  Care Plan - Patient's Chronic Conditions and Co-Morbidity Symptoms are Monitored and Maintained or Improved:   Monitor and assess patient's chronic conditions and comorbid symptoms for stability, deterioration, or improvement   Collaborate with multidisciplinary team to address chronic and comorbid conditions and prevent exacerbation or deterioration  Note: Patient verbalizes understanding to verbal information given on prolia injection,action and possible side effects. Aware to call MD if develop complications.        Problem: Discharge Planning  Goal: Discharge to home or other facility with appropriate resources  Outcome: Adequate for Discharge  Flowsheets (Taken 8/15/2024 0914)  Discharge to home or other facility with appropriate resources:   Identify discharge learning needs (meds, wound care, etc)   Identify barriers to discharge with patient and caregiver  Note: Verbalize understanding of discharge instructions, follow up appointments, and when to call Physician.Discuss understanding of discharge instructions, follow up appointments and when to call Physician.        Problem: Safety - Adult  Goal: Free from fall injury  Outcome: Adequate for Discharge  Flowsheets (Taken 8/15/2024 0914)  Free From Fall Injury:   Based on caregiver fall risk screen, instruct family/caregiver to ask for assistance with transferring infant if caregiver noted to have fall risk factors   Instruct family/caregiver on patient safety  Note: Free from falls while in O.P. Oncology.Discussed the need to use the call light for assistance when getting up to ambulate.      Care plan reviewed with patient. Patient verbalizes understanding of the plan of care and contributes to goal setting.

## 2024-08-15 NOTE — PATIENT INSTRUCTIONS
Orders Placed This Encounter   Procedures    MRI BREAST BILATERAL W WO CONTRAST    CBC with Auto Differential    Hepatic Function Panel    POC PANEL BMP W/IOCA   Follow up visit in 6 mos for MD visit+Labs and MRI to be performed one week prior to scheduled clinic visit.  Treatment visit for prolia on the same day.  Continue with calcium and vitamin and anastrazole daily.  Labs reviewed, proceed with prolia

## 2024-08-15 NOTE — PROGRESS NOTES
Patient tolerated Prolia Injection without any complications. Discharge instructions given to patient. Verbalizes understanding. Ambulated off unit per self with belongings.

## 2024-08-15 NOTE — PROGRESS NOTES
[facility] [address], [address]  [facility] [address]  Dept: [phone]  Dept Fax: [phone] Loc: [phone]      Visit Date:8/15/2024     Sandra Forman is a 69 y.o. female who presents today for:   Chief Complaint   Patient presents with    Follow-up     Ductal carcinoma in situ (DCIS) of right breast    Results     MRI breast 7/12, breast US 7/18        HPI:   Sandra Forman is a 69 y.o. female who follows in our office with history of DCIS.  HPI per previous note in our office:  She underwent screening mammography on January 4, 2021 it showed a new cluster of calcifications in the right breast upper outer aspect middle depth.  Diagnostic mammography revealed clustered punctate calcifications in the right breast upper outer aspect middle depth.  There were no other suspicious abnormalities seen.  She underwent a stereotactic core biopsy on January 22, 2021. Pathology revealed ductal carcinoma in situ, intermediate nuclear grade, solid type with central necrosis and associated microcalcifications.  Estrogen receptors 100% positive, progesterone receptor 5% positive.  Subsequently she met with the surgeon to discuss her surgical treatment options. She decided to proceed with breast conservation therapy. The patient underwent lumpectomy on February 10, 2021.  Final pathology report showed:  A. Breast, right, lumpectomy:       Ductal carcinoma in situ, nuclear grade 3, solid and cribriform       types, with comedonecrosis and microcalcifications, pTis (DCIS) NX.    See microscopic for margin status.    Changes consistent with previous biopsy site.   B. Breast, right, new skin caudal margin, excision:    Fibrocystic changes including dense fibrosis and microcyst formation.    Fat necrosis.    Negative for malignancy.   Due to very close/positive margin the patient underwent reexcision on March 3, 2021.  Showed residual and DCIS.  New margins were negative.  Postsurgical

## 2024-08-26 NOTE — PROGRESS NOTES
detail with the nursing staff member who completed this portion of the note and I agree with the information and assessment as written. A complete review of systems was performed and found to be negative except as presented above.    PHYSICAL EXAMINATION:  VITAL SIGNS: BP (!) 144/72   Pulse 75   Temp 98.1 °F (36.7 °C) (Infrared)   Resp 16   Wt 66.5 kg (146 lb 9.6 oz)   SpO2 99%   BMI 29.59 kg/m²     ECO - Symptomatic but completely ambulatory (Restricted in physically strenuous activity but ambulatory and able to carry out work of a light or sedentary nature. For example, light housework, office work)    Physical Exam  Constitutional:       General: She is not in acute distress.     Appearance: Normal appearance.   HENT:      Head: Normocephalic and atraumatic.   Pulmonary:      Effort: Pulmonary effort is normal. No respiratory distress.   Chest:   Breasts:     Right: Inverted nipple (chronic, stable) present. No swelling, bleeding, mass, nipple discharge, skin change or tenderness.      Left: No swelling, bleeding, inverted nipple, mass, nipple discharge, skin change or tenderness.      Comments: Right: Decreased volume compared with left breast since lumpectomy  Abdominal:      General: Abdomen is flat.   Lymphadenopathy:      Upper Body:      Right upper body: No supraclavicular or axillary adenopathy.      Left upper body: No supraclavicular or axillary adenopathy.   Neurological:      Mental Status: She is alert and oriented to person, place, and time.         ATTESTATION: 23 minutes were spent with the patient at today's visit reviewing pertinent information related to their oncologic diagnosis, including any recent labs, imaging, follow ups and plan of care going forward. >50% of time spent in counseling and coordinating care.    CC:Dr. Margarita Sadler ([facility]) Dr. Yanira Monroy (Surgery)  ACC:[facility]

## 2024-08-27 ENCOUNTER — HOSPITAL ENCOUNTER (OUTPATIENT)
Dept: RADIATION ONCOLOGY | Age: 69
Discharge: HOME | End: 2024-08-27
Attending: RADIOLOGY
Payer: MEDICARE

## 2024-08-27 VITALS
HEART RATE: 75 BPM | OXYGEN SATURATION: 99 % | RESPIRATION RATE: 16 BRPM | WEIGHT: 146.6 LBS | TEMPERATURE: 98.1 F | SYSTOLIC BLOOD PRESSURE: 144 MMHG | DIASTOLIC BLOOD PRESSURE: 72 MMHG | BODY MASS INDEX: 29.59 KG/M2

## 2024-08-27 PROCEDURE — 99212 OFFICE O/P EST SF 10 MIN: CPT

## 2024-08-27 PROCEDURE — 99213 OFFICE O/P EST LOW 20 MIN: CPT

## 2024-08-27 ASSESSMENT — ENCOUNTER SYMPTOMS
BLOOD IN STOOL: 0
BACK PAIN: 0
COUGH: 0
SHORTNESS OF BREATH: 0
ABDOMINAL PAIN: 0
DIARRHEA: 0
RECTAL PAIN: 0
NAUSEA: 0
VOMITING: 0
TROUBLE SWALLOWING: 0
CONSTIPATION: 0

## 2025-01-28 ENCOUNTER — TRANSCRIBE ORDERS (OUTPATIENT)
Dept: ADMINISTRATIVE | Age: 70
End: 2025-01-28

## 2025-01-28 DIAGNOSIS — Z12.31 ENCOUNTER FOR SCREENING MAMMOGRAM FOR MALIGNANT NEOPLASM OF BREAST: Primary | ICD-10-CM

## 2025-02-10 ENCOUNTER — HOSPITAL ENCOUNTER (OUTPATIENT)
Dept: MRI IMAGING | Age: 70
Discharge: HOME | End: 2025-02-10
Attending: INTERNAL MEDICINE
Payer: MEDICARE

## 2025-02-10 ENCOUNTER — TELEPHONE (OUTPATIENT)
Dept: ONCOLOGY | Age: 70
End: 2025-02-10

## 2025-02-10 ENCOUNTER — HOSPITAL ENCOUNTER (OUTPATIENT)
Age: 70
Discharge: HOME | End: 2025-02-10
Attending: INTERNAL MEDICINE
Payer: MEDICARE

## 2025-02-10 DIAGNOSIS — M85.89 OSTEOPENIA OF MULTIPLE SITES: ICD-10-CM

## 2025-02-10 DIAGNOSIS — D05.11 DUCTAL CARCINOMA IN SITU (DCIS) OF RIGHT BREAST: ICD-10-CM

## 2025-02-10 LAB
ALBUMIN SERPL BCG-MCNC: 4.7 G/DL (ref 3.5–5.1)
ALBUMIN SERPL BCG-MCNC: 4.8 G/DL (ref 3.5–5.1)
ALP SERPL-CCNC: 50 U/L (ref 38–126)
ALP SERPL-CCNC: 51 U/L (ref 38–126)
ALT SERPL W/O P-5'-P-CCNC: 23 U/L (ref 11–66)
ALT SERPL W/O P-5'-P-CCNC: 24 U/L (ref 11–66)
ANION GAP SERPL CALC-SCNC: 21 MEQ/L (ref 8–16)
AST SERPL-CCNC: 23 U/L (ref 5–40)
AST SERPL-CCNC: 23 U/L (ref 5–40)
BASOPHILS ABSOLUTE: 0.1 THOU/MM3 (ref 0–0.1)
BASOPHILS NFR BLD AUTO: 0.8 %
BILIRUB CONJ SERPL-MCNC: < 0.1 MG/DL (ref 0–0.3)
BILIRUB SERPL-MCNC: 0.4 MG/DL (ref 0.3–1.2)
BILIRUB SERPL-MCNC: 0.4 MG/DL (ref 0.3–1.2)
BUN SERPL-MCNC: 26 MG/DL (ref 7–22)
CALCIUM SERPL-MCNC: 10.9 MG/DL (ref 8.5–10.5)
CHLORIDE SERPL-SCNC: 94 MEQ/L (ref 98–111)
CO2 SERPL-SCNC: 25 MEQ/L (ref 23–33)
CREAT SERPL-MCNC: 1.3 MG/DL (ref 0.4–1.2)
DEPRECATED RDW RBC AUTO: 42.9 FL (ref 35–45)
EOSINOPHIL NFR BLD AUTO: 0 %
EOSINOPHILS ABSOLUTE: 0 THOU/MM3 (ref 0–0.4)
ERYTHROCYTE [DISTWIDTH] IN BLOOD BY AUTOMATED COUNT: 13.3 % (ref 11.5–14.5)
GFR SERPL CREATININE-BSD FRML MDRD: 45 ML/MIN/1.73M2
GLUCOSE SERPL-MCNC: 106 MG/DL (ref 70–108)
HCT VFR BLD AUTO: 44.5 % (ref 37–47)
HGB BLD-MCNC: 14.5 GM/DL (ref 12–16)
IMM GRANULOCYTES # BLD AUTO: 0.03 THOU/MM3 (ref 0–0.07)
IMM GRANULOCYTES NFR BLD AUTO: 0.3 %
LYMPHOCYTES ABSOLUTE: 1.6 THOU/MM3 (ref 1–4.8)
LYMPHOCYTES NFR BLD AUTO: 18.6 %
MCH RBC QN AUTO: 28.8 PG (ref 26–33)
MCHC RBC AUTO-ENTMCNC: 32.6 GM/DL (ref 32.2–35.5)
MCV RBC AUTO: 88.3 FL (ref 81–99)
MONOCYTES ABSOLUTE: 0.5 THOU/MM3 (ref 0.4–1.3)
MONOCYTES NFR BLD AUTO: 5.9 %
NEUTROPHILS ABSOLUTE: 6.5 THOU/MM3 (ref 1.8–7.7)
NEUTROPHILS NFR BLD AUTO: 74.4 %
NRBC BLD AUTO-RTO: 0 /100 WBC
PHOSPHATE SERPL-MCNC: 3.2 MG/DL (ref 2.4–4.7)
PLATELET # BLD AUTO: 426 THOU/MM3 (ref 130–400)
PMV BLD AUTO: 9.6 FL (ref 9.4–12.4)
POC CREATININE WHOLE BLOOD: 1.6 MG/DL (ref 0.5–1.2)
POTASSIUM SERPL-SCNC: 4 MEQ/L (ref 3.5–5.2)
PROT SERPL-MCNC: 8 G/DL (ref 6.1–8)
PROT SERPL-MCNC: 8 G/DL (ref 6.1–8)
RBC # BLD AUTO: 5.04 MILL/MM3 (ref 4.2–5.4)
SODIUM SERPL-SCNC: 140 MEQ/L (ref 135–145)
WBC # BLD AUTO: 8.7 THOU/MM3 (ref 4.8–10.8)

## 2025-02-10 PROCEDURE — 6360000004 HC RX CONTRAST MEDICATION: Performed by: INTERNAL MEDICINE

## 2025-02-10 PROCEDURE — 82565 ASSAY OF CREATININE: CPT

## 2025-02-10 PROCEDURE — 85025 COMPLETE CBC W/AUTO DIFF WBC: CPT

## 2025-02-10 PROCEDURE — 36415 COLL VENOUS BLD VENIPUNCTURE: CPT

## 2025-02-10 PROCEDURE — A9579 GAD-BASE MR CONTRAST NOS,1ML: HCPCS | Performed by: INTERNAL MEDICINE

## 2025-02-10 PROCEDURE — C8908 MRI W/O FOL W/CONT, BREAST,: HCPCS

## 2025-02-10 PROCEDURE — 84100 ASSAY OF PHOSPHORUS: CPT

## 2025-02-10 PROCEDURE — 80053 COMPREHEN METABOLIC PANEL: CPT

## 2025-02-10 RX ADMIN — GADOTERIDOL 15 ML: 279.3 INJECTION, SOLUTION INTRAVENOUS at 16:14

## 2025-02-10 NOTE — TELEPHONE ENCOUNTER
Reviewed the creatinine obtained as lab to Cr: 1.6, higher than baseline. Called and left a voice message to call.  Advised her to improve oral hydration.

## 2025-02-14 ENCOUNTER — HOSPITAL ENCOUNTER (OUTPATIENT)
Dept: INFUSION THERAPY | Age: 70
Discharge: HOME | End: 2025-02-14
Payer: MEDICARE

## 2025-02-14 ENCOUNTER — OFFICE VISIT (OUTPATIENT)
Dept: ONCOLOGY | Age: 70
End: 2025-02-14
Payer: MEDICARE

## 2025-02-14 VITALS
HEART RATE: 101 BPM | WEIGHT: 132 LBS | BODY MASS INDEX: 26.61 KG/M2 | SYSTOLIC BLOOD PRESSURE: 139 MMHG | DIASTOLIC BLOOD PRESSURE: 69 MMHG | TEMPERATURE: 98 F | HEIGHT: 59 IN | RESPIRATION RATE: 18 BRPM | OXYGEN SATURATION: 99 %

## 2025-02-14 VITALS
RESPIRATION RATE: 18 BRPM | DIASTOLIC BLOOD PRESSURE: 69 MMHG | OXYGEN SATURATION: 99 % | SYSTOLIC BLOOD PRESSURE: 139 MMHG | TEMPERATURE: 98 F | HEART RATE: 101 BPM

## 2025-02-14 VITALS
HEIGHT: 59 IN | DIASTOLIC BLOOD PRESSURE: 69 MMHG | WEIGHT: 132 LBS | BODY MASS INDEX: 26.61 KG/M2 | OXYGEN SATURATION: 99 % | TEMPERATURE: 98 F | HEART RATE: 101 BPM | SYSTOLIC BLOOD PRESSURE: 139 MMHG | RESPIRATION RATE: 18 BRPM

## 2025-02-14 DIAGNOSIS — D05.11 DUCTAL CARCINOMA IN SITU (DCIS) OF RIGHT BREAST: Primary | ICD-10-CM

## 2025-02-14 DIAGNOSIS — D05.11 DUCTAL CARCINOMA IN SITU (DCIS) OF RIGHT BREAST: ICD-10-CM

## 2025-02-14 DIAGNOSIS — M85.89 OSTEOPENIA OF MULTIPLE SITES: ICD-10-CM

## 2025-02-14 DIAGNOSIS — Z12.31 ENCOUNTER FOR SCREENING MAMMOGRAM FOR MALIGNANT NEOPLASM OF BREAST: ICD-10-CM

## 2025-02-14 DIAGNOSIS — Z85.3 HISTORY OF RIGHT BREAST CANCER: Primary | ICD-10-CM

## 2025-02-14 LAB
BUN BLDP-MCNC: 22 MG/DL (ref 8–26)
CHLORIDE BLD-SCNC: 100 MEQ/L (ref 98–109)
CREAT BLD-MCNC: 1.1 MG/DL (ref 0.5–1.2)
GFR SERPL CREATININE-BSD FRML MDRD: 55 ML/MIN/1.73M2
GLUCOSE BLD-MCNC: 112 MG/DL (ref 70–108)
IONIZED CALCIUM, WHOLE BLOOD: 1.31 MMOL/L (ref 1.12–1.32)
POTASSIUM BLD-SCNC: 3.5 MEQ/L (ref 3.5–4.9)
SODIUM BLD-SCNC: 139 MEQ/L (ref 138–146)
TOTAL CO2, WHOLE BLOOD: 27 MEQ/L (ref 23–33)

## 2025-02-14 PROCEDURE — 99211 OFF/OP EST MAY X REQ PHY/QHP: CPT

## 2025-02-14 PROCEDURE — 3017F COLORECTAL CA SCREEN DOC REV: CPT | Performed by: INTERNAL MEDICINE

## 2025-02-14 PROCEDURE — 99214 OFFICE O/P EST MOD 30 MIN: CPT | Performed by: INTERNAL MEDICINE

## 2025-02-14 PROCEDURE — 1036F TOBACCO NON-USER: CPT | Performed by: INTERNAL MEDICINE

## 2025-02-14 PROCEDURE — 3078F DIAST BP <80 MM HG: CPT | Performed by: INTERNAL MEDICINE

## 2025-02-14 PROCEDURE — 1090F PRES/ABSN URINE INCON ASSESS: CPT | Performed by: INTERNAL MEDICINE

## 2025-02-14 PROCEDURE — 6360000002 HC RX W HCPCS: Performed by: INTERNAL MEDICINE

## 2025-02-14 PROCEDURE — 80047 BASIC METABLC PNL IONIZED CA: CPT

## 2025-02-14 PROCEDURE — 96372 THER/PROPH/DIAG INJ SC/IM: CPT

## 2025-02-14 PROCEDURE — G8417 CALC BMI ABV UP PARAM F/U: HCPCS | Performed by: INTERNAL MEDICINE

## 2025-02-14 PROCEDURE — 1126F AMNT PAIN NOTED NONE PRSNT: CPT | Performed by: INTERNAL MEDICINE

## 2025-02-14 PROCEDURE — G8427 DOCREV CUR MEDS BY ELIG CLIN: HCPCS | Performed by: INTERNAL MEDICINE

## 2025-02-14 PROCEDURE — 3075F SYST BP GE 130 - 139MM HG: CPT | Performed by: INTERNAL MEDICINE

## 2025-02-14 PROCEDURE — 1159F MED LIST DOCD IN RCRD: CPT | Performed by: INTERNAL MEDICINE

## 2025-02-14 PROCEDURE — G8399 PT W/DXA RESULTS DOCUMENT: HCPCS | Performed by: INTERNAL MEDICINE

## 2025-02-14 PROCEDURE — 1123F ACP DISCUSS/DSCN MKR DOCD: CPT | Performed by: INTERNAL MEDICINE

## 2025-02-14 RX ORDER — ALBUTEROL SULFATE 90 UG/1
4 INHALANT RESPIRATORY (INHALATION) PRN
OUTPATIENT
Start: 2025-08-11

## 2025-02-14 RX ORDER — ACETAMINOPHEN 325 MG/1
650 TABLET ORAL
OUTPATIENT
Start: 2025-08-11

## 2025-02-14 RX ORDER — DIPHENHYDRAMINE HYDROCHLORIDE 50 MG/ML
50 INJECTION INTRAMUSCULAR; INTRAVENOUS
OUTPATIENT
Start: 2025-08-11

## 2025-02-14 RX ORDER — HYDROCORTISONE SODIUM SUCCINATE 100 MG/2ML
100 INJECTION INTRAMUSCULAR; INTRAVENOUS
OUTPATIENT
Start: 2025-08-11

## 2025-02-14 RX ORDER — EPINEPHRINE 1 MG/ML
0.3 INJECTION, SOLUTION INTRAMUSCULAR; SUBCUTANEOUS PRN
OUTPATIENT
Start: 2025-08-11

## 2025-02-14 RX ORDER — ONDANSETRON 2 MG/ML
8 INJECTION INTRAMUSCULAR; INTRAVENOUS
OUTPATIENT
Start: 2025-08-11

## 2025-02-14 RX ORDER — SODIUM CHLORIDE 9 MG/ML
INJECTION, SOLUTION INTRAVENOUS CONTINUOUS
OUTPATIENT
Start: 2025-08-11

## 2025-02-14 RX ADMIN — DENOSUMAB 60 MG: 60 INJECTION SUBCUTANEOUS at 09:49

## 2025-02-14 NOTE — PLAN OF CARE
Problem: Discharge Planning  Goal: Discharge to home or other facility with appropriate resources  Outcome: Adequate for Discharge  Flowsheets (Taken 2/14/2025 1550)  Discharge to home or other facility with appropriate resources:   Arrange for needed discharge resources and transportation as appropriate   Identify barriers to discharge with patient and caregiver  Note: Verbalize understanding of discharge instructions, follow up appointments, and when to call Physician.     Problem: Safety - Adult  Goal: Free from fall injury  Flowsheets (Taken 2/14/2025 1550)  Free From Fall Injury:   Instruct family/caregiver on patient safety   Based on caregiver fall risk screen, instruct family/caregiver to ask for assistance with transferring infant if caregiver noted to have fall risk factors  Note: Free from falls while in O.P. Oncology.     Problem: Chronic Conditions and Co-morbidities  Goal: Patient's chronic conditions and co-morbidity symptoms are monitored and maintained or improved  Flowsheets (Taken 2/14/2025 1550)  Care Plan - Patient's Chronic Conditions and Co-Morbidity Symptoms are Monitored and Maintained or Improved:   Monitor and assess patient's chronic conditions and comorbid symptoms for stability, deterioration, or improvement   Collaborate with multidisciplinary team to address chronic and comorbid conditions and prevent exacerbation or deterioration  Note: Patient verbalizes understanding to verbal information given on Prolia,action and possible side effects. Aware to call MD if develop complications.

## 2025-02-14 NOTE — PROGRESS NOTES
Relation Age of Onset    High Blood Pressure Mother     Heart Disease Mother     Atrial Fibrillation Mother     Cancer Father         leukemia & Lung    Breast Cancer Daughter 39    Colon Cancer Maternal Grandmother 56      Social History     Tobacco Use    Smoking status: Never    Smokeless tobacco: Never   Substance Use Topics    Alcohol use: No      Current Outpatient Medications   Medication Sig Dispense Refill    triamterene-hydroCHLOROthiazide (DYAZIDE) 37.5-25 MG per capsule Take by mouth daily      EVENING PRIMROSE OIL PO Take by mouth in the morning and at bedtime      GLUCOSAMINE-CHONDROITIN PO Take by mouth in the morning and at bedtime      anastrozole (ARIMIDEX) 1 MG tablet TAKE 1 TABLET BY MOUTH EVERY DAY 90 tablet 0    Misc Natural Products (CALCIUM PLUS ADVANCED PO) Take by mouth      Multiple Vitamins-Minerals (VITAMIN D3 COMPLETE PO) Take 2,000 Units by mouth daily      METFORMIN HCL PO Take 500 mg by mouth 2 times daily      pravastatin (PRAVACHOL) 20 MG tablet Take 1 tablet by mouth daily      Levothyroxine Sodium 75 MCG CAPS Take 88 mcg by mouth Daily      aspirin 81 MG tablet Take 1 tablet by mouth daily       No current facility-administered medications for this visit.     Facility-Administered Medications Ordered in Other Visits   Medication Dose Route Frequency Provider Last Rate Last Admin    denosumab (PROLIA) SC injection 60 mg  60 mg SubCUTAneous Once Margarita Sadler MD          No Known Allergies       Review of Systems:   Review of Systems   Pertinent review of systems noted in HPI, all other ROS negative.   Objective:   Physical Exam   /69   Pulse (!) 101   Temp 98 °F (36.7 °C)   Resp 18   Ht 1.499 m (4' 11.02\")   Wt 59.9 kg (132 lb)   SpO2 99%   BMI 26.65 kg/m²    General appearance: No apparent distress, calm and cooperative.  Respiratory:  Normal respiratory effort. Clear to auscultation all lung fields  Breast exam: Deferred (MRI 2/11/2025)  Cardiovascular: RRR,

## 2025-02-14 NOTE — DISCHARGE INSTRUCTIONS
Patient tolerated Prolia injection without any complications. Patient verbalized understanding of discharge instructions. Ambulated off unit per self with belongings.      Orders Placed This Encounter   Procedures    ISAIAS DIGITAL SCREEN W OR WO CAD BILATERAL    Basic Metabolic Panel    CBC with Auto Differential    Hepatic Function Panel    POC PANEL BMP W/IOCA   Return in about 6 months (around 8/21/2025). MD visit+ labs+ treatment visit for prolia  MMG before the next clinic visit  BMP ordered, proceed with prolia

## 2025-02-14 NOTE — PROGRESS NOTES
Patient tolerated Prolia injection without any complications. Patient verbalized understanding of discharge instructions. Ambulated off unit per self with belongings.

## 2025-02-14 NOTE — PATIENT INSTRUCTIONS
Orders Placed This Encounter   Procedures    ISAIAS DIGITAL SCREEN W OR WO CAD BILATERAL    Basic Metabolic Panel    CBC with Auto Differential    Hepatic Function Panel    POC PANEL BMP W/IOCA   Return in about 6 months (around 8/21/2025). MD visit+ labs+ treatment visit for prolia  MMG before the next clinic visit  BMP ordered, proceed with prolia

## 2025-08-14 ENCOUNTER — HOSPITAL ENCOUNTER (OUTPATIENT)
Dept: WOMENS IMAGING | Age: 70
Discharge: HOME | End: 2025-08-14
Attending: INTERNAL MEDICINE
Payer: MEDICARE

## 2025-08-14 VITALS — BODY MASS INDEX: 25.8 KG/M2 | HEIGHT: 59 IN | WEIGHT: 128 LBS

## 2025-08-14 DIAGNOSIS — Z12.31 ENCOUNTER FOR SCREENING MAMMOGRAM FOR MALIGNANT NEOPLASM OF BREAST: ICD-10-CM

## 2025-08-14 DIAGNOSIS — D05.11 DUCTAL CARCINOMA IN SITU (DCIS) OF RIGHT BREAST: ICD-10-CM

## 2025-08-14 PROCEDURE — 77067 SCR MAMMO BI INCL CAD: CPT

## 2025-08-14 PROCEDURE — 77063 BREAST TOMOSYNTHESIS BI: CPT

## 2025-08-21 ENCOUNTER — OFFICE VISIT (OUTPATIENT)
Dept: ONCOLOGY | Age: 70
End: 2025-08-21
Payer: MEDICARE

## 2025-08-21 ENCOUNTER — HOSPITAL ENCOUNTER (OUTPATIENT)
Dept: INFUSION THERAPY | Age: 70
Discharge: HOME | End: 2025-08-21
Payer: MEDICARE

## 2025-08-21 VITALS
HEIGHT: 59 IN | RESPIRATION RATE: 16 BRPM | SYSTOLIC BLOOD PRESSURE: 136 MMHG | DIASTOLIC BLOOD PRESSURE: 68 MMHG | BODY MASS INDEX: 26.57 KG/M2 | HEART RATE: 77 BPM | TEMPERATURE: 98.3 F | WEIGHT: 131.8 LBS | OXYGEN SATURATION: 100 %

## 2025-08-21 VITALS
SYSTOLIC BLOOD PRESSURE: 136 MMHG | OXYGEN SATURATION: 100 % | TEMPERATURE: 98.3 F | RESPIRATION RATE: 16 BRPM | HEART RATE: 77 BPM | DIASTOLIC BLOOD PRESSURE: 68 MMHG

## 2025-08-21 VITALS
SYSTOLIC BLOOD PRESSURE: 129 MMHG | HEART RATE: 69 BPM | TEMPERATURE: 97.9 F | DIASTOLIC BLOOD PRESSURE: 65 MMHG | OXYGEN SATURATION: 99 % | RESPIRATION RATE: 16 BRPM

## 2025-08-21 DIAGNOSIS — D05.11 DUCTAL CARCINOMA IN SITU (DCIS) OF RIGHT BREAST: ICD-10-CM

## 2025-08-21 DIAGNOSIS — M85.89 OSTEOPENIA OF MULTIPLE SITES: Primary | ICD-10-CM

## 2025-08-21 DIAGNOSIS — N17.1 ACUTE RENAL FAILURE WITH ACUTE CORTICAL NECROSIS: ICD-10-CM

## 2025-08-21 DIAGNOSIS — M85.89 OSTEOPENIA OF MULTIPLE SITES: ICD-10-CM

## 2025-08-21 DIAGNOSIS — N17.1 ACUTE RENAL FAILURE WITH ACUTE CORTICAL NECROSIS: Primary | ICD-10-CM

## 2025-08-21 LAB
ALBUMIN SERPL BCG-MCNC: 4.4 G/DL (ref 3.4–4.9)
ALP SERPL-CCNC: 49 U/L (ref 38–126)
ALT SERPL W/O P-5'-P-CCNC: 25 U/L (ref 10–35)
AST SERPL-CCNC: 25 U/L (ref 10–35)
BASOPHILS ABSOLUTE: 0.1 THOU/MM3 (ref 0–0.1)
BASOPHILS NFR BLD AUTO: 1 % (ref 0–3)
BILIRUB CONJ SERPL-MCNC: 0.2 MG/DL (ref 0–0.2)
BILIRUB SERPL-MCNC: 0.4 MG/DL (ref 0.3–1.2)
BUN BLDP-MCNC: 25 MG/DL (ref 8–26)
CHLORIDE BLD-SCNC: 99 MEQ/L (ref 98–109)
CREAT BLD-MCNC: 1.7 MG/DL (ref 0.5–1.2)
EOSINOPHIL NFR BLD AUTO: 0 % (ref 0–4)
EOSINOPHILS ABSOLUTE: 0 THOU/MM3 (ref 0–0.4)
ERYTHROCYTE [DISTWIDTH] IN BLOOD BY AUTOMATED COUNT: 13.1 % (ref 11.5–14.5)
GFR SERPL CREATININE-BSD FRML MDRD: 32 ML/MIN/1.73M2
GLUCOSE BLD-MCNC: 100 MG/DL (ref 70–108)
HCT VFR BLD AUTO: 41.6 % (ref 37–47)
HGB BLD-MCNC: 13.5 GM/DL (ref 12–16)
IMMATURE GRANULOCYTES %: 0 %
IMMATURE GRANULOCYTES ABSOLUTE: 0.01 THOU/MM3 (ref 0–0.07)
IONIZED CALCIUM, WHOLE BLOOD: 1.2 MMOL/L (ref 1.12–1.32)
LYMPHOCYTES ABSOLUTE: 2.3 THOU/MM3 (ref 1–4.8)
LYMPHOCYTES NFR BLD AUTO: 32 % (ref 15–47)
MCH RBC QN AUTO: 28.6 PG (ref 26–33)
MCHC RBC AUTO-ENTMCNC: 32.5 GM/DL (ref 32.2–35.5)
MCV RBC AUTO: 88 FL (ref 81–99)
MONOCYTES ABSOLUTE: 0.6 THOU/MM3 (ref 0.4–1.3)
MONOCYTES NFR BLD AUTO: 8 % (ref 0–12)
NEUTROPHILS ABSOLUTE: 4.3 THOU/MM3 (ref 1.8–7.7)
NEUTROPHILS NFR BLD AUTO: 60 % (ref 43–75)
PLATELET # BLD AUTO: 358 THOU/MM3 (ref 130–400)
PMV BLD AUTO: 9.1 FL (ref 9.4–12.4)
POTASSIUM BLD-SCNC: 3.9 MEQ/L (ref 3.5–4.9)
PROT SERPL-MCNC: 7.2 G/DL (ref 6.4–8.3)
RBC # BLD AUTO: 4.72 MILL/MM3 (ref 4.2–5.4)
SODIUM BLD-SCNC: 141 MEQ/L (ref 138–146)
TOTAL CO2, WHOLE BLOOD: 32 MEQ/L (ref 23–33)
WBC # BLD AUTO: 7.2 THOU/MM3 (ref 4.8–10.8)

## 2025-08-21 PROCEDURE — 1036F TOBACCO NON-USER: CPT | Performed by: INTERNAL MEDICINE

## 2025-08-21 PROCEDURE — 1160F RVW MEDS BY RX/DR IN RCRD: CPT | Performed by: INTERNAL MEDICINE

## 2025-08-21 PROCEDURE — G8417 CALC BMI ABV UP PARAM F/U: HCPCS | Performed by: INTERNAL MEDICINE

## 2025-08-21 PROCEDURE — 2580000003 HC RX 258: Performed by: INTERNAL MEDICINE

## 2025-08-21 PROCEDURE — 1159F MED LIST DOCD IN RCRD: CPT | Performed by: INTERNAL MEDICINE

## 2025-08-21 PROCEDURE — 36415 COLL VENOUS BLD VENIPUNCTURE: CPT

## 2025-08-21 PROCEDURE — G8427 DOCREV CUR MEDS BY ELIG CLIN: HCPCS | Performed by: INTERNAL MEDICINE

## 2025-08-21 PROCEDURE — 96360 HYDRATION IV INFUSION INIT: CPT

## 2025-08-21 PROCEDURE — 6360000002 HC RX W HCPCS: Performed by: INTERNAL MEDICINE

## 2025-08-21 PROCEDURE — 96372 THER/PROPH/DIAG INJ SC/IM: CPT

## 2025-08-21 PROCEDURE — 3075F SYST BP GE 130 - 139MM HG: CPT | Performed by: INTERNAL MEDICINE

## 2025-08-21 PROCEDURE — 80076 HEPATIC FUNCTION PANEL: CPT

## 2025-08-21 PROCEDURE — 1123F ACP DISCUSS/DSCN MKR DOCD: CPT | Performed by: INTERNAL MEDICINE

## 2025-08-21 PROCEDURE — 1126F AMNT PAIN NOTED NONE PRSNT: CPT | Performed by: INTERNAL MEDICINE

## 2025-08-21 PROCEDURE — 99211 OFF/OP EST MAY X REQ PHY/QHP: CPT

## 2025-08-21 PROCEDURE — 85025 COMPLETE CBC W/AUTO DIFF WBC: CPT

## 2025-08-21 PROCEDURE — G8399 PT W/DXA RESULTS DOCUMENT: HCPCS | Performed by: INTERNAL MEDICINE

## 2025-08-21 PROCEDURE — 3078F DIAST BP <80 MM HG: CPT | Performed by: INTERNAL MEDICINE

## 2025-08-21 PROCEDURE — 1090F PRES/ABSN URINE INCON ASSESS: CPT | Performed by: INTERNAL MEDICINE

## 2025-08-21 PROCEDURE — 3017F COLORECTAL CA SCREEN DOC REV: CPT | Performed by: INTERNAL MEDICINE

## 2025-08-21 PROCEDURE — 80047 BASIC METABLC PNL IONIZED CA: CPT

## 2025-08-21 PROCEDURE — 99214 OFFICE O/P EST MOD 30 MIN: CPT | Performed by: INTERNAL MEDICINE

## 2025-08-21 RX ORDER — EPINEPHRINE 1 MG/ML
0.3 INJECTION, SOLUTION, CONCENTRATE INTRAVENOUS PRN
Status: CANCELLED | OUTPATIENT
Start: 2025-08-21

## 2025-08-21 RX ORDER — TIRZEPATIDE 2.5 MG/.5ML
INJECTION, SOLUTION SUBCUTANEOUS
COMMUNITY

## 2025-08-21 RX ORDER — EPINEPHRINE 1 MG/ML
0.3 INJECTION, SOLUTION INTRAMUSCULAR; SUBCUTANEOUS PRN
OUTPATIENT
Start: 2026-02-08

## 2025-08-21 RX ORDER — DIPHENHYDRAMINE HYDROCHLORIDE 50 MG/ML
50 INJECTION, SOLUTION INTRAMUSCULAR; INTRAVENOUS
Status: CANCELLED | OUTPATIENT
Start: 2025-08-21

## 2025-08-21 RX ORDER — 0.9 % SODIUM CHLORIDE 0.9 %
1000 INTRAVENOUS SOLUTION INTRAVENOUS ONCE
Status: COMPLETED | OUTPATIENT
Start: 2025-08-21 | End: 2025-08-21

## 2025-08-21 RX ORDER — ALBUTEROL SULFATE 90 UG/1
4 INHALANT RESPIRATORY (INHALATION) PRN
OUTPATIENT
Start: 2026-02-08

## 2025-08-21 RX ORDER — HYDROCORTISONE SODIUM SUCCINATE 100 MG/2ML
100 INJECTION INTRAMUSCULAR; INTRAVENOUS
OUTPATIENT
Start: 2025-08-21

## 2025-08-21 RX ORDER — HYDROCORTISONE SODIUM SUCCINATE 100 MG/2ML
100 INJECTION INTRAMUSCULAR; INTRAVENOUS
Status: CANCELLED | OUTPATIENT
Start: 2025-08-21

## 2025-08-21 RX ORDER — ONDANSETRON 2 MG/ML
8 INJECTION INTRAMUSCULAR; INTRAVENOUS
Status: CANCELLED | OUTPATIENT
Start: 2025-08-21

## 2025-08-21 RX ORDER — ACETAMINOPHEN 325 MG/1
650 TABLET ORAL
Status: CANCELLED | OUTPATIENT
Start: 2025-08-21

## 2025-08-21 RX ORDER — SODIUM CHLORIDE 9 MG/ML
INJECTION, SOLUTION INTRAVENOUS CONTINUOUS
OUTPATIENT
Start: 2026-02-08

## 2025-08-21 RX ORDER — SODIUM CHLORIDE 0.9 % (FLUSH) 0.9 %
5-40 SYRINGE (ML) INJECTION PRN
Status: CANCELLED | OUTPATIENT
Start: 2025-08-21

## 2025-08-21 RX ORDER — EPINEPHRINE 1 MG/ML
0.3 INJECTION, SOLUTION INTRAMUSCULAR; SUBCUTANEOUS PRN
OUTPATIENT
Start: 2025-08-21

## 2025-08-21 RX ORDER — 0.9 % SODIUM CHLORIDE 0.9 %
1000 INTRAVENOUS SOLUTION INTRAVENOUS ONCE
Status: CANCELLED | OUTPATIENT
Start: 2025-08-21 | End: 2025-08-21

## 2025-08-21 RX ORDER — SODIUM CHLORIDE 9 MG/ML
INJECTION, SOLUTION INTRAVENOUS PRN
OUTPATIENT
Start: 2025-08-21

## 2025-08-21 RX ORDER — HEPARIN 100 UNIT/ML
500 SYRINGE INTRAVENOUS PRN
OUTPATIENT
Start: 2025-08-21

## 2025-08-21 RX ORDER — SODIUM CHLORIDE 0.9 % (FLUSH) 0.9 %
5-40 SYRINGE (ML) INJECTION PRN
Status: DISCONTINUED | OUTPATIENT
Start: 2025-08-21 | End: 2025-08-22 | Stop reason: HOSPADM

## 2025-08-21 RX ORDER — ALBUTEROL SULFATE 90 UG/1
4 INHALANT RESPIRATORY (INHALATION) PRN
Status: CANCELLED | OUTPATIENT
Start: 2025-08-21

## 2025-08-21 RX ORDER — HEPARIN SODIUM (PORCINE) LOCK FLUSH IV SOLN 100 UNIT/ML 100 UNIT/ML
500 SOLUTION INTRAVENOUS PRN
Status: CANCELLED | OUTPATIENT
Start: 2025-08-21

## 2025-08-21 RX ORDER — DIPHENHYDRAMINE HYDROCHLORIDE 50 MG/ML
50 INJECTION, SOLUTION INTRAMUSCULAR; INTRAVENOUS
OUTPATIENT
Start: 2025-08-21

## 2025-08-21 RX ORDER — DIPHENHYDRAMINE HYDROCHLORIDE 50 MG/ML
50 INJECTION, SOLUTION INTRAMUSCULAR; INTRAVENOUS
OUTPATIENT
Start: 2026-02-08

## 2025-08-21 RX ORDER — ALBUTEROL SULFATE 90 UG/1
4 INHALANT RESPIRATORY (INHALATION) PRN
OUTPATIENT
Start: 2025-08-21

## 2025-08-21 RX ORDER — SODIUM CHLORIDE 9 MG/ML
5-250 INJECTION, SOLUTION INTRAVENOUS PRN
OUTPATIENT
Start: 2025-08-21

## 2025-08-21 RX ORDER — FAMOTIDINE 10 MG/ML
20 INJECTION, SOLUTION INTRAVENOUS
Status: CANCELLED | OUTPATIENT
Start: 2025-08-21

## 2025-08-21 RX ORDER — HYDROCORTISONE SODIUM SUCCINATE 100 MG/2ML
100 INJECTION INTRAMUSCULAR; INTRAVENOUS
OUTPATIENT
Start: 2026-02-08

## 2025-08-21 RX ORDER — ACETAMINOPHEN 325 MG/1
650 TABLET ORAL
OUTPATIENT
Start: 2025-08-21

## 2025-08-21 RX ORDER — SODIUM CHLORIDE 9 MG/ML
INJECTION, SOLUTION INTRAVENOUS PRN
Status: CANCELLED | OUTPATIENT
Start: 2025-08-21

## 2025-08-21 RX ORDER — SODIUM CHLORIDE 9 MG/ML
5-250 INJECTION, SOLUTION INTRAVENOUS PRN
Status: CANCELLED | OUTPATIENT
Start: 2025-08-21

## 2025-08-21 RX ORDER — ONDANSETRON 2 MG/ML
8 INJECTION INTRAMUSCULAR; INTRAVENOUS
OUTPATIENT
Start: 2025-08-21

## 2025-08-21 RX ORDER — SODIUM CHLORIDE 9 MG/ML
INJECTION, SOLUTION INTRAVENOUS CONTINUOUS
Status: CANCELLED | OUTPATIENT
Start: 2025-08-21

## 2025-08-21 RX ORDER — ONDANSETRON 2 MG/ML
8 INJECTION INTRAMUSCULAR; INTRAVENOUS
OUTPATIENT
Start: 2026-02-08

## 2025-08-21 RX ORDER — ACETAMINOPHEN 325 MG/1
650 TABLET ORAL
OUTPATIENT
Start: 2026-02-08

## 2025-08-21 RX ORDER — SODIUM CHLORIDE 0.9 % (FLUSH) 0.9 %
5-40 SYRINGE (ML) INJECTION PRN
OUTPATIENT
Start: 2025-08-21

## 2025-08-21 RX ADMIN — DENOSUMAB 60 MG: 60 INJECTION SUBCUTANEOUS at 14:23

## 2025-08-21 RX ADMIN — SODIUM CHLORIDE 1000 ML: 0.9 INJECTION, SOLUTION INTRAVENOUS at 14:12

## (undated) DEVICE — Z INACTIVE SYRINGE BLB IRR

## (undated) DEVICE — SPONGE LAP W18XL18IN WHT COT 4 PLY FLD STRUNG RADPQ DISP ST

## (undated) DEVICE — SOLUTION IV 1000ML 0.9% SOD CHL PH 5 INJ USP VIAFLX PLAS

## (undated) DEVICE — ADHESIVE SKIN CLSR 0.7ML TOP DERMBND ADV

## (undated) DEVICE — SUTURE VCRL + SZ 3-0 L27IN ABSRB UD L26MM SH 1/2 CIR VCP416H

## (undated) DEVICE — COVER US PRB W5XL96IN LTX W/ GEL

## (undated) DEVICE — SYRINGE IRRIG 60ML SFT PLIABLE BLB EZ TO GRP 1 HND USE W/

## (undated) DEVICE — SHEET, T, LAPAROTOMY, STERILE: Brand: MEDLINE

## (undated) DEVICE — APPLIER CLP L9.375IN APER 2.1MM CLS L3.8MM 20 SM TI CLP

## (undated) DEVICE — SPECIMEN ORIENTATION CHARMS, SIX DISTINCTLY SHAPED STERILE 10MM CHARMS: Brand: MARGINMAP

## (undated) DEVICE — BREAST HERNIA PACK: Brand: MEDLINE INDUSTRIES, INC.

## (undated) DEVICE — TUBING, SUCTION, 1/4" X 12', STRAIGHT: Brand: MEDLINE

## (undated) DEVICE — YANKAUER,BULB TIP,W/O VENT,RIGID,STERILE: Brand: MEDLINE

## (undated) DEVICE — PACK PROCEDURE SURG PLAS SC MIN SRHP LF

## (undated) DEVICE — APPLICATOR MEDICATED 26 CC SOLUTION HI LT ORNG CHLORAPREP

## (undated) DEVICE — GLOVE SURG SZ 7 L12IN FNGR THK94MIL TRNSLUC YEL LTX HYDRGEL

## (undated) DEVICE — SUTURE VCRL SZ 2-0 L27IN ABSRB UD L26MM SH 1/2 CIR J417H

## (undated) DEVICE — SUTURE MCRYL SZ 4-0 L27IN ABSRB UD L19MM PS-2 1/2 CIR PRIM Y426H